# Patient Record
Sex: MALE | Race: WHITE | NOT HISPANIC OR LATINO
[De-identification: names, ages, dates, MRNs, and addresses within clinical notes are randomized per-mention and may not be internally consistent; named-entity substitution may affect disease eponyms.]

---

## 2017-03-10 ENCOUNTER — NON-APPOINTMENT (OUTPATIENT)
Age: 82
End: 2017-03-10

## 2017-03-10 ENCOUNTER — APPOINTMENT (OUTPATIENT)
Dept: CARDIOLOGY | Facility: CLINIC | Age: 82
End: 2017-03-10

## 2017-03-10 VITALS
DIASTOLIC BLOOD PRESSURE: 73 MMHG | BODY MASS INDEX: 23.03 KG/M2 | SYSTOLIC BLOOD PRESSURE: 137 MMHG | HEART RATE: 60 BPM | HEIGHT: 72 IN | WEIGHT: 170 LBS | OXYGEN SATURATION: 98 %

## 2017-05-02 ENCOUNTER — MEDICATION RENEWAL (OUTPATIENT)
Age: 82
End: 2017-05-02

## 2017-06-16 ENCOUNTER — APPOINTMENT (OUTPATIENT)
Dept: DERMATOLOGY | Facility: CLINIC | Age: 82
End: 2017-06-16

## 2017-09-27 ENCOUNTER — MEDICATION RENEWAL (OUTPATIENT)
Age: 82
End: 2017-09-27

## 2017-09-29 ENCOUNTER — APPOINTMENT (OUTPATIENT)
Dept: CARDIOLOGY | Facility: CLINIC | Age: 82
End: 2017-09-29
Payer: MEDICARE

## 2017-09-29 ENCOUNTER — NON-APPOINTMENT (OUTPATIENT)
Age: 82
End: 2017-09-29

## 2017-09-29 VITALS
OXYGEN SATURATION: 98 % | HEART RATE: 46 BPM | DIASTOLIC BLOOD PRESSURE: 65 MMHG | BODY MASS INDEX: 22.38 KG/M2 | WEIGHT: 165 LBS | SYSTOLIC BLOOD PRESSURE: 144 MMHG

## 2017-09-29 PROCEDURE — 99214 OFFICE O/P EST MOD 30 MIN: CPT

## 2017-09-29 PROCEDURE — 93000 ELECTROCARDIOGRAM COMPLETE: CPT

## 2017-10-09 ENCOUNTER — APPOINTMENT (OUTPATIENT)
Dept: CARDIOLOGY | Facility: CLINIC | Age: 82
End: 2017-10-09
Payer: MEDICARE

## 2017-10-09 PROCEDURE — 93306 TTE W/DOPPLER COMPLETE: CPT

## 2017-11-24 ENCOUNTER — MEDICATION RENEWAL (OUTPATIENT)
Age: 82
End: 2017-11-24

## 2018-02-09 ENCOUNTER — NON-APPOINTMENT (OUTPATIENT)
Age: 83
End: 2018-02-09

## 2018-02-09 ENCOUNTER — APPOINTMENT (OUTPATIENT)
Dept: CARDIOLOGY | Facility: CLINIC | Age: 83
End: 2018-02-09
Payer: MEDICARE

## 2018-02-09 VITALS
OXYGEN SATURATION: 100 % | SYSTOLIC BLOOD PRESSURE: 190 MMHG | HEIGHT: 72 IN | DIASTOLIC BLOOD PRESSURE: 76 MMHG | BODY MASS INDEX: 22.75 KG/M2 | HEART RATE: 69 BPM | WEIGHT: 168 LBS

## 2018-02-09 VITALS — SYSTOLIC BLOOD PRESSURE: 190 MMHG | DIASTOLIC BLOOD PRESSURE: 78 MMHG

## 2018-02-09 VITALS — SYSTOLIC BLOOD PRESSURE: 130 MMHG | DIASTOLIC BLOOD PRESSURE: 70 MMHG

## 2018-02-09 PROCEDURE — 93000 ELECTROCARDIOGRAM COMPLETE: CPT

## 2018-02-09 PROCEDURE — 99214 OFFICE O/P EST MOD 30 MIN: CPT

## 2018-02-09 RX ORDER — HYDROCORTISONE 25 MG/G
2.5 CREAM TOPICAL
Qty: 28 | Refills: 0 | Status: ACTIVE | COMMUNITY
Start: 2017-09-05

## 2018-02-09 RX ORDER — METOPROLOL TARTRATE 50 MG/1
50 TABLET, FILM COATED ORAL
Qty: 180 | Refills: 0 | Status: DISCONTINUED | COMMUNITY
Start: 2017-07-15

## 2018-05-11 ENCOUNTER — NON-APPOINTMENT (OUTPATIENT)
Age: 83
End: 2018-05-11

## 2018-05-11 ENCOUNTER — APPOINTMENT (OUTPATIENT)
Dept: CARDIOLOGY | Facility: CLINIC | Age: 83
End: 2018-05-11
Payer: MEDICARE

## 2018-05-11 VITALS — SYSTOLIC BLOOD PRESSURE: 160 MMHG | DIASTOLIC BLOOD PRESSURE: 70 MMHG

## 2018-05-11 VITALS
DIASTOLIC BLOOD PRESSURE: 68 MMHG | SYSTOLIC BLOOD PRESSURE: 159 MMHG | WEIGHT: 161 LBS | OXYGEN SATURATION: 99 % | HEIGHT: 72 IN | HEART RATE: 54 BPM | BODY MASS INDEX: 21.81 KG/M2

## 2018-05-11 DIAGNOSIS — R00.1 BRADYCARDIA, UNSPECIFIED: ICD-10-CM

## 2018-05-11 PROCEDURE — 99214 OFFICE O/P EST MOD 30 MIN: CPT

## 2018-05-11 PROCEDURE — 93000 ELECTROCARDIOGRAM COMPLETE: CPT

## 2018-05-14 LAB
ALBUMIN SERPL ELPH-MCNC: 4.3 G/DL
ALP BLD-CCNC: 71 U/L
ALT SERPL-CCNC: 14 U/L
ANION GAP SERPL CALC-SCNC: 14 MMOL/L
AST SERPL-CCNC: 23 U/L
BASOPHILS # BLD AUTO: 0.02 K/UL
BASOPHILS NFR BLD AUTO: 0.3 %
BILIRUB SERPL-MCNC: 0.9 MG/DL
BUN SERPL-MCNC: 22 MG/DL
CALCIUM SERPL-MCNC: 9.7 MG/DL
CHLORIDE SERPL-SCNC: 104 MMOL/L
CO2 SERPL-SCNC: 24 MMOL/L
CREAT SERPL-MCNC: 1.32 MG/DL
EOSINOPHIL # BLD AUTO: 0.12 K/UL
EOSINOPHIL NFR BLD AUTO: 1.6 %
GLUCOSE SERPL-MCNC: 178 MG/DL
HCT VFR BLD CALC: 42.5 %
HGB BLD-MCNC: 14.3 G/DL
IMM GRANULOCYTES NFR BLD AUTO: 0.1 %
LYMPHOCYTES # BLD AUTO: 2.95 K/UL
LYMPHOCYTES NFR BLD AUTO: 39.3 %
MAN DIFF?: NORMAL
MCHC RBC-ENTMCNC: 30.2 PG
MCHC RBC-ENTMCNC: 33.6 GM/DL
MCV RBC AUTO: 89.9 FL
MONOCYTES # BLD AUTO: 0.64 K/UL
MONOCYTES NFR BLD AUTO: 8.5 %
NEUTROPHILS # BLD AUTO: 3.76 K/UL
NEUTROPHILS NFR BLD AUTO: 50.2 %
PLATELET # BLD AUTO: 138 K/UL
POTASSIUM SERPL-SCNC: 4.1 MMOL/L
PROT SERPL-MCNC: 6.8 G/DL
RBC # BLD: 4.73 M/UL
RBC # FLD: 13.4 %
SODIUM SERPL-SCNC: 142 MMOL/L
TSH SERPL-ACNC: 2.91 UIU/ML
WBC # FLD AUTO: 7.5 K/UL

## 2018-05-21 VITALS
OXYGEN SATURATION: 100 % | HEART RATE: 62 BPM | SYSTOLIC BLOOD PRESSURE: 180 MMHG | DIASTOLIC BLOOD PRESSURE: 70 MMHG | RESPIRATION RATE: 18 BRPM

## 2018-05-31 ENCOUNTER — APPOINTMENT (OUTPATIENT)
Dept: CARDIOLOGY | Facility: CLINIC | Age: 83
End: 2018-05-31
Payer: MEDICARE

## 2018-05-31 ENCOUNTER — NON-APPOINTMENT (OUTPATIENT)
Age: 83
End: 2018-05-31

## 2018-05-31 VITALS
WEIGHT: 166 LBS | BODY MASS INDEX: 22.48 KG/M2 | HEART RATE: 101 BPM | SYSTOLIC BLOOD PRESSURE: 112 MMHG | OXYGEN SATURATION: 99 % | DIASTOLIC BLOOD PRESSURE: 65 MMHG | HEIGHT: 72 IN

## 2018-05-31 PROCEDURE — 99214 OFFICE O/P EST MOD 30 MIN: CPT

## 2018-05-31 PROCEDURE — 93000 ELECTROCARDIOGRAM COMPLETE: CPT

## 2018-06-15 ENCOUNTER — NON-APPOINTMENT (OUTPATIENT)
Age: 83
End: 2018-06-15

## 2018-06-15 ENCOUNTER — APPOINTMENT (OUTPATIENT)
Dept: CARDIOLOGY | Facility: CLINIC | Age: 83
End: 2018-06-15
Payer: MEDICARE

## 2018-06-15 VITALS — DIASTOLIC BLOOD PRESSURE: 64 MMHG | SYSTOLIC BLOOD PRESSURE: 140 MMHG

## 2018-06-15 VITALS
HEART RATE: 62 BPM | DIASTOLIC BLOOD PRESSURE: 63 MMHG | HEIGHT: 72 IN | SYSTOLIC BLOOD PRESSURE: 164 MMHG | OXYGEN SATURATION: 98 %

## 2018-06-15 DIAGNOSIS — R60.0 LOCALIZED EDEMA: ICD-10-CM

## 2018-06-15 DIAGNOSIS — E78.5 HYPERLIPIDEMIA, UNSPECIFIED: ICD-10-CM

## 2018-06-15 DIAGNOSIS — R73.09 OTHER ABNORMAL GLUCOSE: ICD-10-CM

## 2018-06-15 DIAGNOSIS — I25.10 ATHEROSCLEROTIC HEART DISEASE OF NATIVE CORONARY ARTERY W/OUT ANGINA PECTORIS: ICD-10-CM

## 2018-06-15 PROCEDURE — 93000 ELECTROCARDIOGRAM COMPLETE: CPT

## 2018-06-15 PROCEDURE — 99214 OFFICE O/P EST MOD 30 MIN: CPT

## 2018-06-21 ENCOUNTER — RESULT REVIEW (OUTPATIENT)
Age: 83
End: 2018-06-21

## 2018-06-26 VITALS
TEMPERATURE: 98 F | SYSTOLIC BLOOD PRESSURE: 152 MMHG | DIASTOLIC BLOOD PRESSURE: 64 MMHG | RESPIRATION RATE: 18 BRPM | OXYGEN SATURATION: 96 % | HEART RATE: 60 BPM

## 2018-07-27 ENCOUNTER — RX RENEWAL (OUTPATIENT)
Age: 83
End: 2018-07-27

## 2018-07-27 ENCOUNTER — MEDICATION RENEWAL (OUTPATIENT)
Age: 83
End: 2018-07-27

## 2018-07-27 VITALS — HEART RATE: 53 BPM | SYSTOLIC BLOOD PRESSURE: 158 MMHG | DIASTOLIC BLOOD PRESSURE: 68 MMHG

## 2018-09-03 PROBLEM — R60.0 BILATERAL EDEMA OF LOWER EXTREMITY: Status: ACTIVE | Noted: 2018-06-15

## 2018-09-28 VITALS — DIASTOLIC BLOOD PRESSURE: 68 MMHG | SYSTOLIC BLOOD PRESSURE: 159 MMHG

## 2018-09-28 VITALS — SYSTOLIC BLOOD PRESSURE: 162 MMHG | OXYGEN SATURATION: 99 % | HEART RATE: 55 BPM | DIASTOLIC BLOOD PRESSURE: 64 MMHG

## 2018-10-14 ENCOUNTER — RX RENEWAL (OUTPATIENT)
Age: 83
End: 2018-10-14

## 2018-11-16 VITALS — DIASTOLIC BLOOD PRESSURE: 60 MMHG | SYSTOLIC BLOOD PRESSURE: 140 MMHG

## 2018-11-16 VITALS
SYSTOLIC BLOOD PRESSURE: 152 MMHG | HEART RATE: 72 BPM | BODY MASS INDEX: 21.94 KG/M2 | WEIGHT: 162 LBS | RESPIRATION RATE: 16 BRPM | HEIGHT: 72 IN | OXYGEN SATURATION: 98 % | DIASTOLIC BLOOD PRESSURE: 64 MMHG

## 2019-02-06 ENCOUNTER — RX RENEWAL (OUTPATIENT)
Age: 84
End: 2019-02-06

## 2019-03-15 ENCOUNTER — APPOINTMENT (OUTPATIENT)
Dept: CARDIOLOGY | Facility: CLINIC | Age: 84
End: 2019-03-15
Payer: MEDICARE

## 2019-03-15 ENCOUNTER — NON-APPOINTMENT (OUTPATIENT)
Age: 84
End: 2019-03-15

## 2019-03-15 VITALS
OXYGEN SATURATION: 100 % | WEIGHT: 166 LBS | HEART RATE: 64 BPM | HEIGHT: 72 IN | BODY MASS INDEX: 22.48 KG/M2 | DIASTOLIC BLOOD PRESSURE: 62 MMHG | SYSTOLIC BLOOD PRESSURE: 162 MMHG

## 2019-03-15 PROCEDURE — 93000 ELECTROCARDIOGRAM COMPLETE: CPT

## 2019-03-15 PROCEDURE — 99214 OFFICE O/P EST MOD 30 MIN: CPT

## 2019-03-15 NOTE — REVIEW OF SYSTEMS
[Eyeglasses] : currently wearing eyeglasses [Loss Of Hearing] : hearing loss [see HPI] : see HPI [Lower Ext Edema] : lower extremity edema [Negative] : Heme/Lymph

## 2019-04-06 NOTE — PHYSICAL EXAM
[General Appearance - Well Developed] : well developed [Normal Appearance] : normal appearance [Well Groomed] : well groomed [General Appearance - Well Nourished] : well nourished [General Appearance - In No Acute Distress] : no acute distress [Normal Conjunctiva] : the conjunctiva exhibited no abnormalities [No Oral Pallor] : no oral pallor [No Oral Cyanosis] : no oral cyanosis [Normal Jugular Venous V Waves Present] : normal jugular venous V waves present [No Jugular Venous Cabezas A Waves] : no jugular venous cabezas A waves [Respiration, Rhythm And Depth] : normal respiratory rhythm and effort [Exaggerated Use Of Accessory Muscles For Inspiration] : no accessory muscle use [Auscultation Breath Sounds / Voice Sounds] : lungs were clear to auscultation bilaterally [Heart Rate And Rhythm] : heart rate and rhythm were normal [Heart Sounds] : normal S1 and S2 [Arterial Pulses Normal] : the arterial pulses were normal [Diastolic Grade ___/4] : A grade [unfilled]/4 diastolic murmur was heard. [Abdomen Soft] : soft [Abdomen Tenderness] : non-tender [Abnormal Walk] : normal gait [Nail Clubbing] : no clubbing of the fingernails [Cyanosis, Localized] : no localized cyanosis [Skin Turgor] : normal skin turgor [] : no rash [Oriented To Time, Place, And Person] : oriented to person, place, and time [Impaired Insight] : insight and judgment were intact [Affect] : the affect was normal [Memory Recent] : recent memory was not impaired [FreeTextEntry1] : 2+ bilateral ankle edema

## 2019-04-06 NOTE — HISTORY OF PRESENT ILLNESS
[FreeTextEntry1] : 83 year old man with a history of hypertension, hyperlipidemia, paroxysmal atrial fibrillation - on Eliquis, dilated aortic root - stable (4.0 cm) on 10/20/17 TTE and back pain who is here for f/u. Reports of having low BP readings since an increase of Amlodipine from 5 mg to 10 mg QD. He has been feeling tired and under stress taking care of his wife, who needs 100% care and was diagnosed with leukemia.  She is now on hospice.  He.denies chest pain, shortness of breath, palpitations, syncope or near syncope, orthopnea and PND. On 5 mg of Norvasc, the edema is improved but still wears compression stockings..  He denies chest pain, dyspnea, palpitations or near syncope but notes lightheadedness with position changes.

## 2019-04-24 ENCOUNTER — RX RENEWAL (OUTPATIENT)
Age: 84
End: 2019-04-24

## 2019-05-15 ENCOUNTER — RX RENEWAL (OUTPATIENT)
Age: 84
End: 2019-05-15

## 2019-07-09 ENCOUNTER — RX RENEWAL (OUTPATIENT)
Age: 84
End: 2019-07-09

## 2019-07-10 RX ORDER — METOPROLOL TARTRATE 50 MG/1
50 TABLET, FILM COATED ORAL TWICE DAILY
Qty: 180 | Refills: 3 | Status: DISCONTINUED | COMMUNITY
Start: 2019-05-15 | End: 2019-07-10

## 2019-07-31 ENCOUNTER — RX RENEWAL (OUTPATIENT)
Age: 84
End: 2019-07-31

## 2019-08-29 ENCOUNTER — APPOINTMENT (OUTPATIENT)
Dept: CARDIOLOGY | Facility: CLINIC | Age: 84
End: 2019-08-29
Payer: MEDICARE

## 2019-08-29 VITALS
DIASTOLIC BLOOD PRESSURE: 70 MMHG | OXYGEN SATURATION: 97 % | HEART RATE: 100 BPM | RESPIRATION RATE: 16 BRPM | SYSTOLIC BLOOD PRESSURE: 130 MMHG

## 2019-08-29 PROCEDURE — 93306 TTE W/DOPPLER COMPLETE: CPT

## 2019-08-30 ENCOUNTER — NON-APPOINTMENT (OUTPATIENT)
Age: 84
End: 2019-08-30

## 2019-08-30 ENCOUNTER — APPOINTMENT (OUTPATIENT)
Dept: ELECTROPHYSIOLOGY | Facility: CLINIC | Age: 84
End: 2019-08-30
Payer: MEDICARE

## 2019-08-30 VITALS
BODY MASS INDEX: 22.21 KG/M2 | SYSTOLIC BLOOD PRESSURE: 145 MMHG | DIASTOLIC BLOOD PRESSURE: 64 MMHG | HEIGHT: 72 IN | OXYGEN SATURATION: 97 % | WEIGHT: 164 LBS | HEART RATE: 52 BPM

## 2019-08-30 DIAGNOSIS — R53.83 OTHER FATIGUE: ICD-10-CM

## 2019-08-30 DIAGNOSIS — R00.2 PALPITATIONS: ICD-10-CM

## 2019-08-30 PROCEDURE — 93000 ELECTROCARDIOGRAM COMPLETE: CPT

## 2019-08-30 PROCEDURE — 99214 OFFICE O/P EST MOD 30 MIN: CPT | Mod: 25

## 2019-09-05 ENCOUNTER — NON-APPOINTMENT (OUTPATIENT)
Age: 84
End: 2019-09-05

## 2019-09-05 NOTE — PHYSICAL EXAM
[General Appearance - Well Developed] : well developed [Normal Appearance] : normal appearance [General Appearance - Well Nourished] : well nourished [Normal Conjunctiva] : the conjunctiva exhibited no abnormalities [Normal Jugular Venous V Waves Present] : normal jugular venous V waves present [Respiration, Rhythm And Depth] : normal respiratory rhythm and effort [Exaggerated Use Of Accessory Muscles For Inspiration] : no accessory muscle use [Auscultation Breath Sounds / Voice Sounds] : lungs were clear to auscultation bilaterally [Heart Rate And Rhythm] : heart rate and rhythm were normal [Heart Sounds] : normal S1 and S2 [Murmurs] : no murmurs present [Edema] : no peripheral edema present [Bowel Sounds] : normal bowel sounds [Abdomen Soft] : soft [Abdomen Tenderness] : non-tender [Abnormal Walk] : normal gait [Nail Clubbing] : no clubbing of the fingernails [Skin Color & Pigmentation] : normal skin color and pigmentation [] : no rash [Oriented To Time, Place, And Person] : oriented to person, place, and time [No Anxiety] : not feeling anxious

## 2019-09-16 ENCOUNTER — APPOINTMENT (OUTPATIENT)
Dept: CARDIOLOGY | Facility: CLINIC | Age: 84
End: 2019-09-16
Payer: MEDICARE

## 2019-09-16 PROCEDURE — 78452 HT MUSCLE IMAGE SPECT MULT: CPT

## 2019-09-16 PROCEDURE — A9500: CPT

## 2019-09-16 PROCEDURE — 93015 CV STRESS TEST SUPVJ I&R: CPT

## 2019-09-23 NOTE — HISTORY OF PRESENT ILLNESS
[FreeTextEntry1] : 83 year old man with a history of hypertension, hyperlipidemia, dilated aortic root - stable (4.0 cm) on 10/20/17 TTE and paroxysmal atrial fibrillation, on Eliquis, recently maintaining SR. \par Presented yesterday with complaints of increased fatigue since that morning found to be in AF with RVR @ 121bpm.  Denied CP, SOB, MORRIS, dizziness syncope or near syncope\par \par Underwent TTE which showed LVEF 55-60%, moderate AI and stable dilated aortic root. Metoprolol was increased to 50mg BID with plans to f/up today.  Reports resolution of symptoms last night and current asymptomatic.

## 2019-09-23 NOTE — REVIEW OF SYSTEMS
[Feeling Fatigued] : feeling fatigued [Dyspnea on exertion] : dyspnea during exertion [Palpitations] : palpitations [Negative] : Heme/Lymph [Fever] : no fever [Chills] : no chills [Shortness Of Breath] : no shortness of breath [Chest  Pressure] : no chest pressure [Chest Pain] : no chest pain [Lower Ext Edema] : no extremity edema [Cough] : no cough [Confusion] : no confusion was observed [Anxiety] : no anxiety [Easy Bleeding] : no tendency for easy bleeding [Easy Bruising] : no tendency for easy bruising

## 2019-09-26 ENCOUNTER — RX RENEWAL (OUTPATIENT)
Age: 84
End: 2019-09-26

## 2019-11-01 ENCOUNTER — APPOINTMENT (OUTPATIENT)
Dept: CARDIOLOGY | Facility: CLINIC | Age: 84
End: 2019-11-01
Payer: MEDICARE

## 2019-11-01 ENCOUNTER — NON-APPOINTMENT (OUTPATIENT)
Age: 84
End: 2019-11-01

## 2019-11-01 ENCOUNTER — APPOINTMENT (OUTPATIENT)
Dept: ELECTROPHYSIOLOGY | Facility: CLINIC | Age: 84
End: 2019-11-01

## 2019-11-01 VITALS
OXYGEN SATURATION: 97 % | HEIGHT: 72 IN | WEIGHT: 165 LBS | SYSTOLIC BLOOD PRESSURE: 140 MMHG | HEART RATE: 60 BPM | BODY MASS INDEX: 22.35 KG/M2 | RESPIRATION RATE: 16 BRPM | DIASTOLIC BLOOD PRESSURE: 62 MMHG

## 2019-11-01 DIAGNOSIS — I35.1 NONRHEUMATIC AORTIC (VALVE) INSUFFICIENCY: ICD-10-CM

## 2019-11-01 DIAGNOSIS — I71.2 THORACIC AORTIC ANEURYSM, W/OUT RUPTURE: ICD-10-CM

## 2019-11-01 DIAGNOSIS — I48.0 PAROXYSMAL ATRIAL FIBRILLATION: ICD-10-CM

## 2019-11-01 DIAGNOSIS — I10 ESSENTIAL (PRIMARY) HYPERTENSION: ICD-10-CM

## 2019-11-01 PROCEDURE — 99214 OFFICE O/P EST MOD 30 MIN: CPT

## 2019-11-01 PROCEDURE — 93000 ELECTROCARDIOGRAM COMPLETE: CPT

## 2019-11-27 ENCOUNTER — RX RENEWAL (OUTPATIENT)
Age: 84
End: 2019-11-27

## 2019-11-29 ENCOUNTER — MEDICATION RENEWAL (OUTPATIENT)
Age: 84
End: 2019-11-29

## 2020-02-10 ENCOUNTER — RX RENEWAL (OUTPATIENT)
Age: 85
End: 2020-02-10

## 2020-04-02 ENCOUNTER — RX RENEWAL (OUTPATIENT)
Age: 85
End: 2020-04-02

## 2020-05-18 ENCOUNTER — RX RENEWAL (OUTPATIENT)
Age: 85
End: 2020-05-18

## 2020-08-21 ENCOUNTER — APPOINTMENT (OUTPATIENT)
Dept: CARDIOLOGY | Facility: CLINIC | Age: 85
End: 2020-08-21

## 2020-10-22 RX ORDER — METOPROLOL TARTRATE 25 MG/1
25 TABLET, FILM COATED ORAL
Qty: 180 | Refills: 1 | Status: ACTIVE | COMMUNITY
Start: 1900-01-01 | End: 1900-01-01

## 2023-02-17 LAB
ANION GAP SERPL CALC-SCNC: 14 MMOL/L
BUN SERPL-MCNC: 28 MG/DL
CALCIUM SERPL-MCNC: 9.6 MG/DL
CHLORIDE SERPL-SCNC: 104 MMOL/L
CO2 SERPL-SCNC: 25 MMOL/L
CREAT SERPL-MCNC: 1.23 MG/DL
GLUCOSE SERPL-MCNC: 97 MG/DL
HBA1C MFR BLD HPLC: 6 %
POTASSIUM SERPL-SCNC: 4.8 MMOL/L
SODIUM SERPL-SCNC: 143 MMOL/L

## 2023-12-02 ENCOUNTER — EMERGENCY (EMERGENCY)
Facility: HOSPITAL | Age: 88
LOS: 1 days | Discharge: HOME | End: 2023-12-02
Attending: EMERGENCY MEDICINE
Payer: MEDICARE

## 2023-12-02 VITALS
HEART RATE: 100 BPM | TEMPERATURE: 98 F | DIASTOLIC BLOOD PRESSURE: 91 MMHG | RESPIRATION RATE: 20 BRPM | OXYGEN SATURATION: 98 % | SYSTOLIC BLOOD PRESSURE: 179 MMHG

## 2023-12-02 VITALS
SYSTOLIC BLOOD PRESSURE: 158 MMHG | OXYGEN SATURATION: 97 % | DIASTOLIC BLOOD PRESSURE: 82 MMHG | RESPIRATION RATE: 17 BRPM | TEMPERATURE: 98 F | HEART RATE: 83 BPM

## 2023-12-02 DIAGNOSIS — F43.21 ADJUSTMENT DISORDER WITH DEPRESSED MOOD: ICD-10-CM

## 2023-12-02 LAB
ALBUMIN SERPL ELPH-MCNC: 4.1 G/DL — SIGNIFICANT CHANGE UP (ref 3.3–5.2)
ALBUMIN SERPL ELPH-MCNC: 4.1 G/DL — SIGNIFICANT CHANGE UP (ref 3.3–5.2)
ALP SERPL-CCNC: 78 U/L — SIGNIFICANT CHANGE UP (ref 40–120)
ALP SERPL-CCNC: 78 U/L — SIGNIFICANT CHANGE UP (ref 40–120)
ALT FLD-CCNC: 13 U/L — SIGNIFICANT CHANGE UP
ALT FLD-CCNC: 13 U/L — SIGNIFICANT CHANGE UP
AMPHET UR-MCNC: NEGATIVE — SIGNIFICANT CHANGE UP
AMPHET UR-MCNC: NEGATIVE — SIGNIFICANT CHANGE UP
ANION GAP SERPL CALC-SCNC: 14 MMOL/L — SIGNIFICANT CHANGE UP (ref 5–17)
ANION GAP SERPL CALC-SCNC: 14 MMOL/L — SIGNIFICANT CHANGE UP (ref 5–17)
APAP SERPL-MCNC: <3 UG/ML — LOW (ref 10–26)
APAP SERPL-MCNC: <3 UG/ML — LOW (ref 10–26)
AST SERPL-CCNC: 29 U/L — SIGNIFICANT CHANGE UP
AST SERPL-CCNC: 29 U/L — SIGNIFICANT CHANGE UP
BARBITURATES UR SCN-MCNC: NEGATIVE — SIGNIFICANT CHANGE UP
BARBITURATES UR SCN-MCNC: NEGATIVE — SIGNIFICANT CHANGE UP
BASOPHILS # BLD AUTO: 0.05 K/UL — SIGNIFICANT CHANGE UP (ref 0–0.2)
BASOPHILS # BLD AUTO: 0.05 K/UL — SIGNIFICANT CHANGE UP (ref 0–0.2)
BASOPHILS NFR BLD AUTO: 0.6 % — SIGNIFICANT CHANGE UP (ref 0–2)
BASOPHILS NFR BLD AUTO: 0.6 % — SIGNIFICANT CHANGE UP (ref 0–2)
BENZODIAZ UR-MCNC: NEGATIVE — SIGNIFICANT CHANGE UP
BENZODIAZ UR-MCNC: NEGATIVE — SIGNIFICANT CHANGE UP
BILIRUB SERPL-MCNC: 0.6 MG/DL — SIGNIFICANT CHANGE UP (ref 0.4–2)
BILIRUB SERPL-MCNC: 0.6 MG/DL — SIGNIFICANT CHANGE UP (ref 0.4–2)
BUN SERPL-MCNC: 27.4 MG/DL — HIGH (ref 8–20)
BUN SERPL-MCNC: 27.4 MG/DL — HIGH (ref 8–20)
CALCIUM SERPL-MCNC: 9.7 MG/DL — SIGNIFICANT CHANGE UP (ref 8.4–10.5)
CALCIUM SERPL-MCNC: 9.7 MG/DL — SIGNIFICANT CHANGE UP (ref 8.4–10.5)
CHLORIDE SERPL-SCNC: 103 MMOL/L — SIGNIFICANT CHANGE UP (ref 96–108)
CHLORIDE SERPL-SCNC: 103 MMOL/L — SIGNIFICANT CHANGE UP (ref 96–108)
CO2 SERPL-SCNC: 23 MMOL/L — SIGNIFICANT CHANGE UP (ref 22–29)
CO2 SERPL-SCNC: 23 MMOL/L — SIGNIFICANT CHANGE UP (ref 22–29)
COCAINE METAB.OTHER UR-MCNC: NEGATIVE — SIGNIFICANT CHANGE UP
COCAINE METAB.OTHER UR-MCNC: NEGATIVE — SIGNIFICANT CHANGE UP
CREAT SERPL-MCNC: 1.55 MG/DL — HIGH (ref 0.5–1.3)
CREAT SERPL-MCNC: 1.55 MG/DL — HIGH (ref 0.5–1.3)
EGFR: 43 ML/MIN/1.73M2 — LOW
EGFR: 43 ML/MIN/1.73M2 — LOW
EOSINOPHIL # BLD AUTO: 0.22 K/UL — SIGNIFICANT CHANGE UP (ref 0–0.5)
EOSINOPHIL # BLD AUTO: 0.22 K/UL — SIGNIFICANT CHANGE UP (ref 0–0.5)
EOSINOPHIL NFR BLD AUTO: 2.6 % — SIGNIFICANT CHANGE UP (ref 0–6)
EOSINOPHIL NFR BLD AUTO: 2.6 % — SIGNIFICANT CHANGE UP (ref 0–6)
ETHANOL SERPL-MCNC: <10 MG/DL — SIGNIFICANT CHANGE UP (ref 0–9)
ETHANOL SERPL-MCNC: <10 MG/DL — SIGNIFICANT CHANGE UP (ref 0–9)
GLUCOSE SERPL-MCNC: 78 MG/DL — SIGNIFICANT CHANGE UP (ref 70–99)
GLUCOSE SERPL-MCNC: 78 MG/DL — SIGNIFICANT CHANGE UP (ref 70–99)
HCT VFR BLD CALC: 45.3 % — SIGNIFICANT CHANGE UP (ref 39–50)
HCT VFR BLD CALC: 45.3 % — SIGNIFICANT CHANGE UP (ref 39–50)
HGB BLD-MCNC: 15.4 G/DL — SIGNIFICANT CHANGE UP (ref 13–17)
HGB BLD-MCNC: 15.4 G/DL — SIGNIFICANT CHANGE UP (ref 13–17)
IMM GRANULOCYTES NFR BLD AUTO: 0.3 % — SIGNIFICANT CHANGE UP (ref 0–0.9)
IMM GRANULOCYTES NFR BLD AUTO: 0.3 % — SIGNIFICANT CHANGE UP (ref 0–0.9)
LYMPHOCYTES # BLD AUTO: 3.3 K/UL — SIGNIFICANT CHANGE UP (ref 1–3.3)
LYMPHOCYTES # BLD AUTO: 3.3 K/UL — SIGNIFICANT CHANGE UP (ref 1–3.3)
LYMPHOCYTES # BLD AUTO: 38.4 % — SIGNIFICANT CHANGE UP (ref 13–44)
LYMPHOCYTES # BLD AUTO: 38.4 % — SIGNIFICANT CHANGE UP (ref 13–44)
MCHC RBC-ENTMCNC: 30.3 PG — SIGNIFICANT CHANGE UP (ref 27–34)
MCHC RBC-ENTMCNC: 30.3 PG — SIGNIFICANT CHANGE UP (ref 27–34)
MCHC RBC-ENTMCNC: 34 GM/DL — SIGNIFICANT CHANGE UP (ref 32–36)
MCHC RBC-ENTMCNC: 34 GM/DL — SIGNIFICANT CHANGE UP (ref 32–36)
MCV RBC AUTO: 89 FL — SIGNIFICANT CHANGE UP (ref 80–100)
MCV RBC AUTO: 89 FL — SIGNIFICANT CHANGE UP (ref 80–100)
METHADONE UR-MCNC: NEGATIVE — SIGNIFICANT CHANGE UP
METHADONE UR-MCNC: NEGATIVE — SIGNIFICANT CHANGE UP
MONOCYTES # BLD AUTO: 1.02 K/UL — HIGH (ref 0–0.9)
MONOCYTES # BLD AUTO: 1.02 K/UL — HIGH (ref 0–0.9)
MONOCYTES NFR BLD AUTO: 11.9 % — SIGNIFICANT CHANGE UP (ref 2–14)
MONOCYTES NFR BLD AUTO: 11.9 % — SIGNIFICANT CHANGE UP (ref 2–14)
NEUTROPHILS # BLD AUTO: 3.98 K/UL — SIGNIFICANT CHANGE UP (ref 1.8–7.4)
NEUTROPHILS # BLD AUTO: 3.98 K/UL — SIGNIFICANT CHANGE UP (ref 1.8–7.4)
NEUTROPHILS NFR BLD AUTO: 46.2 % — SIGNIFICANT CHANGE UP (ref 43–77)
NEUTROPHILS NFR BLD AUTO: 46.2 % — SIGNIFICANT CHANGE UP (ref 43–77)
OPIATES UR-MCNC: NEGATIVE — SIGNIFICANT CHANGE UP
OPIATES UR-MCNC: NEGATIVE — SIGNIFICANT CHANGE UP
PCP SPEC-MCNC: SIGNIFICANT CHANGE UP
PCP SPEC-MCNC: SIGNIFICANT CHANGE UP
PCP UR-MCNC: NEGATIVE — SIGNIFICANT CHANGE UP
PCP UR-MCNC: NEGATIVE — SIGNIFICANT CHANGE UP
PLATELET # BLD AUTO: 153 K/UL — SIGNIFICANT CHANGE UP (ref 150–400)
PLATELET # BLD AUTO: 153 K/UL — SIGNIFICANT CHANGE UP (ref 150–400)
POTASSIUM SERPL-MCNC: 5.1 MMOL/L — SIGNIFICANT CHANGE UP (ref 3.5–5.3)
POTASSIUM SERPL-MCNC: 5.1 MMOL/L — SIGNIFICANT CHANGE UP (ref 3.5–5.3)
POTASSIUM SERPL-SCNC: 5.1 MMOL/L — SIGNIFICANT CHANGE UP (ref 3.5–5.3)
POTASSIUM SERPL-SCNC: 5.1 MMOL/L — SIGNIFICANT CHANGE UP (ref 3.5–5.3)
PROT SERPL-MCNC: 7.3 G/DL — SIGNIFICANT CHANGE UP (ref 6.6–8.7)
PROT SERPL-MCNC: 7.3 G/DL — SIGNIFICANT CHANGE UP (ref 6.6–8.7)
RBC # BLD: 5.09 M/UL — SIGNIFICANT CHANGE UP (ref 4.2–5.8)
RBC # BLD: 5.09 M/UL — SIGNIFICANT CHANGE UP (ref 4.2–5.8)
RBC # FLD: 12.8 % — SIGNIFICANT CHANGE UP (ref 10.3–14.5)
RBC # FLD: 12.8 % — SIGNIFICANT CHANGE UP (ref 10.3–14.5)
SALICYLATES SERPL-MCNC: <0.6 MG/DL — LOW (ref 10–20)
SALICYLATES SERPL-MCNC: <0.6 MG/DL — LOW (ref 10–20)
SARS-COV-2 RNA SPEC QL NAA+PROBE: SIGNIFICANT CHANGE UP
SARS-COV-2 RNA SPEC QL NAA+PROBE: SIGNIFICANT CHANGE UP
SODIUM SERPL-SCNC: 140 MMOL/L — SIGNIFICANT CHANGE UP (ref 135–145)
SODIUM SERPL-SCNC: 140 MMOL/L — SIGNIFICANT CHANGE UP (ref 135–145)
THC UR QL: NEGATIVE — SIGNIFICANT CHANGE UP
THC UR QL: NEGATIVE — SIGNIFICANT CHANGE UP
WBC # BLD: 8.6 K/UL — SIGNIFICANT CHANGE UP (ref 3.8–10.5)
WBC # BLD: 8.6 K/UL — SIGNIFICANT CHANGE UP (ref 3.8–10.5)
WBC # FLD AUTO: 8.6 K/UL — SIGNIFICANT CHANGE UP (ref 3.8–10.5)
WBC # FLD AUTO: 8.6 K/UL — SIGNIFICANT CHANGE UP (ref 3.8–10.5)

## 2023-12-02 PROCEDURE — 93010 ELECTROCARDIOGRAM REPORT: CPT

## 2023-12-02 PROCEDURE — 99285 EMERGENCY DEPT VISIT HI MDM: CPT | Mod: 25

## 2023-12-02 PROCEDURE — 36415 COLL VENOUS BLD VENIPUNCTURE: CPT

## 2023-12-02 PROCEDURE — 80307 DRUG TEST PRSMV CHEM ANLYZR: CPT

## 2023-12-02 PROCEDURE — 85025 COMPLETE CBC W/AUTO DIFF WBC: CPT

## 2023-12-02 PROCEDURE — 80053 COMPREHEN METABOLIC PANEL: CPT

## 2023-12-02 PROCEDURE — 90792 PSYCH DIAG EVAL W/MED SRVCS: CPT

## 2023-12-02 PROCEDURE — 93005 ELECTROCARDIOGRAM TRACING: CPT

## 2023-12-02 PROCEDURE — 99285 EMERGENCY DEPT VISIT HI MDM: CPT

## 2023-12-02 PROCEDURE — 87635 SARS-COV-2 COVID-19 AMP PRB: CPT

## 2023-12-02 RX ORDER — AMLODIPINE BESYLATE 2.5 MG/1
5 TABLET ORAL ONCE
Refills: 0 | Status: COMPLETED | OUTPATIENT
Start: 2023-12-02 | End: 2023-12-02

## 2023-12-02 RX ORDER — METOPROLOL TARTRATE 50 MG
50 TABLET ORAL ONCE
Refills: 0 | Status: COMPLETED | OUTPATIENT
Start: 2023-12-02 | End: 2023-12-02

## 2023-12-02 RX ADMIN — AMLODIPINE BESYLATE 5 MILLIGRAM(S): 2.5 TABLET ORAL at 19:12

## 2023-12-02 RX ADMIN — Medication 50 MILLIGRAM(S): at 19:12

## 2023-12-02 NOTE — ED ADULT TRIAGE NOTE - CHIEF COMPLAINT QUOTE
Spoke with Gerhard regarding results/recommendations. she agreed, denied questions.    Patient agreed to follow up in a week.     Patient stopped PT for 2 weeks and still having issues with legs. Physical Therapist advised patient to consult with  if patient needs to continue due to the time allowed through medicare.   Patient would like to continue but didn't know if she needs an US to see why she is having pain.  Please advise   [facility] - a&ox4; per pt admits to being unhappy at assisted living and making "suicidal statements to a councelor" @ his facility and sent for psych eval. pt states he has "no plans to commit suicide or the drive but I feel like my daughter would be better off". MD at bedside for eval. .

## 2023-12-02 NOTE — ED BEHAVIORAL HEALTH ASSESSMENT NOTE - NURSING HOME
Physical Therapy  Cash Atria

## 2023-12-02 NOTE — ED BEHAVIORAL HEALTH ASSESSMENT NOTE - RISK ASSESSMENT
RF: Adjustment to new living situation  PF: Several coping skills, no PPHx, supportive family, no substance use, no aggression or violence, alert & oriented x 4

## 2023-12-02 NOTE — ED PROVIDER NOTE - OBJECTIVE STATEMENT
89-year-old male; PMH significant for A-fib (on Eliquis (, HTN, HLD; now presenting with suicidal ideation.  Patient states that he was having an evaluation with counselor at the [facility] and stated that he might be better off dead so that his daughter could more easily take his apartment from him.  Patient states he used to live in [facility] and was recently moved to the [facility] in February.  Patient states that his daughter now lives in his apartment.  Patient states that it would be easier for everyone if he was dead.  Patient states he does not have encouraged to end his life.  States he has no plan.  And he was just dating this and passing to the counselor out of frustration.  Denies HI/AH/VH.  Denies fever, chills, sweats.  Denies any abdominal pain.  Denies chest pain, shortness of breath, palpitations. 89-year-old male; PMH significant for A-fib (on Eliquis (, HTN, HLD; now presenting with suicidal ideation.  Patient states that he was having an evaluation with counselor at the [facility] and stated that he might be better off dead so that his daughter could more easily take his apartment from him.  Patient states he used to live in [facility] and was recently moved to the [facility] in February.  Patient states that his daughter now lives in his apartment.  Patient states that it would be easier for everyone if he was dead.  Patient states he does not have courage to end his life and would never do that. 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  States he has no plan to end life.  And he was just dating this and passing to the counselor out of frustration.  Denies HI/AH/VH.  Denies fever, chills, sweats.  Denies any abdominal pain.  Denies chest pain, shortness of breath, palpitations.

## 2023-12-02 NOTE — ED ADULT NURSE NOTE - NSFALLUNIVINTERV_ED_ALL_ED
Bed/Stretcher in lowest position, wheels locked, appropriate side rails in place/Call bell, personal items and telephone in reach/Instruct patient to call for assistance before getting out of bed/chair/stretcher/Non-slip footwear applied when patient is off stretcher/[place] to call system/Physically safe environment - no spills, clutter or unnecessary equipment/Purposeful proactive rounding/Room/bathroom lighting operational, light cord in reach

## 2023-12-02 NOTE — ED BEHAVIORAL HEALTH ASSESSMENT NOTE - DESCRIPTION
No acute events.     ICU Vital Signs Last 24 Hrs  T(C): 36.4 (02 Dec 2023 15:07), Max: 36.8 (02 Dec 2023 12:13)  T(F): 97.6 (02 Dec 2023 15:07), Max: 98.2 (02 Dec 2023 12:13)  HR: 84 (02 Dec 2023 15:07) (83 - 84)  BP: 162/83 (02 Dec 2023 15:07) (158/82 - 162/83)  BP(mean): 109 (02 Dec 2023 15:07) (109 - 109)  ABP: --  ABP(mean): --  RR: 18 (02 Dec 2023 15:07) (17 - 18)  SpO2: 98% (02 Dec 2023 15:07) (97% - 98%)    O2 Parameters below as of 02 Dec 2023 15:07  Patient On (Oxygen Delivery Method): room air Domiciled at [facility]; retired from [place] [employer]; ; 1 adult daughter, Amanda, living in [place] Domiciled at [facility]; retired from [place] [employer]; ; 1 adult daughter, Amanda, living in [place] Domiciled at [facility]; retired from [place] [employer]; ; 1 adult daughter, Amanda, living in [place] A Fib

## 2023-12-02 NOTE — ED BEHAVIORAL HEALTH ASSESSMENT NOTE - SUMMARY
89 year old, male; domiciled with fellow residents at [facility]; ; noncaregiver; retired teacher; no past psychiatric history; no psychiatric  hospitalizations; no known suicide attempts; no known history of violence or arrests; no active substance abuse or known history of complicated withdrawal; PMH of A Fib; brought in by EMS; called by [facility] in the setting of reportedly endorsing SI to staff.    Upon assessment, pt presents well related, polite, pleasant, alert and oriented to time, place, person and situation. Pt explains that he "may have said something he didn't mean out of frustration living in a nursing home." Pt denies any SI/HI/AH/VH/paranoia and does not warrant further observation or psychiatric treatment. Pt is psychiatrically cleared for discharge. Case discussed with Dr. Lantigua who is in agreement with assessment and plan. 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    Upon assessment, pt presents well related, polite, pleasant, alert and oriented to time, place, person and situation. Pt explains that he "may have said something he didn't mean out of frustration living in a nursing home." Pt denies any SI/HI/AH/VH/paranoia and does not warrant further observation or psychiatric treatment. Pt is psychiatrically cleared for discharge. Case discussed with Dr. Lantigua who is in agreement with assessment and plan. 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    Upon assessment, pt presents well related, polite, pleasant, alert and oriented to time, place, person and situation. Pt explains that he "may have said something he didn't mean out of frustration living in a nursing home." Pt denies any SI/HI/AH/VH/paranoia and does not warrant further observation or psychiatric treatment. Pt is psychiatrically cleared for discharge. Case discussed with Dr. Lantigua who is in agreement with assessment and plan.

## 2023-12-02 NOTE — ED BEHAVIORAL HEALTH ASSESSMENT NOTE - DEPENDENTS
Past Medical History





- Primary Care Physician


PCP:: Julia Meraz





- Admission


Chief Complaint: 21 yrd  , lmp 18 edcedc by dates 20  18.5/7 

weeks by dates, edc by sono 5/10/20 - 24 weeks by sonogram came by ambulence c/

o cramps on & off severe & bleeding


History of Present Illness: 


c/o   cramps for 3 days , pt google it felt she has [facility] 


she did not call her MD 


she sees OB doctor in [place], affilliated with [place] ,she is supposed to deliver 

in [facility]


she states she had one ultrasound 


prenatal work up as per pt was neg 





History Source: Patient


Limitations to Obtaining History: No Limitations





- Past Medical History


CNS: Yes: Other (none known)


Cardiovascular: Yes: Other (none known)


Pulmonary: Yes: Asthma (last attack 4 months ago  rx albuterol inhaler)


Gastrointestinal: Yes: Other (declines)


Reproductive: Yes: Other (declines)


...: 2


...Para: 0


...Induced : 1


...LMP: 19


... Weeks Gestation by Dates: 18.5


...EDC by Dates: 20


...EDC by Sono: 05/10/20 (24 weeks by sono )


Heme/Onc: Yes: Other (declined)


Infectious Disease: Yes: Other (declined)


Psych: Yes: Other (declined)





- Past Surgical History


Past Surgical History: Yes: None


Hx Myomectomy: No


Hx Transabdominal Cerclage: No





- Smoking History


Smoking history: Never smoked





- Alcohol/Substance Use


Hx Alcohol Use: No


History of Substance Use: reports: None





Home Medications





- Allergies


Allergies/Adverse Reactions: 


 Allergies











Allergy/AdvReac Type Severity Reaction Status Date / Time


 


No Known Allergies Allergy   Verified 20 16:01














- Home Medications


Home Medications: 


Ambulatory Orders





Prenat 115/Iron Fum/Folic/Dss [Prenatal 19 Tablet] 1 tab PO DAILY 20 











Physical Exam - Maternity


Vital Signs: 


 Vital Signs











Temperature  98.4 F   20 15:10


 


Pulse Rate  98 H  20 15:10


 


Respiratory Rate  19   20 15:10


 


Blood Pressure  129/85   20 15:10


 


O2 Sat by Pulse Oximetry (%)  97   20 15:10











Constitutional: Yes: Well Nourished, Severe Distress, Obese


Eyes: Yes: WNL


HENT: Yes: WNL


Neck: Yes: WNL, Rigid


Cardiovascular: Yes: Pulse Irregular


Lungs: Clear to auscultation


Breast(s): Yes: WNL (large breasts)





- Abdominal Exam/OB


Fundal Height: 22


Number of Fetuses: Single


Fetal Presentation: Vertex


Contractions: Yes


Regularity: Irregular


Intensity: Mod/Strong


Fetal Heart Rate (range): 150


Fetal Heart Rate Location: LLQ (unable to monitor FHR tracing, loss of contact)


Decelerations: None





- Vaginal Exam/OB


Vaginal Bleediing: Bloody Show


Speculum Exam: Yes


Dilatation (cm): 7-8 cm


Effacement (%): 100


Fetal Presentation: Vertex/Position


Fetal Station: -2





- Physical Exam


Musculoskeletal: Yes: Muscle Weakness


Extremities: Yes: WNL.  No: Calf Tenderness


Edema: LLE: 1+, RLE: 1+


Integumentary: Yes: Tattoos


Deep Tendon Reflex Grade: Normal +2


...Motor Strength: WNL


Psychiatric: Yes: WNL, Alert, Oriented





- Labs


Lab Results: 





 Laboratory Tests











  20





  16:50 16:50 18:30


 


WBC    25.1 H


 


RBC    3.72


 


Hgb    12.1


 


Hct    35.4


 


MCV    95.2


 


MCH    32.6


 


MCHC    34.3


 


Plt Count    327


 


PT with INR   


 


INR   


 


PTT (Actin FS)   


 


Urine Protein   1+ H 


 


Urine Blood   2+ H 


 


U Marijuana (THC) Screen  Positive A*  


 


HIV 1&2 Antibody Screen   


 


HIV P24 Antigen   














  20





  18:30 19:28


 


WBC  


 


RBC  


 


Hgb  


 


Hct  


 


MCV  


 


MCH  


 


MCHC  


 


Plt Count  


 


PT with INR  11.90 


 


INR  1.01 


 


PTT (Actin FS)  30.5 


 


Urine Protein  


 


Urine Blood  


 


U Marijuana (THC) Screen  


 


HIV 1&2 Antibody Screen   Negative


 


HIV P24 Antigen   Negative














Problem List





- Problems


(1) 24 weeks gestation of pregnancy


Code(s): Z3A.24 - 24 WEEKS GESTATION OF PREGNANCY   





(2)  labor in second trimester


Code(s): O60.02 -  LABOR WITHOUT DELIVERY, SECOND TRIMESTER   


Qualifiers: 


   Fetus number: single or unspecified fetus 





Assessment/Plan


21 yrs  , inevitable  delivery .


bedside  sono was done sliup 23.3 wweks, vx, efw 1'8" ( 675.71 gm ) .


plan delivery vaginal


inform neonatologist None known

## 2023-12-02 NOTE — ED ADULT NURSE NOTE - CHIEF COMPLAINT QUOTE
[facility]a [facility] - a&ox4; per pt admits to being unhappy at assisted living and making "suicidal statements to a councelor" @ his facility and sent for psych eval. pt states he has "no plans to commit suicide or the drive but I feel like my daughter would be better off". MD at bedside for eval. .

## 2023-12-02 NOTE — ED PROVIDER NOTE - NSFOLLOWUPINSTRUCTIONS_ED_ALL_ED_FT
You are advised to please follow up with your primary care doctor within the next 24 hours and return to the Emergency Department for worsening symptoms or any other concerns.  Your doctor may call [phone] to follow up on the specific results of the tests performed today in the emergency department.

## 2023-12-02 NOTE — ED BEHAVIORAL HEALTH ASSESSMENT NOTE - HPI (INCLUDE ILLNESS QUALITY, SEVERITY, DURATION, TIMING, CONTEXT, MODIFYING FACTORS, ASSOCIATED SIGNS AND SYMPTOMS)
Patient is an 89 year old, male; domiciled with fellow residents at [facility]; ; noncaregiver; retired teacher; no past psychiatric history; no psychiatric  hospitalizations; no known suicide attempts; no known history of violence or arrests; no active substance abuse or known history of complicated withdrawal; PMH of A Fib; brought in by EMS; called by [facility] in the setting of reportedly endorsing SI to staff.     Upon assessment, pt presents well related, polite, pleasant, alert and oriented to time, place, person and situation. Pt explains that he "may have said something he didn't mean out of frustration living in a nursing home." Pt states that he has suffered from several losses in his life inc his son when he was 19 mos old, his daughter to an MVA and his wife in 2019. Pt states he has a daughter, Amanda, that lives in [place] who is now his care taker and adds that he has a 96 yo brother living in [place] employed as an author. 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 Pt denies ever being on any type of psychotropic medication and states he is a "pretty intelligent person" and feels he is capable of "working through his problems" as he has done all his life.     Writer left voicemail for daughter as [phone]. 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     Upon assessment, pt presents well related, polite, pleasant, alert and oriented to time, place, person and situation. Pt explains that he "may have said something he didn't mean out of frustration living in a nursing home." Pt states that he has suffered from several losses in his life inc his son when he was 19 mos old, his daughter to an MVA and his wife in 2019. Pt states he has a daughter, Amanda, that lives in [place] who is now his care taker and adds that he has a 94 yo brother living in [place] employed as an author. 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 Pt denies ever being on any type of psychotropic medication and states he is a "pretty intelligent person" and feels he is capable of "working through his problems" as he has done all his life.     Writer left voicemail for daughter as [phone]. 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     Upon assessment, pt presents well related, polite, pleasant, alert and oriented to time, place, person and situation. Pt explains that he "may have said something he didn't mean out of frustration living in a nursing home." Pt states that he has suffered from several losses in his life inc his son when he was 19 mos old, his daughter to an MVA and his wife in 2019. Pt states he has a daughter, Amanda, that lives in [place] who is now his care taker and adds that he has a 94 yo brother living in [place] employed as an author. 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 Pt denies ever being on any type of psychotropic medication and states he is a "pretty intelligent person" and feels he is capable of "working through his problems" as he has done all his life.     Writer left voicemail for daughter as [phone]. 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     Upon assessment, pt presents well related, polite, pleasant, alert and oriented to time, place, person and situation. Pt explains that he "may have said something he didn't mean out of frustration living in a nursing home." Pt states that he has suffered from several losses in his life inc his son when he was 19 mos old, his daughter to an MVA and his wife in 2019. Pt states he has a daughter, Amanda, that lives in [place] who is now his care taker and adds that he has a 96 yo brother living in [place] employed as an author. 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 Pt denies ever being on any type of psychotropic medication and states he is a "pretty intelligent person" and feels he is capable of "working through his problems" as he has done all his life.     Writer spoke with daughter, Amanda at [phone] who denied any acute safety concerns. Daughter states that her father "says things and doesn't realize the significance of what he is saying." Daughter states she intends to find her father a therapist despite his reservations surrounding therapy, as she feels it would be good for him to have someone to speak to aside from herself.  Daughter denies any prior psychiatric history and feels the pt is safe to return to [facility]. 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     Upon assessment, pt presents well related, polite, pleasant, alert and oriented to time, place, person and situation. Pt explains that he "may have said something he didn't mean out of frustration living in a nursing home." Pt states that he has suffered from several losses in his life inc his son when he was 19 mos old, his daughter to an MVA and his wife in 2019. Pt states he has a daughter, Amanda, that lives in [place] who is now his care taker and adds that he has a 94 yo brother living in [place] employed as an author. 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 Pt denies ever being on any type of psychotropic medication and states he is a "pretty intelligent person" and feels he is capable of "working through his problems" as he has done all his life.     Writer spoke with daughter, Amanda at [phone] who denied any acute safety concerns. Daughter states that her father "says things and doesn't realize the significance of what he is saying." Daughter states she intends to find her father a therapist despite his reservations surrounding therapy, as she feels it would be good for him to have someone to speak to aside from herself.  Daughter denies any prior psychiatric history and feels the pt is safe to return to [facility]. 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     Upon assessment, pt presents well related, polite, pleasant, alert and oriented to time, place, person and situation. Pt explains that he "may have said something he didn't mean out of frustration living in a nursing home." Pt states that he has suffered from several losses in his life inc his son when he was 19 mos old, his daughter to an MVA and his wife in 2019. Pt states he has a daughter, Amanda, that lives in [place] who is now his care taker and adds that he has a 94 yo brother living in [place] employed as an author. 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 Pt denies ever being on any type of psychotropic medication and states he is a "pretty intelligent person" and feels he is capable of "working through his problems" as he has done all his life.     Writer spoke with daughter, Amanda at [phone] who denied any acute safety concerns. Daughter states that her father "says things and doesn't realize the significance of what he is saying." Daughter states she intends to find her father a therapist despite his reservations surrounding therapy, as she feels it would be good for him to have someone to speak to aside from herself.  Daughter denies any prior psychiatric history and feels the pt is safe to return to [facility].

## 2023-12-02 NOTE — ED BEHAVIORAL HEALTH ASSESSMENT NOTE - NSSUICPROTFACT_PSY_ALL_CORE
Responsibility to children, family, or others/Identifies reasons for living/Supportive social network of family or friends/Fear of death or the actual act of killing self/Positive therapeutic relationships/Ability to cope with stress/Frustration tolerance

## 2023-12-02 NOTE — ED PROVIDER NOTE - PATIENT PORTAL LINK FT
You can access the FollowMyHealth Patient Portal offered by [facility] by registering at the following website: [email or web address]. By joining [email or web address]’s FollowMyHealth portal, you will also be able to view your health information using other applications (apps) compatible with our system.

## 2023-12-02 NOTE — ED ADULT NURSE NOTE - OBJECTIVE STATEMENT
Assumed care at 1250 pt states he is here because he opened his big mouth and wasn't to go home now, he states he said he thought his daughter was better off if he was dead because she could inherent  thing better, pt denies any SI/HI but states he will deal with whatever he needs to deal with on his own. MD Burns at bedside Assumed care at 1250 pt states he is here because he opened his big mouth and wasn't to go home now, he states he said he thought his daughter was better off if he was dead because she could inherent  thing better, pt denies any SI/HI but states he will deal with whatever he needs to deal with on his own. MD Burns at bedside/ pt undressed and placed in yellow gown

## 2023-12-02 NOTE — ED PROVIDER NOTE - CLINICAL SUMMARY MEDICAL DECISION MAKING FREE TEXT BOX
89-year-old male; PMH significant for A-fib (on Eliquis (, HTN, HLD; now presenting with suicidal ideation.  will do labs and psych consulted for consult. 89-year-old male; PMH significant for A-fib (on Eliquis (, HTN, HLD; now presenting with suicidal ideation.  will do labs and psych consulted for consult.  Psych evaluated patient and cleared for discharge.

## 2024-01-01 ENCOUNTER — EMERGENCY (EMERGENCY)
Facility: HOSPITAL | Age: 89
LOS: 1 days | Discharge: HOME | End: 2024-01-01
Attending: EMERGENCY MEDICINE
Payer: MEDICARE

## 2024-01-01 VITALS
SYSTOLIC BLOOD PRESSURE: 150 MMHG | OXYGEN SATURATION: 100 % | DIASTOLIC BLOOD PRESSURE: 90 MMHG | TEMPERATURE: 97 F | HEART RATE: 94 BPM | RESPIRATION RATE: 18 BRPM

## 2024-01-01 VITALS
HEART RATE: 94 BPM | DIASTOLIC BLOOD PRESSURE: 93 MMHG | SYSTOLIC BLOOD PRESSURE: 169 MMHG | TEMPERATURE: 98 F | RESPIRATION RATE: 18 BRPM | OXYGEN SATURATION: 92 % | WEIGHT: 145.06 LBS

## 2024-01-01 DIAGNOSIS — R29.818 OTHER SYMPTOMS AND SIGNS INVOLVING THE NERVOUS SYSTEM: ICD-10-CM

## 2024-01-01 LAB
ALBUMIN SERPL ELPH-MCNC: 4.1 G/DL — SIGNIFICANT CHANGE UP (ref 3.3–5.2)
ALP SERPL-CCNC: 96 U/L — SIGNIFICANT CHANGE UP (ref 40–120)
ALT FLD-CCNC: 9 U/L — SIGNIFICANT CHANGE UP
ANION GAP SERPL CALC-SCNC: 13 MMOL/L — SIGNIFICANT CHANGE UP (ref 5–17)
APPEARANCE UR: CLEAR — SIGNIFICANT CHANGE UP
APTT BLD: 34.2 SEC — SIGNIFICANT CHANGE UP (ref 24.5–35.6)
AST SERPL-CCNC: 23 U/L — SIGNIFICANT CHANGE UP
BASOPHILS # BLD AUTO: 0.05 K/UL — SIGNIFICANT CHANGE UP (ref 0–0.2)
BASOPHILS NFR BLD AUTO: 0.8 % — SIGNIFICANT CHANGE UP (ref 0–2)
BILIRUB SERPL-MCNC: 0.9 MG/DL — SIGNIFICANT CHANGE UP (ref 0.4–2)
BILIRUB UR-MCNC: NEGATIVE — SIGNIFICANT CHANGE UP
BUN SERPL-MCNC: 22.6 MG/DL — HIGH (ref 8–20)
CALCIUM SERPL-MCNC: 9.2 MG/DL — SIGNIFICANT CHANGE UP (ref 8.4–10.5)
CHLORIDE SERPL-SCNC: 103 MMOL/L — SIGNIFICANT CHANGE UP (ref 96–108)
CO2 SERPL-SCNC: 25 MMOL/L — SIGNIFICANT CHANGE UP (ref 22–29)
COLOR SPEC: YELLOW — SIGNIFICANT CHANGE UP
CREAT SERPL-MCNC: 1.51 MG/DL — HIGH (ref 0.5–1.3)
CULTURE RESULTS: SIGNIFICANT CHANGE UP
DIFF PNL FLD: NEGATIVE — SIGNIFICANT CHANGE UP
EGFR: 44 ML/MIN/1.73M2 — LOW
EGFR: 44 ML/MIN/1.73M2 — LOW
EOSINOPHIL # BLD AUTO: 0.2 K/UL — SIGNIFICANT CHANGE UP (ref 0–0.5)
EOSINOPHIL NFR BLD AUTO: 3.4 % — SIGNIFICANT CHANGE UP (ref 0–6)
GLUCOSE SERPL-MCNC: 110 MG/DL — HIGH (ref 70–99)
GLUCOSE UR QL: NEGATIVE MG/DL — SIGNIFICANT CHANGE UP
HCT VFR BLD CALC: 40.1 % — SIGNIFICANT CHANGE UP (ref 39–50)
HGB BLD-MCNC: 13.5 G/DL — SIGNIFICANT CHANGE UP (ref 13–17)
IMM GRANULOCYTES NFR BLD AUTO: 0.7 % — SIGNIFICANT CHANGE UP (ref 0–0.9)
INR BLD: 1.71 RATIO — HIGH (ref 0.85–1.18)
KETONES UR-MCNC: NEGATIVE MG/DL — SIGNIFICANT CHANGE UP
LEUKOCYTE ESTERASE UR-ACNC: NEGATIVE — SIGNIFICANT CHANGE UP
LYMPHOCYTES # BLD AUTO: 1.76 K/UL — SIGNIFICANT CHANGE UP (ref 1–3.3)
LYMPHOCYTES # BLD AUTO: 29.7 % — SIGNIFICANT CHANGE UP (ref 13–44)
MCHC RBC-ENTMCNC: 30.9 PG — SIGNIFICANT CHANGE UP (ref 27–34)
MCHC RBC-ENTMCNC: 33.7 GM/DL — SIGNIFICANT CHANGE UP (ref 32–36)
MCV RBC AUTO: 91.8 FL — SIGNIFICANT CHANGE UP (ref 80–100)
MONOCYTES # BLD AUTO: 0.67 K/UL — SIGNIFICANT CHANGE UP (ref 0–0.9)
MONOCYTES NFR BLD AUTO: 11.3 % — SIGNIFICANT CHANGE UP (ref 2–14)
NEUTROPHILS # BLD AUTO: 3.2 K/UL — SIGNIFICANT CHANGE UP (ref 1.8–7.4)
NEUTROPHILS NFR BLD AUTO: 54.1 % — SIGNIFICANT CHANGE UP (ref 43–77)
NITRITE UR-MCNC: NEGATIVE — SIGNIFICANT CHANGE UP
NT-PROBNP SERPL-SCNC: 4751 PG/ML — HIGH (ref 0–300)
PH UR: 6.5 — SIGNIFICANT CHANGE UP (ref 5–8)
PLATELET # BLD AUTO: 131 K/UL — LOW (ref 150–400)
POTASSIUM SERPL-MCNC: 4.4 MMOL/L — SIGNIFICANT CHANGE UP (ref 3.5–5.3)
POTASSIUM SERPL-SCNC: 4.4 MMOL/L — SIGNIFICANT CHANGE UP (ref 3.5–5.3)
PROT SERPL-MCNC: 6.5 G/DL — LOW (ref 6.6–8.7)
PROT UR-MCNC: SIGNIFICANT CHANGE UP MG/DL
PROTHROM AB SERPL-ACNC: 18.7 SEC — HIGH (ref 9.5–13)
RBC # BLD: 4.37 M/UL — SIGNIFICANT CHANGE UP (ref 4.2–5.8)
RBC # FLD: 13.6 % — SIGNIFICANT CHANGE UP (ref 10.3–14.5)
SODIUM SERPL-SCNC: 141 MMOL/L — SIGNIFICANT CHANGE UP (ref 135–145)
SP GR SPEC: 1.01 — SIGNIFICANT CHANGE UP (ref 1–1.03)
SPECIMEN SOURCE: SIGNIFICANT CHANGE UP
TROPONIN T, HIGH SENSITIVITY RESULT: 21 NG/L — SIGNIFICANT CHANGE UP (ref 0–51)
TROPONIN T, HIGH SENSITIVITY RESULT: 23 NG/L — SIGNIFICANT CHANGE UP (ref 0–51)
UROBILINOGEN FLD QL: 2 MG/DL (ref 0.2–1)
WBC # BLD: 5.92 K/UL — SIGNIFICANT CHANGE UP (ref 3.8–10.5)
WBC # FLD AUTO: 5.92 K/UL — SIGNIFICANT CHANGE UP (ref 3.8–10.5)

## 2024-01-01 PROCEDURE — 90792 PSYCH DIAG EVAL W/MED SRVCS: CPT

## 2024-01-01 PROCEDURE — 85730 THROMBOPLASTIN TIME PARTIAL: CPT

## 2024-01-01 PROCEDURE — 83880 ASSAY OF NATRIURETIC PEPTIDE: CPT

## 2024-01-01 PROCEDURE — 70450 CT HEAD/BRAIN W/O DYE: CPT | Mod: MC

## 2024-01-01 PROCEDURE — 93970 EXTREMITY STUDY: CPT

## 2024-01-01 PROCEDURE — 70450 CT HEAD/BRAIN W/O DYE: CPT | Mod: 26,MC

## 2024-01-01 PROCEDURE — 71045 X-RAY EXAM CHEST 1 VIEW: CPT

## 2024-01-01 PROCEDURE — 85610 PROTHROMBIN TIME: CPT

## 2024-01-01 PROCEDURE — 36415 COLL VENOUS BLD VENIPUNCTURE: CPT

## 2024-01-01 PROCEDURE — 99239 HOSP IP/OBS DSCHRG MGMT >30: CPT

## 2024-01-01 PROCEDURE — 93005 ELECTROCARDIOGRAM TRACING: CPT

## 2024-01-01 PROCEDURE — G0378: CPT

## 2024-01-01 PROCEDURE — 81003 URINALYSIS AUTO W/O SCOPE: CPT

## 2024-01-01 PROCEDURE — 71045 X-RAY EXAM CHEST 1 VIEW: CPT | Mod: 26

## 2024-01-01 PROCEDURE — 96374 THER/PROPH/DIAG INJ IV PUSH: CPT

## 2024-01-01 PROCEDURE — 84484 ASSAY OF TROPONIN QUANT: CPT

## 2024-01-01 PROCEDURE — 85025 COMPLETE CBC W/AUTO DIFF WBC: CPT

## 2024-01-01 PROCEDURE — 96375 TX/PRO/DX INJ NEW DRUG ADDON: CPT

## 2024-01-01 PROCEDURE — 96372 THER/PROPH/DIAG INJ SC/IM: CPT | Mod: XU

## 2024-01-01 PROCEDURE — 99223 1ST HOSP IP/OBS HIGH 75: CPT

## 2024-01-01 PROCEDURE — 99285 EMERGENCY DEPT VISIT HI MDM: CPT | Mod: 25

## 2024-01-01 PROCEDURE — 99232 SBSQ HOSP IP/OBS MODERATE 35: CPT

## 2024-01-01 PROCEDURE — 87086 URINE CULTURE/COLONY COUNT: CPT

## 2024-01-01 PROCEDURE — 80053 COMPREHEN METABOLIC PANEL: CPT

## 2024-01-01 PROCEDURE — 93010 ELECTROCARDIOGRAM REPORT: CPT

## 2024-01-01 PROCEDURE — 93970 EXTREMITY STUDY: CPT | Mod: 26

## 2024-01-01 RX ORDER — LANOLIN/MINERAL OIL/PETROLATUM
1 OINTMENT (GRAM) OPHTHALMIC (EYE) ONCE
Refills: 0 | Status: COMPLETED | OUTPATIENT
Start: 2024-01-01 | End: 2024-01-01

## 2024-01-01 RX ORDER — OLANZAPINE 10 MG/1
5 TABLET ORAL ONCE
Refills: 0 | Status: DISCONTINUED | OUTPATIENT
Start: 2024-01-01 | End: 2024-01-01

## 2024-01-01 RX ORDER — ATORVASTATIN CALCIUM 80 MG/1
20 TABLET, FILM COATED ORAL AT BEDTIME
Refills: 0 | Status: DISCONTINUED | OUTPATIENT
Start: 2024-01-01 | End: 2024-01-01

## 2024-01-01 RX ORDER — METOPROLOL SUCCINATE 50 MG/1
50 TABLET, EXTENDED RELEASE ORAL
Refills: 0 | Status: DISCONTINUED | OUTPATIENT
Start: 2024-01-01 | End: 2024-01-01

## 2024-01-01 RX ORDER — LORAZEPAM 4 MG/ML
1 VIAL (ML) INJECTION ONCE
Refills: 0 | Status: DISCONTINUED | OUTPATIENT
Start: 2024-01-01 | End: 2024-01-01

## 2024-01-01 RX ORDER — APIXABAN 2.5 MG/1
5 TABLET, FILM COATED ORAL
Refills: 0 | Status: DISCONTINUED | OUTPATIENT
Start: 2024-01-01 | End: 2024-01-01

## 2024-01-01 RX ORDER — LISINOPRIL 5 MG/1
40 TABLET ORAL DAILY
Refills: 0 | Status: DISCONTINUED | OUTPATIENT
Start: 2024-01-01 | End: 2024-01-01

## 2024-01-01 RX ORDER — FUROSEMIDE 10 MG/ML
20 INJECTION INTRAMUSCULAR; INTRAVENOUS ONCE
Refills: 0 | Status: COMPLETED | OUTPATIENT
Start: 2024-01-01 | End: 2024-01-01

## 2024-01-01 RX ORDER — HALOPERIDOL 10 MG/1
5 TABLET ORAL ONCE
Refills: 0 | Status: COMPLETED | OUTPATIENT
Start: 2024-01-01 | End: 2024-01-01

## 2024-01-01 RX ORDER — OLANZAPINE 10 MG/1
5 TABLET ORAL ONCE
Refills: 0 | Status: COMPLETED | OUTPATIENT
Start: 2024-01-01 | End: 2024-01-01

## 2024-01-01 RX ORDER — FUROSEMIDE 10 MG/ML
20 INJECTION INTRAMUSCULAR; INTRAVENOUS DAILY
Refills: 0 | Status: COMPLETED | OUTPATIENT
Start: 2024-01-01 | End: 2024-01-01

## 2024-01-01 RX ORDER — MELATONIN 5 MG
3 TABLET ORAL AT BEDTIME
Refills: 0 | Status: DISCONTINUED | OUTPATIENT
Start: 2024-01-01 | End: 2024-01-01

## 2024-01-01 RX ORDER — POLYETHYLENE GLYCOL 3350 17 G/17G
17 POWDER, FOR SOLUTION ORAL ONCE
Refills: 0 | Status: COMPLETED | OUTPATIENT
Start: 2024-01-01 | End: 2024-01-01

## 2024-01-01 RX ORDER — AMLODIPINE BESYLATE 10 MG/1
5 TABLET ORAL DAILY
Refills: 0 | Status: DISCONTINUED | OUTPATIENT
Start: 2024-01-01 | End: 2024-01-01

## 2024-01-01 RX ADMIN — Medication 3 MILLIGRAM(S): at 21:06

## 2024-01-01 RX ADMIN — HALOPERIDOL 5 MILLIGRAM(S): 10 TABLET ORAL at 17:12

## 2024-01-01 RX ADMIN — AMLODIPINE BESYLATE 5 MILLIGRAM(S): 10 TABLET ORAL at 07:26

## 2024-01-01 RX ADMIN — FUROSEMIDE 20 MILLIGRAM(S): 10 INJECTION INTRAMUSCULAR; INTRAVENOUS at 05:58

## 2024-01-01 RX ADMIN — ATORVASTATIN CALCIUM 20 MILLIGRAM(S): 80 TABLET, FILM COATED ORAL at 22:46

## 2024-01-01 RX ADMIN — APIXABAN 5 MILLIGRAM(S): 2.5 TABLET, FILM COATED ORAL at 05:56

## 2024-01-01 RX ADMIN — LISINOPRIL 40 MILLIGRAM(S): 5 TABLET ORAL at 05:57

## 2024-01-01 RX ADMIN — LISINOPRIL 40 MILLIGRAM(S): 5 TABLET ORAL at 07:28

## 2024-01-01 RX ADMIN — AMLODIPINE BESYLATE 5 MILLIGRAM(S): 10 TABLET ORAL at 05:56

## 2024-01-01 RX ADMIN — HALOPERIDOL 5 MILLIGRAM(S): 10 TABLET ORAL at 00:35

## 2024-01-01 RX ADMIN — POLYETHYLENE GLYCOL 3350 17 GRAM(S): 17 POWDER, FOR SOLUTION ORAL at 16:02

## 2024-01-01 RX ADMIN — METOPROLOL SUCCINATE 50 MILLIGRAM(S): 50 TABLET, EXTENDED RELEASE ORAL at 07:28

## 2024-01-01 RX ADMIN — METOPROLOL SUCCINATE 50 MILLIGRAM(S): 50 TABLET, EXTENDED RELEASE ORAL at 18:42

## 2024-01-01 RX ADMIN — METOPROLOL SUCCINATE 50 MILLIGRAM(S): 50 TABLET, EXTENDED RELEASE ORAL at 19:32

## 2024-01-01 RX ADMIN — LISINOPRIL 40 MILLIGRAM(S): 5 TABLET ORAL at 07:37

## 2024-01-01 RX ADMIN — APIXABAN 5 MILLIGRAM(S): 2.5 TABLET, FILM COATED ORAL at 07:37

## 2024-01-01 RX ADMIN — FUROSEMIDE 20 MILLIGRAM(S): 10 INJECTION INTRAMUSCULAR; INTRAVENOUS at 14:51

## 2024-01-01 RX ADMIN — ATORVASTATIN CALCIUM 20 MILLIGRAM(S): 80 TABLET, FILM COATED ORAL at 22:45

## 2024-01-01 RX ADMIN — Medication 3 MILLIGRAM(S): at 22:45

## 2024-01-01 RX ADMIN — FUROSEMIDE 20 MILLIGRAM(S): 10 INJECTION INTRAMUSCULAR; INTRAVENOUS at 07:28

## 2024-01-01 RX ADMIN — OLANZAPINE 5 MILLIGRAM(S): 10 TABLET ORAL at 19:41

## 2024-01-01 RX ADMIN — Medication 3 MILLIGRAM(S): at 22:46

## 2024-01-01 RX ADMIN — ATORVASTATIN CALCIUM 20 MILLIGRAM(S): 80 TABLET, FILM COATED ORAL at 21:06

## 2024-01-01 RX ADMIN — METOPROLOL SUCCINATE 50 MILLIGRAM(S): 50 TABLET, EXTENDED RELEASE ORAL at 05:57

## 2024-01-01 RX ADMIN — FUROSEMIDE 20 MILLIGRAM(S): 10 INJECTION INTRAMUSCULAR; INTRAVENOUS at 07:37

## 2024-01-01 RX ADMIN — METOPROLOL SUCCINATE 50 MILLIGRAM(S): 50 TABLET, EXTENDED RELEASE ORAL at 07:37

## 2024-01-01 RX ADMIN — APIXABAN 5 MILLIGRAM(S): 2.5 TABLET, FILM COATED ORAL at 07:28

## 2024-01-01 RX ADMIN — Medication 1 MILLIGRAM(S): at 00:41

## 2024-01-01 RX ADMIN — AMLODIPINE BESYLATE 5 MILLIGRAM(S): 10 TABLET ORAL at 07:37

## 2024-01-01 RX ADMIN — APIXABAN 5 MILLIGRAM(S): 2.5 TABLET, FILM COATED ORAL at 19:32

## 2024-01-01 RX ADMIN — APIXABAN 5 MILLIGRAM(S): 2.5 TABLET, FILM COATED ORAL at 18:42

## 2024-06-09 ENCOUNTER — EMERGENCY (EMERGENCY)
Facility: HOSPITAL | Age: 89
LOS: 1 days | Discharge: HOME | End: 2024-06-09
Attending: EMERGENCY MEDICINE
Payer: MEDICARE

## 2024-06-09 VITALS
TEMPERATURE: 98 F | DIASTOLIC BLOOD PRESSURE: 90 MMHG | RESPIRATION RATE: 14 BRPM | HEART RATE: 94 BPM | SYSTOLIC BLOOD PRESSURE: 158 MMHG | OXYGEN SATURATION: 96 %

## 2024-06-09 VITALS
TEMPERATURE: 98 F | DIASTOLIC BLOOD PRESSURE: 65 MMHG | SYSTOLIC BLOOD PRESSURE: 169 MMHG | HEART RATE: 93 BPM | RESPIRATION RATE: 18 BRPM | OXYGEN SATURATION: 98 %

## 2024-06-09 PROCEDURE — 70450 CT HEAD/BRAIN W/O DYE: CPT | Mod: MC

## 2024-06-09 PROCEDURE — 99284 EMERGENCY DEPT VISIT MOD MDM: CPT | Mod: 25

## 2024-06-09 PROCEDURE — 99284 EMERGENCY DEPT VISIT MOD MDM: CPT | Mod: GC

## 2024-06-09 PROCEDURE — 72125 CT NECK SPINE W/O DYE: CPT | Mod: 26,MC

## 2024-06-09 PROCEDURE — 72125 CT NECK SPINE W/O DYE: CPT | Mod: MC

## 2024-06-09 PROCEDURE — 70450 CT HEAD/BRAIN W/O DYE: CPT | Mod: 26,MC

## 2024-06-09 NOTE — ED ADULT NURSE REASSESSMENT NOTE - NS ED NURSE REASSESS COMMENT FT1
pt care assumed at 0730, no apparent distress noted at this time, charting as noted. Pt received A&Ox4 resting in bed comfortably at this time. Pt denies pain or discomfort. Hematoma noted to left side of forehead. Pt has been medically cleared, and is awaiting ambulance back to [facility].
Called pt brother at [phone] to update him per pts request

## 2024-06-09 NOTE — ED PROVIDER NOTE - OBJECTIVE STATEMENT
89-year-old male past medical history occluding A-fib on Eliquis, hypertension, hyperlipidemia presenting for fall.  Patient was adjusting his shower curtain and states he slipped on his new bathroom rug.  Struck his forehead above his left eye.  There is a small abrasion but no laceration.  Denies any loss of consciousness, nausea, vomiting, neck pain, chest pain, shortness of breath, fever, chills, abdominal pain, diarrhea.

## 2024-06-09 NOTE — ED PROVIDER NOTE - NSFOLLOWUPINSTRUCTIONS_ED_ALL_ED_FT
Fall Prevention in the Home, Adult  Falls can cause injuries and affect people of all ages. There are many simple things that you can do to make your home safe and to help prevent falls.    If you need it, ask for help making these changes.    What actions can I take to prevent falls?  General information    Use good lighting in all rooms. Make sure to:  Replace any light bulbs that burn out.  Turn on lights if it is dark and use night-lights.  Keep items that you use often in easy-to-reach places. Lower the shelves around your home if needed.  Move furniture so that there are clear paths around it.  Do not keep throw rugs or other things on the floor that can make you trip.  If any of your floors are uneven, fix them.  Add color or contrast paint or tape to clearly анна and help you see:  Grab bars or handrails.  First and last steps of staircases.  Where the edge of each step is.  If you use a ladder or stepladder:  Make sure that it is fully opened. Do not climb a closed ladder.  Make sure the sides of the ladder are locked in place.  Have someone hold the ladder while you use it.  Know where your pets are as you move through your home.  What can I do in the bathroom?    A grab bar next to a toilet.  Shower and bathtub, showing safety grab bars on the walls.  Keep the floor dry. Clean up any water that is on the floor right away.  Remove soap buildup in the bathtub or shower. Buildup makes bathtubs and showers slippery.  Use non-skid mats or decals on the floor of the bathtub or shower.  Attach bath mats securely with double-sided, non-slip rug tape.  If you need to sit down while you are in the shower, use a non-slip stool.  Install grab bars by the toilet and in the bathtub and shower. Do not use towel bars as grab bars.  What can I do in the bedroom?    Make sure that you have a light by your bed that is easy to reach.  Do not use any sheets or blankets on your bed that hang to the floor.  Have a firm bench or chair with side arms that you can use for support when you get dressed.  What can I do in the kitchen?    Clean up any spills right away.  If you need to reach something above you, use a sturdy step stool that has a grab bar.  Keep electrical cables out of the way.  Do not use floor polish or wax that makes floors slippery.  What can I do with my stairs?    Do not leave anything on the stairs.  Make sure that you have a light switch at the top and the bottom of the stairs. Have them installed if you do not have them.  Make sure that there are handrails on both sides of the stairs. Fix handrails that are broken or loose. Make sure that handrails are as long as the staircases.  Install non-slip stair treads on all stairs in your home if they do not have carpet.  Avoid having throw rugs at the top or bottom of stairs, or secure the rugs with carpet tape to prevent them from moving.  Choose a carpet design that does not hide the edge of steps on the stairs. Make sure that carpet is firmly attached to the stairs. Fix any carpet that is loose or worn.  What can I do on the outside of my home?    Use bright outdoor lighting.  Repair the edges of walkways and driveways and fix any cracks. Clear paths of anything that can make you trip, such as tools or rocks.  Add color or contrast paint or tape to clearly анна and help you see high doorway thresholds.  Trim any bushes or trees on the main path into your home.  Check that handrails are securely fastened and in good repair. Both sides of all steps should have handrails.  Install guardrails along the edges of any raised decks or porches.  Have leaves, snow, and ice cleared regularly. Use sand, salt, or ice melt on walkways during winter months if you live where there is ice and snow.  In the garage, clean up any spills right away, including grease or oil spills.  What other actions can I take?    Review your medicines with your health care provider. Some medicines can make you confused or feel dizzy. This can increase your chance of falling.  Wear closed-toe shoes that fit well and support your feet. Wear shoes that have rubber soles and low heels.  Use a cane, walker, scooter, or crutches that help you move around if needed.  Talk with your provider about other ways that you can decrease your risk of falls. This may include seeing a physical therapist to learn to do exercises to improve movement and strength.    Where to find more information  Centers for Disease Control and PreventionWENDI: cdc.gov  National [place] on Aging: jimbo.nih.gov  National [place] on Aging: jimbo.nih.gov  Contact a health care provider if:  You are afraid of falling at home.  You feel weak, drowsy, or dizzy at home.  You fall at home.  Get help right away if you:  Lose consciousness or have trouble moving after a fall.  Have a fall that causes a head injury.  These symptoms may be an emergency. Get help right away. Call 911.  Do not wait to see if the symptoms will go away.  Do not drive yourself to the hospital.  This information is not intended to replace advice given to you by your health care provider. Make sure you discuss any questions you have with your health care provider.

## 2024-06-09 NOTE — ED PROVIDER NOTE - PATIENT PORTAL LINK FT
You can access the FollowMyHealth Patient Portal offered by [facility] by registering at the following website: [email or web address]. By joining [facility]’s FollowMyHealth portal, you will also be able to view your health information using other applications (apps) compatible with our system.

## 2024-06-09 NOTE — ED ADULT TRIAGE NOTE - CHIEF COMPLAINT QUOTE
Pt. BIBEMS from [facility] s/p slip and fall in the shower. +head strike, denies LOC. On Eliquis. Laceration and bump noted above L eyebrow.

## 2024-06-09 NOTE — ED PROVIDER NOTE - ATTENDING CONTRIBUTION TO CARE
89 M status post mechanical fall on AC apixaban, on exam a small scalp abrasion however ambulatory without difficulty, breathing is nonlabored.  CT imaging shows no acute process, normal repeat evaluation, discharged home.

## 2024-06-09 NOTE — ED PROVIDER NOTE - CLINICAL SUMMARY MEDICAL DECISION MAKING FREE TEXT BOX
89-year-old male past medical history occluding A-fib on Eliquis, hypertension, hyperlipidemia presenting for fall.    Patient is presenting for a mechanical fall.   Story was confirmed with [facility] staff. CT head neck negative.  Small abrasion does not require any repair.  Ambulated at baseline.  Usually uses a cane and required minimal assistance.  Will discharge home.

## 2024-06-09 NOTE — ED PROVIDER NOTE - PHYSICAL EXAMINATION
General: well appearing, NAD  Head: NC, 0.5 abrasion to left eyebrow  EENT: EOMI, no scleral icterus  Cardiac: RRR, no apparent murmurs, no lower extremity edema  Respiratory: CTABL, no respiratory distress   Abdomen: soft, ND, NT, nonperitonitic  MSK/Vascular: full ROM, soft compartments, warm extremities  Neuro: AAOx3, sensation to light touch intact, 5/5x4, walked with minimal assistance at baseline.  Psych: calm, cooperative

## 2024-06-09 NOTE — ED ADULT NURSE NOTE - NSFALLHARMRISKINTERV_ED_ALL_ED

## 2024-06-09 NOTE — ED ADULT NURSE NOTE - OBJECTIVE STATEMENT
pt. states he was showering and slipped walking out of the shower. pt. endorses being on Eliquis, and falll with head strike. pt. oriented, but seems confused when answering questions and takes extended time to respond to simple questions while adding detail not related to the story. pt. showing no signs of distress at this time .

## 2024-07-13 ENCOUNTER — EMERGENCY (EMERGENCY)
Facility: HOSPITAL | Age: 89
LOS: 1 days | Discharge: HOME | End: 2024-07-13
Attending: EMERGENCY MEDICINE
Payer: MEDICARE

## 2024-07-13 VITALS
DIASTOLIC BLOOD PRESSURE: 62 MMHG | RESPIRATION RATE: 19 BRPM | SYSTOLIC BLOOD PRESSURE: 139 MMHG | OXYGEN SATURATION: 95 % | TEMPERATURE: 98 F | HEART RATE: 76 BPM

## 2024-07-13 VITALS
OXYGEN SATURATION: 100 % | HEART RATE: 88 BPM | WEIGHT: 179.9 LBS | SYSTOLIC BLOOD PRESSURE: 147 MMHG | DIASTOLIC BLOOD PRESSURE: 71 MMHG | TEMPERATURE: 98 F | RESPIRATION RATE: 20 BRPM | HEIGHT: 72 IN

## 2024-07-13 PROCEDURE — 70450 CT HEAD/BRAIN W/O DYE: CPT | Mod: 26,MC

## 2024-07-13 PROCEDURE — 72192 CT PELVIS W/O DYE: CPT | Mod: 26,MC

## 2024-07-13 PROCEDURE — 72192 CT PELVIS W/O DYE: CPT | Mod: MC

## 2024-07-13 PROCEDURE — 99284 EMERGENCY DEPT VISIT MOD MDM: CPT | Mod: 25

## 2024-07-13 PROCEDURE — 70450 CT HEAD/BRAIN W/O DYE: CPT | Mod: MC

## 2024-07-13 PROCEDURE — 72131 CT LUMBAR SPINE W/O DYE: CPT | Mod: MC

## 2024-07-13 PROCEDURE — 72125 CT NECK SPINE W/O DYE: CPT | Mod: 26,MC

## 2024-07-13 PROCEDURE — 96372 THER/PROPH/DIAG INJ SC/IM: CPT

## 2024-07-13 PROCEDURE — 99285 EMERGENCY DEPT VISIT HI MDM: CPT

## 2024-07-13 PROCEDURE — 72131 CT LUMBAR SPINE W/O DYE: CPT | Mod: 26,MC

## 2024-07-13 PROCEDURE — 72125 CT NECK SPINE W/O DYE: CPT | Mod: MC

## 2024-07-13 RX ORDER — HALOPERIDOL DECANOATE 100 MG/ML
5 VIAL (ML) INTRAMUSCULAR ONCE
Refills: 0 | Status: COMPLETED | OUTPATIENT
Start: 2024-07-13 | End: 2024-07-13

## 2024-07-13 RX ADMIN — Medication 5 MILLIGRAM(S): at 12:26

## 2024-08-16 ENCOUNTER — RESULT REVIEW (OUTPATIENT)
Age: 89
End: 2024-08-16

## 2024-08-16 ENCOUNTER — INPATIENT (INPATIENT)
Facility: HOSPITAL | Age: 89
LOS: 4 days | Discharge: NURSING, REHAB OR LONG-TERM CARE | DRG: 204 | End: 2024-08-21
Attending: STUDENT IN AN ORGANIZED HEALTH CARE EDUCATION/TRAINING PROGRAM | Admitting: STUDENT IN AN ORGANIZED HEALTH CARE EDUCATION/TRAINING PROGRAM
Payer: MEDICARE

## 2024-08-16 VITALS
HEIGHT: 72 IN | HEART RATE: 107 BPM | RESPIRATION RATE: 18 BRPM | SYSTOLIC BLOOD PRESSURE: 156 MMHG | OXYGEN SATURATION: 98 % | DIASTOLIC BLOOD PRESSURE: 84 MMHG | TEMPERATURE: 98 F

## 2024-08-16 DIAGNOSIS — S22.42XA MULTIPLE FRACTURES OF RIBS, LEFT SIDE, INITIAL ENCOUNTER FOR CLOSED FRACTURE: ICD-10-CM

## 2024-08-16 DIAGNOSIS — R04.89 HEMORRHAGE FROM OTHER SITES IN RESPIRATORY PASSAGES: ICD-10-CM

## 2024-08-16 LAB
ABO RH CONFIRMATION: SIGNIFICANT CHANGE UP
ALBUMIN SERPL ELPH-MCNC: 3.3 G/DL — SIGNIFICANT CHANGE UP (ref 3.3–5.2)
ALBUMIN SERPL ELPH-MCNC: 3.9 G/DL — SIGNIFICANT CHANGE UP (ref 3.3–5.2)
ALP SERPL-CCNC: 106 U/L — SIGNIFICANT CHANGE UP (ref 40–120)
ALP SERPL-CCNC: 82 U/L — SIGNIFICANT CHANGE UP (ref 40–120)
ALT FLD-CCNC: 13 U/L — SIGNIFICANT CHANGE UP
ALT FLD-CCNC: 14 U/L — SIGNIFICANT CHANGE UP
ANION GAP SERPL CALC-SCNC: 15 MMOL/L — SIGNIFICANT CHANGE UP (ref 5–17)
ANION GAP SERPL CALC-SCNC: 16 MMOL/L — SIGNIFICANT CHANGE UP (ref 5–17)
ANION GAP SERPL CALC-SCNC: 17 MMOL/L — SIGNIFICANT CHANGE UP (ref 5–17)
APTT BLD: 29 SEC — SIGNIFICANT CHANGE UP (ref 24.5–35.6)
APTT BLD: 29.1 SEC — SIGNIFICANT CHANGE UP (ref 24.5–35.6)
AST SERPL-CCNC: 26 U/L — SIGNIFICANT CHANGE UP
AST SERPL-CCNC: 29 U/L — SIGNIFICANT CHANGE UP
BASOPHILS # BLD AUTO: 0.02 K/UL — SIGNIFICANT CHANGE UP (ref 0–0.2)
BASOPHILS # BLD AUTO: 0.02 K/UL — SIGNIFICANT CHANGE UP (ref 0–0.2)
BASOPHILS # BLD AUTO: 0.03 K/UL — SIGNIFICANT CHANGE UP (ref 0–0.2)
BASOPHILS NFR BLD AUTO: 0.2 % — SIGNIFICANT CHANGE UP (ref 0–2)
BASOPHILS NFR BLD AUTO: 0.2 % — SIGNIFICANT CHANGE UP (ref 0–2)
BASOPHILS NFR BLD AUTO: 0.3 % — SIGNIFICANT CHANGE UP (ref 0–2)
BILIRUB SERPL-MCNC: 0.9 MG/DL — SIGNIFICANT CHANGE UP (ref 0.4–2)
BILIRUB SERPL-MCNC: 1.2 MG/DL — SIGNIFICANT CHANGE UP (ref 0.4–2)
BLD GP AB SCN SERPL QL: SIGNIFICANT CHANGE UP
BUN SERPL-MCNC: 22.6 MG/DL — HIGH (ref 8–20)
BUN SERPL-MCNC: 24 MG/DL — HIGH (ref 8–20)
BUN SERPL-MCNC: 26.1 MG/DL — HIGH (ref 8–20)
CALCIUM SERPL-MCNC: 8.2 MG/DL — LOW (ref 8.4–10.5)
CALCIUM SERPL-MCNC: 8.6 MG/DL — SIGNIFICANT CHANGE UP (ref 8.4–10.5)
CALCIUM SERPL-MCNC: 8.9 MG/DL — SIGNIFICANT CHANGE UP (ref 8.4–10.5)
CHLORIDE SERPL-SCNC: 101 MMOL/L — SIGNIFICANT CHANGE UP (ref 96–108)
CHLORIDE SERPL-SCNC: 102 MMOL/L — SIGNIFICANT CHANGE UP (ref 96–108)
CHLORIDE SERPL-SCNC: 103 MMOL/L — SIGNIFICANT CHANGE UP (ref 96–108)
CK SERPL-CCNC: 122 U/L — SIGNIFICANT CHANGE UP (ref 30–200)
CO2 SERPL-SCNC: 20 MMOL/L — LOW (ref 22–29)
CO2 SERPL-SCNC: 20 MMOL/L — LOW (ref 22–29)
CO2 SERPL-SCNC: 22 MMOL/L — SIGNIFICANT CHANGE UP (ref 22–29)
CREAT SERPL-MCNC: 1 MG/DL — SIGNIFICANT CHANGE UP (ref 0.5–1.3)
CREAT SERPL-MCNC: 1.36 MG/DL — HIGH (ref 0.5–1.3)
CREAT SERPL-MCNC: 1.4 MG/DL — HIGH (ref 0.5–1.3)
EGFR: 48 ML/MIN/1.73M2 — LOW
EGFR: 50 ML/MIN/1.73M2 — LOW
EGFR: 72 ML/MIN/1.73M2 — SIGNIFICANT CHANGE UP
EOSINOPHIL # BLD AUTO: 0.01 K/UL — SIGNIFICANT CHANGE UP (ref 0–0.5)
EOSINOPHIL # BLD AUTO: 0.02 K/UL — SIGNIFICANT CHANGE UP (ref 0–0.5)
EOSINOPHIL # BLD AUTO: 0.12 K/UL — SIGNIFICANT CHANGE UP (ref 0–0.5)
EOSINOPHIL NFR BLD AUTO: 0.1 % — SIGNIFICANT CHANGE UP (ref 0–6)
EOSINOPHIL NFR BLD AUTO: 0.2 % — SIGNIFICANT CHANGE UP (ref 0–6)
EOSINOPHIL NFR BLD AUTO: 1 % — SIGNIFICANT CHANGE UP (ref 0–6)
GAS PNL BLDA: SIGNIFICANT CHANGE UP
GAS PNL BLDA: SIGNIFICANT CHANGE UP
GAS PNL BLDV: SIGNIFICANT CHANGE UP
GLUCOSE SERPL-MCNC: 139 MG/DL — HIGH (ref 70–99)
GLUCOSE SERPL-MCNC: 150 MG/DL — HIGH (ref 70–99)
GLUCOSE SERPL-MCNC: 150 MG/DL — HIGH (ref 70–99)
HCT VFR BLD CALC: 29.2 % — LOW (ref 39–50)
HCT VFR BLD CALC: 31.1 % — LOW (ref 39–50)
HCT VFR BLD CALC: 31.8 % — LOW (ref 39–50)
HCT VFR BLD CALC: 32.4 % — LOW (ref 39–50)
HCT VFR BLD CALC: 36 % — LOW (ref 39–50)
HGB BLD-MCNC: 10.4 G/DL — LOW (ref 13–17)
HGB BLD-MCNC: 10.6 G/DL — LOW (ref 13–17)
HGB BLD-MCNC: 10.7 G/DL — LOW (ref 13–17)
HGB BLD-MCNC: 12.1 G/DL — LOW (ref 13–17)
HGB BLD-MCNC: 9.8 G/DL — LOW (ref 13–17)
IMM GRANULOCYTES NFR BLD AUTO: 0.4 % — SIGNIFICANT CHANGE UP (ref 0–0.9)
IMM GRANULOCYTES NFR BLD AUTO: 0.5 % — SIGNIFICANT CHANGE UP (ref 0–0.9)
IMM GRANULOCYTES NFR BLD AUTO: 1.2 % — HIGH (ref 0–0.9)
INR BLD: 1.21 RATIO — HIGH (ref 0.85–1.18)
INR BLD: 1.41 RATIO — HIGH (ref 0.85–1.18)
LACTATE BLDV-MCNC: 2.6 MMOL/L — HIGH (ref 0.5–2)
LACTATE SERPL-SCNC: 4.1 MMOL/L — CRITICAL HIGH (ref 0.5–2)
LACTATE SERPL-SCNC: 5 MMOL/L — CRITICAL HIGH (ref 0.5–2)
LYMPHOCYTES # BLD AUTO: 1.27 K/UL — SIGNIFICANT CHANGE UP (ref 1–3.3)
LYMPHOCYTES # BLD AUTO: 1.35 K/UL — SIGNIFICANT CHANGE UP (ref 1–3.3)
LYMPHOCYTES # BLD AUTO: 1.63 K/UL — SIGNIFICANT CHANGE UP (ref 1–3.3)
LYMPHOCYTES # BLD AUTO: 11.2 % — LOW (ref 13–44)
LYMPHOCYTES # BLD AUTO: 11.5 % — LOW (ref 13–44)
LYMPHOCYTES # BLD AUTO: 14 % — SIGNIFICANT CHANGE UP (ref 13–44)
MAGNESIUM SERPL-MCNC: 1.9 MG/DL — SIGNIFICANT CHANGE UP (ref 1.6–2.6)
MAGNESIUM SERPL-MCNC: 1.9 MG/DL — SIGNIFICANT CHANGE UP (ref 1.6–2.6)
MCHC RBC-ENTMCNC: 30.1 PG — SIGNIFICANT CHANGE UP (ref 27–34)
MCHC RBC-ENTMCNC: 30.2 PG — SIGNIFICANT CHANGE UP (ref 27–34)
MCHC RBC-ENTMCNC: 30.3 PG — SIGNIFICANT CHANGE UP (ref 27–34)
MCHC RBC-ENTMCNC: 30.8 PG — SIGNIFICANT CHANGE UP (ref 27–34)
MCHC RBC-ENTMCNC: 30.8 PG — SIGNIFICANT CHANGE UP (ref 27–34)
MCHC RBC-ENTMCNC: 32.7 GM/DL — SIGNIFICANT CHANGE UP (ref 32–36)
MCHC RBC-ENTMCNC: 33 GM/DL — SIGNIFICANT CHANGE UP (ref 32–36)
MCHC RBC-ENTMCNC: 33.6 GM/DL — SIGNIFICANT CHANGE UP (ref 32–36)
MCHC RBC-ENTMCNC: 33.6 GM/DL — SIGNIFICANT CHANGE UP (ref 32–36)
MCHC RBC-ENTMCNC: 34.1 GM/DL — SIGNIFICANT CHANGE UP (ref 32–36)
MCV RBC AUTO: 90 FL — SIGNIFICANT CHANGE UP (ref 80–100)
MCV RBC AUTO: 90.4 FL — SIGNIFICANT CHANGE UP (ref 80–100)
MCV RBC AUTO: 91.3 FL — SIGNIFICANT CHANGE UP (ref 80–100)
MCV RBC AUTO: 91.8 FL — SIGNIFICANT CHANGE UP (ref 80–100)
MCV RBC AUTO: 92.4 FL — SIGNIFICANT CHANGE UP (ref 80–100)
MONOCYTES # BLD AUTO: 0.88 K/UL — SIGNIFICANT CHANGE UP (ref 0–0.9)
MONOCYTES # BLD AUTO: 0.89 K/UL — SIGNIFICANT CHANGE UP (ref 0–0.9)
MONOCYTES # BLD AUTO: 1.07 K/UL — HIGH (ref 0–0.9)
MONOCYTES NFR BLD AUTO: 7.6 % — SIGNIFICANT CHANGE UP (ref 2–14)
MONOCYTES NFR BLD AUTO: 7.8 % — SIGNIFICANT CHANGE UP (ref 2–14)
MONOCYTES NFR BLD AUTO: 9.1 % — SIGNIFICANT CHANGE UP (ref 2–14)
MRSA PCR RESULT.: SIGNIFICANT CHANGE UP
NEUTROPHILS # BLD AUTO: 8.82 K/UL — HIGH (ref 1.8–7.4)
NEUTROPHILS # BLD AUTO: 9.13 K/UL — HIGH (ref 1.8–7.4)
NEUTROPHILS # BLD AUTO: 9.27 K/UL — HIGH (ref 1.8–7.4)
NEUTROPHILS NFR BLD AUTO: 75.9 % — SIGNIFICANT CHANGE UP (ref 43–77)
NEUTROPHILS NFR BLD AUTO: 78.6 % — HIGH (ref 43–77)
NEUTROPHILS NFR BLD AUTO: 80.2 % — HIGH (ref 43–77)
PHOSPHATE SERPL-MCNC: 3.8 MG/DL — SIGNIFICANT CHANGE UP (ref 2.4–4.7)
PHOSPHATE SERPL-MCNC: 3.9 MG/DL — SIGNIFICANT CHANGE UP (ref 2.4–4.7)
PLATELET # BLD AUTO: 111 K/UL — LOW (ref 150–400)
PLATELET # BLD AUTO: 112 K/UL — LOW (ref 150–400)
PLATELET # BLD AUTO: 121 K/UL — LOW (ref 150–400)
PLATELET # BLD AUTO: 127 K/UL — LOW (ref 150–400)
PLATELET # BLD AUTO: 139 K/UL — LOW (ref 150–400)
POTASSIUM SERPL-MCNC: 3.6 MMOL/L — SIGNIFICANT CHANGE UP (ref 3.5–5.3)
POTASSIUM SERPL-MCNC: 4.2 MMOL/L — SIGNIFICANT CHANGE UP (ref 3.5–5.3)
POTASSIUM SERPL-MCNC: 5.2 MMOL/L — SIGNIFICANT CHANGE UP (ref 3.5–5.3)
POTASSIUM SERPL-SCNC: 3.6 MMOL/L — SIGNIFICANT CHANGE UP (ref 3.5–5.3)
POTASSIUM SERPL-SCNC: 4.2 MMOL/L — SIGNIFICANT CHANGE UP (ref 3.5–5.3)
POTASSIUM SERPL-SCNC: 5.2 MMOL/L — SIGNIFICANT CHANGE UP (ref 3.5–5.3)
PROT SERPL-MCNC: 5.6 G/DL — LOW (ref 6.6–8.7)
PROT SERPL-MCNC: 6.5 G/DL — LOW (ref 6.6–8.7)
PROTHROM AB SERPL-ACNC: 13.4 SEC — HIGH (ref 9.5–13)
PROTHROM AB SERPL-ACNC: 15.5 SEC — HIGH (ref 9.5–13)
RBC # BLD: 3.18 M/UL — LOW (ref 4.2–5.8)
RBC # BLD: 3.44 M/UL — LOW (ref 4.2–5.8)
RBC # BLD: 3.44 M/UL — LOW (ref 4.2–5.8)
RBC # BLD: 3.55 M/UL — LOW (ref 4.2–5.8)
RBC # BLD: 4 M/UL — LOW (ref 4.2–5.8)
RBC # FLD: 13.3 % — SIGNIFICANT CHANGE UP (ref 10.3–14.5)
RBC # FLD: 13.4 % — SIGNIFICANT CHANGE UP (ref 10.3–14.5)
RBC # FLD: 13.5 % — SIGNIFICANT CHANGE UP (ref 10.3–14.5)
RBC # FLD: 13.5 % — SIGNIFICANT CHANGE UP (ref 10.3–14.5)
RBC # FLD: 13.9 % — SIGNIFICANT CHANGE UP (ref 10.3–14.5)
S AUREUS DNA NOSE QL NAA+PROBE: SIGNIFICANT CHANGE UP
SODIUM SERPL-SCNC: 137 MMOL/L — SIGNIFICANT CHANGE UP (ref 135–145)
SODIUM SERPL-SCNC: 138 MMOL/L — SIGNIFICANT CHANGE UP (ref 135–145)
SODIUM SERPL-SCNC: 141 MMOL/L — SIGNIFICANT CHANGE UP (ref 135–145)
TROPONIN T, HIGH SENSITIVITY RESULT: 22 NG/L — SIGNIFICANT CHANGE UP (ref 0–51)
TROPONIN T, HIGH SENSITIVITY RESULT: 26 NG/L — SIGNIFICANT CHANGE UP (ref 0–51)
WBC # BLD: 11.37 K/UL — HIGH (ref 3.8–10.5)
WBC # BLD: 11.62 K/UL — HIGH (ref 3.8–10.5)
WBC # BLD: 11.78 K/UL — HIGH (ref 3.8–10.5)
WBC # BLD: 12.12 K/UL — HIGH (ref 3.8–10.5)
WBC # BLD: 8.99 K/UL — SIGNIFICANT CHANGE UP (ref 3.8–10.5)
WBC # FLD AUTO: 11.37 K/UL — HIGH (ref 3.8–10.5)
WBC # FLD AUTO: 11.62 K/UL — HIGH (ref 3.8–10.5)
WBC # FLD AUTO: 11.78 K/UL — HIGH (ref 3.8–10.5)
WBC # FLD AUTO: 12.12 K/UL — HIGH (ref 3.8–10.5)
WBC # FLD AUTO: 8.99 K/UL — SIGNIFICANT CHANGE UP (ref 3.8–10.5)

## 2024-08-16 PROCEDURE — 72128 CT CHEST SPINE W/O DYE: CPT | Mod: 26,MC

## 2024-08-16 PROCEDURE — 72170 X-RAY EXAM OF PELVIS: CPT | Mod: 26

## 2024-08-16 PROCEDURE — 99232 SBSQ HOSP IP/OBS MODERATE 35: CPT

## 2024-08-16 PROCEDURE — 71045 X-RAY EXAM CHEST 1 VIEW: CPT | Mod: 26,76

## 2024-08-16 PROCEDURE — 72131 CT LUMBAR SPINE W/O DYE: CPT | Mod: 26,MC

## 2024-08-16 PROCEDURE — 71260 CT THORAX DX C+: CPT | Mod: 26,MC

## 2024-08-16 PROCEDURE — 93010 ELECTROCARDIOGRAM REPORT: CPT

## 2024-08-16 PROCEDURE — 99291 CRITICAL CARE FIRST HOUR: CPT

## 2024-08-16 PROCEDURE — 99232 SBSQ HOSP IP/OBS MODERATE 35: CPT | Mod: FS

## 2024-08-16 PROCEDURE — 93306 TTE W/DOPPLER COMPLETE: CPT | Mod: 26

## 2024-08-16 PROCEDURE — 72125 CT NECK SPINE W/O DYE: CPT | Mod: 26,MC

## 2024-08-16 PROCEDURE — 70450 CT HEAD/BRAIN W/O DYE: CPT | Mod: 26,MC

## 2024-08-16 PROCEDURE — 99222 1ST HOSP IP/OBS MODERATE 55: CPT

## 2024-08-16 PROCEDURE — 74177 CT ABD & PELVIS W/CONTRAST: CPT | Mod: 26,MC

## 2024-08-16 RX ORDER — ACETAMINOPHEN 325 MG/1
1000 TABLET ORAL ONCE
Refills: 0 | Status: COMPLETED | OUTPATIENT
Start: 2024-08-16 | End: 2024-08-16

## 2024-08-16 RX ORDER — LIDOCAINE HCL 20 MG/ML
10 VIAL (ML) INJECTION ONCE
Refills: 0 | Status: COMPLETED | OUTPATIENT
Start: 2024-08-16 | End: 2024-08-16

## 2024-08-16 RX ORDER — PROTHROMBIN COMPLEX CONCENTRATE (HUMAN) 25.5; 16.5; 24; 22; 22; 26 [IU]/ML; [IU]/ML; [IU]/ML; [IU]/ML; [IU]/ML; [IU]/ML
4500 POWDER, FOR SOLUTION INTRAVENOUS ONCE
Refills: 0 | Status: COMPLETED | OUTPATIENT
Start: 2024-08-16 | End: 2024-08-16

## 2024-08-16 RX ORDER — CHLORHEXIDINE GLUCONATE 40 MG/ML
1 SOLUTION TOPICAL DAILY
Refills: 0 | Status: DISCONTINUED | OUTPATIENT
Start: 2024-08-16 | End: 2024-08-20

## 2024-08-16 RX ORDER — FENTANYL CITRATE 50 UG/ML
50 INJECTION INTRAMUSCULAR; INTRAVENOUS ONCE
Refills: 0 | Status: DISCONTINUED | OUTPATIENT
Start: 2024-08-16 | End: 2024-08-16

## 2024-08-16 RX ORDER — HYDROMORPHONE HYDROCHLORIDE 2 MG/1
1 TABLET ORAL ONCE
Refills: 0 | Status: DISCONTINUED | OUTPATIENT
Start: 2024-08-16 | End: 2024-08-16

## 2024-08-16 RX ORDER — METOPROLOL TARTRATE 100 MG/1
5 TABLET ORAL EVERY 6 HOURS
Refills: 0 | Status: DISCONTINUED | OUTPATIENT
Start: 2024-08-16 | End: 2024-08-17

## 2024-08-16 RX ORDER — SODIUM CHLORIDE 9 MG/ML
500 INJECTION INTRAMUSCULAR; INTRAVENOUS; SUBCUTANEOUS ONCE
Refills: 0 | Status: COMPLETED | OUTPATIENT
Start: 2024-08-16 | End: 2024-08-16

## 2024-08-16 RX ADMIN — METOPROLOL TARTRATE 5 MILLIGRAM(S): 100 TABLET ORAL at 12:30

## 2024-08-16 RX ADMIN — FENTANYL CITRATE 50 MICROGRAM(S): 50 INJECTION INTRAMUSCULAR; INTRAVENOUS at 08:51

## 2024-08-16 RX ADMIN — ACETAMINOPHEN 1000 MILLIGRAM(S): 325 TABLET ORAL at 13:30

## 2024-08-16 RX ADMIN — CHLORHEXIDINE GLUCONATE 1 APPLICATION(S): 40 SOLUTION TOPICAL at 12:30

## 2024-08-16 RX ADMIN — Medication 4 MILLIGRAM(S): at 07:59

## 2024-08-16 RX ADMIN — PROTHROMBIN COMPLEX CONCENTRATE (HUMAN) 400 INTERNATIONAL UNIT(S): 25.5; 16.5; 24; 22; 22; 26 POWDER, FOR SOLUTION INTRAVENOUS at 12:52

## 2024-08-16 RX ADMIN — Medication 1000 MILLILITER(S): at 18:32

## 2024-08-16 RX ADMIN — SODIUM CHLORIDE 500 MILLILITER(S): 9 INJECTION INTRAMUSCULAR; INTRAVENOUS; SUBCUTANEOUS at 09:28

## 2024-08-16 RX ADMIN — HYDROMORPHONE HYDROCHLORIDE 1 MILLIGRAM(S): 2 TABLET ORAL at 10:16

## 2024-08-16 RX ADMIN — METOPROLOL TARTRATE 5 MILLIGRAM(S): 100 TABLET ORAL at 18:11

## 2024-08-16 RX ADMIN — ACETAMINOPHEN 400 MILLIGRAM(S): 325 TABLET ORAL at 12:30

## 2024-08-16 RX ADMIN — Medication 10 MILLILITER(S): at 12:37

## 2024-08-16 NOTE — PROGRESS NOTE ADULT - SUBJECTIVE AND OBJECTIVE BOX
89 yo male with fall on Eliquis and multiple fractures IR consulted for embolization of thoracic hematoma. Imaging reviewed, CTA demonstrates left extrapleural hematoma with small foci of contrast within hematoma. Followup chest xray demonstrates, layering left pleural effusion also seen on CTA. Patient bp stable, satting well on room air, tachycardic. Hgb with initial drop but now stable on serial CBC.    Plan:  Continue conservative management. Extrapleural hematoma likely will tamponade s/p AC reversal.  Patient s/p 1unit PRBC, followup H-H  Can consider repeat CTA if there is concern for hematoma expansion.  Please call IR if patient becomes unstable.

## 2024-08-16 NOTE — CONSULT NOTE ADULT - ASSESSMENT
This is a 89ym living in an assistant living facility with mild cognitive impairment, HTN, Afib on Xarelto    Pt has a pleural hematoma as a result of the the rib fx. Pos t-p fracture of the left side 7 thru 10th.   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   _pt has a pleural hematoma and therefore would recomm a mri and recom to reverse the xarelto.   -will discuss with Dr Swanson the findings.  This is a 89ym living in an assistant living facility with mild cognitive impairment, HTN, Afib on Xarelto    Pt has a pleural hematoma as a result of the rib fx. Pos t-p fracture of the left side 7 thru 10th.   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   _pt has a pleural hematoma and therefore would recomm reverse the Xarelto which has been done with [facility]  -mri of the thoracic and lumbar region recommended -not an absolute rule to  recomm with Transverse process fractures   -Dr Swanson reviewed films and exam with staff and recomm as above.

## 2024-08-16 NOTE — CONSULT NOTE ADULT - ASSESSMENT
89yMale with h/o AFib on Eliquis, HTN, BPH, mild cognitive impairment, presents to [facility] ED from [facility] s/p mechanical fall.  Pt was on floor for at least one hour before being found by staff.  On CT Chest with IV Contrast, pt with multifragmented, displaced posterior/medial left 8th-9th rib, left T7 - T10 transverse process fractures with associated large pleural hematomas with active extravasation/hemorrhage. Because of loculated/masslike configuration of lower LEFT thoracic hematomas, extrapleural hematomas, insinuated between lower LEFT parietal pleura and intercostal muscles is an alternate possibility.  Pt admitted to Trauma service.  Pt receiving KCentra 2/2 recent Eliquis.  Thoracic Surgery consulted for active extravasation seen on CT.

## 2024-08-16 NOTE — CONSULT NOTE ADULT - PROBLEM SELECTOR PROBLEM 1
Fracture of multiple ribs of left side Abdomen soft, non-tender and non-distended, no rebound, no guarding and no masses. no hepatosplenomegaly.

## 2024-08-16 NOTE — ED PROVIDER NOTE - OBJECTIVE STATEMENT
A 89 yo M with pmh Afib on Eliquis (last dose was yesterday), HTN, BIBEMS from [facility] for mechanical fall this morning. Pt reports he fell while going to use the bathroom, hit back and left side chest and abdomen, denies LOC or head strike. Pt is complaining for mid to lower back pain, L lateral rib pain, LLQ abdominal pain. Denies fevers, chills, headache, chest pain, palpitations, shortness of breath, cough, nausea, vomiting, or focal neurologic symptoms.

## 2024-08-16 NOTE — ED ADULT NURSE NOTE - OBJECTIVE STATEMENT
Assumed care of pt at 0739. Pt A&Ox4 c/o fall at the [facility]. pt is on eliquis, pt denies LOC. Pt denies nausea/ vomiting. Pt is in 10/10 abdominal pain in ED and guarding abdomen. CM initiated and maintained, Priority CT called. VS as charted

## 2024-08-16 NOTE — GOALS OF CARE CONVERSATION - ADVANCED CARE PLANNING - CONVERSATION DETAILS
Mr. Marco Antonio Call is a 89yM, admitted to the SICU with Left 8-9 rib fractures, T7-T10 TP fractures, and left pleural hematoma with active extravasation,      I spoke with patient's daughter, Amanda, over the phone, [phone], who was identified as the health care proxy, and we had an extensive conversation about Marco Antonio's care and current condition.     I obtained consent to discuss what their wishes would be, should Marco Antonio's condition worsen. I explained that because of the bleeding into his chest, Marco Antonio could become more hemodynamically unstable, and his respiratory status could deteriorate.    Discussed overall goals of care including advanced directives with Amanda. During this discussion we reviewed the current diagnosis, treatment plan, and likely prognosis. An explanation of advanced directives and MOLST was provided.    Amanda identifies comfort as one of the most important goals to Marco Antonio. She states that if his heart were to stop, he would not want  chest compressions and electricity to bring him back. She reported that if his respiratory status were to worsen, she would want intubation and mechanical ventilation to support his breathing, although he would not want prolonged intubation (>1 week) or tracheostomy.     After this conversation decision was made by Amanda to change status to DNR / trial of intubation. MOLST form completed, signed, and placed in chart.     Above was reviewed with SICU attending, Dr. Raymond.

## 2024-08-16 NOTE — PATIENT PROFILE ADULT - FALL HARM RISK - HARM RISK INTERVENTIONS

## 2024-08-16 NOTE — ED PROVIDER NOTE - CLINICAL SUMMARY MEDICAL DECISION MAKING FREE TEXT BOX
A 88 yo M with pmh Afib on Eliquis (last dose was yesterday), HTN, BIBEMS from [facility] for mechanical fall this morning. Pt reports he fell while going to use the bathroom, hit back and left side chest and abdomen, denies LOC or head strike. Pt is complaining for mid to lower back pain, L lateral rib pain, LLQ abdominal pain.   Will check CT pan scan including CTH, c-spine, CT c/ab/p with con, thoracic/lumber spine. Will check cbc, cmp, coags, trop T, vbg, cxr, xray pelvis, ECG. Given IVF. Symptom management and reassess. A 89 yo M with pmh Afib on Eliquis (last dose was yesterday), HTN, BIBEMS from [facility] for mechanical fall this morning. Pt reports he fell while going to use the bathroom, hit back and left side chest and abdomen, denies LOC or head strike. Pt is complaining for mid to lower back pain, L lateral rib pain, LLQ abdominal pain.   Will check CT pan scan including CTH, c-spine, CT c/ab/p with con, thoracic/lumber spine. Will check cbc, cmp, coags, trop T, vbg, cxr, xray pelvis, ECG. Given IVF. Symptom management and reassess.  ---------  89 yo M hx of Afib Eliquis, HTN, BIBEMS from assisted living for mechanical fall. Pt reports he fell while going to use the bathroom, hit back and left side chest and abdomen, denies LOC or head strike. Pt is complaining for mid to lower back pain, L lateral rib pain, LLQ abdominal pain. Denies fevers, chills, headache, chest pain, palpitations, shortness of breath, cough, nausea, vomiting, or focal neurologic symptoms.    Gen: Well appearing in NAD   Head: NC/AT  Neck: trachea midline  Resp:  No distress  Ext: no deformities  Neuro:  A&O appears non focal  Skin:  Warm and dry as visualized  Psych:  Normal affect and mood    ddx includes, but is not limited to the following:  plan:  update: see progress notes A 89 yo M with pmh Afib on Eliquis (last dose was yesterday), HTN, BIBEMS from [facility] for mechanical fall this morning. Pt reports he fell while going to use the bathroom, hit back and left side chest and abdomen, denies LOC or head strike. Pt is complaining for mid to lower back pain, L lateral rib pain, LLQ abdominal pain.   Will check CT pan scan including CTH, c-spine, CT c/ab/p with con, thoracic/lumber spine. Will check cbc, cmp, coags, trop T, vbg, cxr, xray pelvis, ECG. Given IVF. Symptom management and reassess.  ---------  89 yo M hx of Afib Eliquis, HTN, BIBEMS from assisted living for mechanical fall while using the bathroom. Pt denies head strike or LOC, was on the floor for ~60 mins before staff helped him up. Pt landed on back and left side chest and abdomen.    Gen: Well appearing in NAD   Head: NC/AT  Spine: No midline c spine ttp, + midline mid T spine ttp. no L spine ttp  Neck: trachea midline  Resp:  No distress, tender left lateral low rib.   abd: + LUQ, Left flank ttp, pelvis stable   Ext: no deformities  Neuro:  A&O appears non focal  Skin:  Warm and dry as visualized  Psych:  Normal affect and mood    ddx includes, but is not limited to the following: acute traumatic injury such as hemorrhage, acute fracture, anemia.   plan: CT pan scan with IV contrast, screening lab, pain control, trauma consult, admission. A 88 yo M with pmh Afib on Eliquis (last dose was yesterday), HTN, BIBEMS from [facility] for mechanical fall this morning. Pt reports he fell while going to use the bathroom, hit back and left side chest and abdomen, denies LOC or head strike. Pt is complaining for mid to lower back pain, L lateral rib pain, LLQ abdominal pain.   Will check CT pan scan including CTH, c-spine, CT c/ab/p with con, thoracic/lumber spine. Will check cbc, cmp, coags, trop T, vbg, cxr, xray pelvis, ECG. Given IVF. Symptom management and reassess.  ---------  Quiana Valentin MD, Attending  88 yo M hx of Afib Eliquis, HTN, BIBEMS from assisted living for mechanical fall while using the bathroom. Pt denies head strike or LOC, was on the floor for ~60 mins before staff helped him up. Pt landed on back and left side chest and abdomen.    Gen: Well appearing in NAD   Head: NC/AT  Spine: No midline c spine ttp, + midline mid T spine ttp. no L spine ttp  Neck: trachea midline  Resp:  No distress, tender left lateral low rib.   abd: + LUQ, Left flank ttp, pelvis stable   Ext: no deformities  Neuro:  A&O appears non focal  Skin:  Warm and dry as visualized  Psych:  Normal affect and mood    ddx includes, but is not limited to the following: acute traumatic injury such as hemorrhage, acute fracture, anemia from bleeding, hemodynamic instability.   plan: CT pan scan with IV contrast, screening lab, pain control, trauma consult, admission.

## 2024-08-16 NOTE — ED PROVIDER NOTE - NS ED MD TWO NIGHTS YN
HEMATOLOGY/ONCOLOGY PROGRESS NOTE    Diagnosis- Iron deficiency anemia    Subjective-patient states that her energy levels improved after receiving IV iron infusion last time.  She is to continues to have menorrhagia.  She had ablation but did not decrease bleeding significantly.  She is not taking oral hormone pills due to history of increased stroke in the family.      HISTORY OF PRESENT ILLNESS:  42 year old female with past history of polycystic ovarian syndrome.  Patient states she started to have increased weakness and fatigue starting earlier this year.  Also complains other nonspecific symptoms including intermittent headaches, shortness of breath on exertion, bony pains.  She also states these symptoms have caused her to feel depressed and she is currently taking low dose lexapro. She has complaints of heavy menstrual bleeding which is more for the first few days of her cycle. She denies any bleeding in the stools. She recently had upper EGD for symptoms of GERD and found to have esophagitis ?eosinophilic and being treated with PPI. She denies smoking, recently stopped using alcohol. Family history of MDS in father. Works as a .     REVIEW OF SYSTEMS:    All systems reviewed and are negative with the except of the findings noted in the HPI.    MEDICATIONS AND ALLERGIES:    Medications and allergies were reviewed and updated.    HISTORIES:    No past medical history on file.   No past surgical history on file.   (Not in a hospital admission)    ALLERGIES:  No Known Allergies   Social History     Tobacco Use   • Smoking status: Former Smoker     Packs/day: 0.00     Quit date: 3/26/2010     Years since quitting: 10.8   • Smokeless tobacco: Never Used   Substance Use Topics   • Alcohol use: Yes     Frequency: Monthly or less      No family history on file.       OBJECTIVE:      Vital Signs:   Oncology Encounter Vitals [02/01/21 1540]   ONC OP Encounter Vitals Group      /72      Heart Rate  74      Resp 16      Temp 97.3 °F (36.3 °C)      Temp src       SpO2       Weight 190 lb (86.2 kg)      Height 5' 3.75\" (1.619 m)      Pain Score  0      Pain Location       Pain Education?       BSA (Calculated - m2) - Kayla Garza 1.91      BMI (Calculated) 32.87     ECOG Performance Status:   ECOG   ECOG Performance Status      PHYSICAL EXAMINATION:    General:  No acute distress.  Skin:  Skin turgor normal.  No rash.   Head:  Normocephalic, without obvious abnormality,.  Eyes:  Conjunctiva clear, No icterus,    Throat:  Mucous membranes normal.   Neck: Supple. No thyromegaly.  No enlarged cervical, supraclavicular or infraclavicular lymphadenopathy.  Lungs:   Clear. No rhonchi, wheezing or rales.  Symmetrical breath sounds.   Heart:  Regular rate and rhythm. S1 and S2 normal. No murmur, rub or gallop. No peripheral edema  Abdomen:  Soft, non-tender, bowel sounds normal.  No masses. No hepatomegaly.  No splenomegaly.  Extremities:   Normal, No cyanosis or edema.  No petechiae or ecchymosis, No signs of a DVT  Lymph Nodes:  Cervical, supraclavicular or axillary nodes normal.  Musculoskeletal: Symmetrical strength.  Neurologic:  Alert and oriented x 3.  Normal affect.  No focal motor sensory defects.  Cooperative.    Recent PHQ 2/9 Score    PHQ 2:  Date Adult PHQ 2 Score Adult PHQ 2 Interpretation   2/1/2021 0 No further screening needed       PHQ 9:  Date Adult PHQ 9 Score Adult PHQ 9 Interpretation   10/26/2020 13 Moderate Depression       LABORATORY DATA:  Recent Labs   Lab 01/27/21  0710   WBC 7.2   RBC 4.80   HGB 14.7   HCT 43.2   MCV 90.0      Absolute Neutrophils 4.4   Absolute Lymphocytes 2.2   Absolute Monocytes 0.5   Absolute Eosinophils  0.2   Absolute Basophils 0.0     Recent Labs   Lab 01/27/21  0700   Glucose 114*   Sodium 139   Potassium 4.0   Chloride 106   Carbon Dioxide 28   BUN 10   Creatinine 0.75   Calcium 9.0   Protein, Total 7.0   Albumin 3.8   GOT/AST 17   Alkaline Phosphatase 89    GPT 28     Recent Labs   Lab 01/27/21  0700 10/26/20  1646   Anion Gap 9*  --    Globulin 3.2  --    LDH  --  170   Bilirubin, Total 1.0  --          IMAGING STUDIES:    Imaging studies reviewed. The pertinent positives are   No orders to display       PROBLEM LIST:  Patient Active Problem List   Diagnosis   • Iron deficiency anemia due to chronic blood loss          IMPRESSION / PLAN:      # Microcytic anemia due to iron deficiency  -Recent labs showing Hgb of 9.1 and MCV 71. Wbc and platelets normal. Previous iron labs in 2018 showing ferritin 16. History of menorrhagia. Recent upper EGD showing gastritis.   -Patient has been taking oral iron for last few weeks but unable to tolerate with increase complaints of heart burn and constipation.  -labs showing normal B12, folate, LDH. Ferritin level 8  -Received 2 doses of Injectafer.  Improvement in hemoglobin to 14. Repeat ferritin 76. Patient continues to have menorrhagia and complaints of mild weakness. I recommend to follow up in 2 months to monitor cbc and iron labs.     Chon Romero MD       Yes

## 2024-08-16 NOTE — ED PROVIDER NOTE - CARE PLAN
Principal Discharge DX:	Pleural hemorrhage  Secondary Diagnosis:	Fracture of transverse process of thoracic vertebra   1

## 2024-08-16 NOTE — ED ADULT TRIAGE NOTE - CHIEF COMPLAINT QUOTE
Pt BIBEMS from [facility] s/p trip and fall while going to use the bathroom. Pt denies hitting head and denies LOC, but endorses intense lower back pain denying radiation. Pt on Eliquis d/t AFib.

## 2024-08-16 NOTE — H&P ADULT - ATTENDING COMMENTS
Patient to be Admitted to SICU for further monitoring    N: multimodal pain control, neurospine consult for multiple vertebral fractures  R: continue 2L nasal canula, thoracic surgery consult for pleural hematoma  C: Afib on eliquis, hold eliquis for now, continue lopressor  Gi: NPO   Gu: place jarrell for urine monitoring  H: trend H/H, hold eliquis, will reverse with KCentra  ID: no abx  Endo: glycemic control

## 2024-08-16 NOTE — H&P ADULT - NSHPPHYSICALEXAM_GEN_ALL_CORE
T(C): 36.7 (08-16-24 @ 07:02), Max: 36.7 (08-16-24 @ 07:02)  HR: 113 (08-16-24 @ 08:32) (107 - 113)  BP: 159/93 (08-16-24 @ 08:32) (156/84 - 159/93)  RR: 18 (08-16-24 @ 08:32) (18 - 18)  SpO2: 99% (08-16-24 @ 08:32) (98% - 99%)      PHYSICAL EXAM:   General: NAD, lying in bed  Neuro: A+O, states he remembers what happened. GCS 14 (M6, V5, E3)  HEENT: extraocular eye movements grossly intact, MMM. No abrasions or lacerations of the scalp, no facial tenderness. Small L cheek ecchymosis, likely dependent.  Cardio: tachycardic in AFib on monitor  Resp: Non labored breathing on RA, intermittently desatting to the low 90s. Decreased breath sounds over L lung base  GI/Abd: Soft, NT/ND, no rebound/guarding, suprapubic fullness  Musculoskeletal: All 4 extremities moving spontaneously, no limitations. L chest wall tenderness, no sternal or R chest wall tenderness. Pelvis stable. No c/t/l-spine tenderness, no stepoffs or deformities. Some bruising over lower thoracic spine. No gross deformities of the 4 extremities.

## 2024-08-16 NOTE — ED ADULT NURSE NOTE - NSFALLHARMRISKINTERV_ED_ALL_ED

## 2024-08-16 NOTE — ED ADULT NURSE REASSESSMENT NOTE - NS ED NURSE REASSESS COMMENT FT1
pt moved to  For continuous monitoring. Report given to EMIGDIO Kelly. CM continued. Trauma at bedside with pt.

## 2024-08-16 NOTE — CONSULT NOTE ADULT - SUBJECTIVE AND OBJECTIVE BOX
SUBJECTIVE    HPI 89yMale with h/o AFib on Eliquis, HTN, BPH, mild cognitive impairment, presents to [facility] ED from [facility] s/p mechanical fall.  Pt was on floor for at least one hour before being found by staff.  On CT Chest with IV Contrast, pt with multifragmented, displaced posterior/medial left 8th-9th rib, left T7 - T10 transverse process fractures with associated large pleural hematomas with active extravasation/hemorrhage. Because of loculated/masslike configuration of lower LEFT thoracic hematomas, extrapleural hematomas, insinuated between lower LEFT parietal pleura and intercostal muscles is an alternate possibility.  Pt admitted to Trauma service.  Pt receiving KCentra 2/2 recent Eliquis.  Thoracic Surgery consulted for active extravasation seen on CT.      LAST BLOOD THINNER--> prothrombin complex concentrate IVPB (KCENTRA)   400 mL/Hr IV Intermittent (08-16-24 @ 12:52)        SOCIAL HISTORY   patient lives in [facility]; stairs; assist devices - cane  smoking history   drinking history         PAST MEDICAL & SURGICAL HISTORY:  HTN (hypertension)    Afib    Chronic constipation    Insomnia    Hyperlipidemia    Anxiety and depression    Mild cognitive impairment    No significant past surgical history        Home Medications:  amLODIPine 5 mg oral tablet: 1 tab(s) orally once a day (16 Aug 2024 11:13)  Colace 100 mg oral capsule: 2 cap(s) orally once a day (at bedtime) (16 Aug 2024 11:13)  Eliquis 5 mg oral tablet: 1 tab(s) orally 2 times a day (16 Aug 2024 11:13)  furosemide 20 mg oral tablet: 1 tab(s) orally once a day (16 Aug 2024 11:13)  Melatonin 3 mg oral tablet: 1 tab(s) orally once a day (at bedtime) (16 Aug 2024 11:13)  metoprolol tartrate 50 mg oral tablet: 1 tab(s) orally 2 times a day (16 Aug 2024 11:13)  Polyethylene Glycol 3350: 17 gram(s) orally once a day (16 Aug 2024 11:13)  pravastatin 80 mg oral tablet: 1 tab(s) orally once a day (16 Aug 2024 11:13)  ramipril 10 mg oral tablet: orally once a day (16 Aug 2024 11:13)  senna (sennosides) 8.6 mg oral tablet: 1 tab(s) orally once a day (16 Aug 2024 11:13)  venlafaxine 37.5 mg oral tablet: 1 tab(s) orally once a day (16 Aug 2024 11:13)        Allergies    No Known Allergies    Intolerances        OBJECTIVE DATA    VITALS   currently in sinus rhythm  ICU Vital Signs Last 24 Hrs  T(C): 36.3 (16 Aug 2024 12:56), Max: 36.7 (16 Aug 2024 07:02)  T(F): 97.4 (16 Aug 2024 12:56), Max: 98 (16 Aug 2024 07:02)  HR: 109 (16 Aug 2024 13:20) (105 - 113)  BP: 108/89 (16 Aug 2024 13:20) (103/51 - 159/93)  BP(mean): 76 (16 Aug 2024 13:20) (68 - 82)  ABP: --  ABP(mean): --  RR: 13 (16 Aug 2024 13:20) (13 - 19)  SpO2: 100% (16 Aug 2024 13:20) (97% - 100%)    O2 Parameters below as of 16 Aug 2024 08:32  Patient On (Oxygen Delivery Method): room air      LABS                         10.4   11.78 )-----------( 121      ( 16 Aug 2024 12:10 )             31.8   PT/INR - ( 16 Aug 2024 08:02 )   PT: 15.5 sec;   INR: 1.41 ratio      PTT - ( 16 Aug 2024 08:02 )  PTT:29.0 [id number]      141  |  103  |  22.6<H>  ----------------------------<  139<H>  3.6   |  22.0  |  1.00    Ca    8.9      16 Aug 2024 08:02    TPro  6.5<L>  /  Alb  3.9  /  TBili  0.9  /  DBili  x   /  AST  26  /  ALT  13  /  AlkPhos  106  08-16    ABG - ( 16 Aug 2024 12:23 )  pH, Arterial: 7.340 pH, Blood: x     /  pCO2: 34    /  pO2: 89    / HCO3: 18    / Base Excess: -7.5  /  SaO2: 99.2        I&O's Summary    16 Aug 2024 07:01  -  16 Aug 2024 15:19  --------------------------------------------------------  IN: 0 mL / OUT: 940 mL / NET: -940 mL        Physical Exam          IMAGING   personally reviewed imaging       CT Chest with IV Contrast 8/16/24    < from: CT Chest w/ IV Cont (08.16.24 @ 09:11) >    *** ADDENDUM # 1 ***    Addendum:    Because of loculated/masslike configuration of lower LEFT thoracic   hematomas, extrapleural hematomas,   insinuated between lower LEFT   parietal pleura and intercostal muscles is an alternate possibility.   Discussed with KATHRYN Jurado MD at 8/16/2024 10:57 AM.    --- End of Report ---    < end of copied text >  < from: CT Chest w/ IV Cont (08.16.24 @ 09:11) >    FINDINGS:  CHEST:  LUNGS AND LARGE AIRWAYS: Patent central airways. No segmental airspace   opacities or suspicious pulmonary lesions.  PLEURA: 8.5 x 4 x 14.2cm (TR x AP x CC) pleural-based posteriolateral   LEFT CP angle and adjacent smaller LEFT para-T8-9 masses,  most c/w   pleural hematomas, with internal contrast extravasation, developed from   7/13/2024. Adjacent multifragmented/displaced posterior/medial LEFT   8th-9th rib fractures and LEFT T7, T8, T9, T10 transverse process   fractures. Small dependent pleural effusions/hemothoraces and adjacent   lower lobe passive atelectasis.. No pneumothorax.  VESSELS: No aortic dilatation. Aortic /coronary artery calcification.  HEART: Cardiomegaly No pericardial effusion.  MEDIASTINUM AND SYLVIA: No lymphadenopathy.  CHEST WALL AND LOWER NECK: 15 mm LEFT thyroid nodule with coarse   calcification. Gynecomastia    ABDOMEN AND PELVIS:  LIVER: Within normal limits.  BILE DUCTS: Normal caliber.  GALLBLADDER: Within normal limits.  SPLEEN: Within normal limits.  PANCREAS: Atrophic change. Pancreatic body and tail cysts (3/301, 110)   measure up to 1 cm. 1.2cm pancreatic head cyst with trace calcification   (3/114, 15)  ADRENALS: Within normal limits.  KIDNEYS/URETERS: Symmetric renal enhancement without calculi/ obstructive   uropathy. Too small to characterize renal hypodensities.    Centimeter-sized exophytic RIGHT upper pole renal lesion (5-61) favors    hyperdense cyst but  of indeterminate etiology    BLADDER: Distended urinary bladder  REPRODUCTIVE ORGANS: Enlarged prostate    BOWEL: No bowel obstruction. Normal appendix. No diverticulitis.  PERITONEUM/RETROPERITONEUM: No ascites or pneumoperitoneum. Dependent   presacral edema  VESSELS: Atherosclerotic changes.  LYMPH NODES: No lymphadenopathy.  ABDOMINAL WALL: Prominent spermatic cord fat or small fat-containing   inguinal hernias.  BONES: Multifragmented, displaced posterior/medial LEFT 8th-9th rib, LEFT   T7, T8, T9, T10 transverse process fractures. Diffuse lower   cervical/thoracic diffuse idiopathic skeletal hyperostosis. Mild lumbar   degenerative changes.    IMPRESSION:  1.  Multifragmented, displaced posterior/medial LEFT 8th-9th rib, LEFT   T7, T8, T9, T10 transverse process fractures with associated large   pleural hematomas with active extravasation/hemorrhage  2.  Multiple pancreatic cysts and indeterminant RIGHT upper pole renal   lesion. Correlation with comorbidities may determine need for follow-up   and/or primary evaluation        < end of copied text >         SUBJECTIVE    HPI 89yMale with h/o AFib on Eliquis, HTN, BPH, mild cognitive impairment, presents to [facility] ED from [facility] s/p mechanical fall.  Pt was on floor for at least one hour before being found by staff.  On CT Chest with IV Contrast, pt with multifragmented, displaced posterior/medial left 8th-9th rib, left T7 - T10 transverse process fractures with associated large pleural hematomas with active extravasation/hemorrhage. Because of loculated/masslike configuration of lower LEFT thoracic hematomas, extrapleural hematomas, insinuated between lower LEFT parietal pleura and intercostal muscles is an alternate possibility.  Pt admitted to Trauma service.  Pt receiving KCentra 2/2 recent Eliquis.  Thoracic Surgery consulted for active extravasation seen on CT.    Pt seen and assessed at bedside.  Pt laying comfortably in hospital bed in NAD on 2L NC.  States no current physical complaints at this time.  Denies f/c, n/v, chest pain, sob, abdominal pain, d/c, urinary symptoms.  ROS negative x 10 except as indicated in HPI.      LAST BLOOD THINNER--> prothrombin complex concentrate IVPB (KCENTRA)   400 mL/Hr IV Intermittent (08-16-24 @ 12:52)        SOCIAL HISTORY   patient lives in [facility]; stairs; assist devices - cane  smoking history - denies  drinking history - denies        PAST MEDICAL & SURGICAL HISTORY:  HTN (hypertension)    Afib    Chronic constipation    Insomnia    Hyperlipidemia    Anxiety and depression    Mild cognitive impairment    No significant past surgical history        Home Medications:  amLODIPine 5 mg oral tablet: 1 tab(s) orally once a day (16 Aug 2024 11:13)  Colace 100 mg oral capsule: 2 cap(s) orally once a day (at bedtime) (16 Aug 2024 11:13)  Eliquis 5 mg oral tablet: 1 tab(s) orally 2 times a day (16 Aug 2024 11:13)  furosemide 20 mg oral tablet: 1 tab(s) orally once a day (16 Aug 2024 11:13)  Melatonin 3 mg oral tablet: 1 tab(s) orally once a day (at bedtime) (16 Aug 2024 11:13)  metoprolol tartrate 50 mg oral tablet: 1 tab(s) orally 2 times a day (16 Aug 2024 11:13)  Polyethylene Glycol 3350: 17 gram(s) orally once a day (16 Aug 2024 11:13)  pravastatin 80 mg oral tablet: 1 tab(s) orally once a day (16 Aug 2024 11:13)  ramipril 10 mg oral tablet: orally once a day (16 Aug 2024 11:13)  senna (sennosides) 8.6 mg oral tablet: 1 tab(s) orally once a day (16 Aug 2024 11:13)  venlafaxine 37.5 mg oral tablet: 1 tab(s) orally once a day (16 Aug 2024 11:13)        Allergies    No Known Allergies    Intolerances        OBJECTIVE DATA    VITALS   currently in sinus rhythm  ICU Vital Signs Last 24 Hrs  T(C): 36.3 (16 Aug 2024 12:56), Max: 36.7 (16 Aug 2024 07:02)  T(F): 97.4 (16 Aug 2024 12:56), Max: 98 (16 Aug 2024 07:02)  HR: 109 (16 Aug 2024 13:20) (105 - 113)  BP: 108/89 (16 Aug 2024 13:20) (103/51 - 159/93)  BP(mean): 76 (16 Aug 2024 13:20) (68 - 82)  ABP: --  ABP(mean): --  RR: 13 (16 Aug 2024 13:20) (13 - 19)  SpO2: 100% (16 Aug 2024 13:20) (97% - 100%)    O2 Parameters below as of 16 Aug 2024 08:32  Patient On (Oxygen Delivery Method): room air      LABS                         10.4   11.78 )-----------( 121      ( 16 Aug 2024 12:10 )             31.8   PT/INR - ( 16 Aug 2024 08:02 )   PT: 15.5 sec;   INR: 1.41 ratio      PTT - ( 16 Aug 2024 08:02 )  PTT:29.0 [id number]      141  |  103  |  22.6<H>  ----------------------------<  139<H>  3.6   |  22.0  |  1.00    Ca    8.9      16 Aug 2024 08:02    TPro  6.5<L>  /  Alb  3.9  /  TBili  0.9  /  DBili  x   /  AST  26  /  ALT  13  /  AlkPhos  106  08-16    ABG - ( 16 Aug 2024 12:23 )  pH, Arterial: 7.340 pH, Blood: x     /  pCO2: 34    /  pO2: 89    / HCO3: 18    / Base Excess: -7.5  /  SaO2: 99.2        I&O's Summary    16 Aug 2024 07:01  -  16 Aug 2024 15:19  --------------------------------------------------------  IN: 0 mL / OUT: 940 mL / NET: -940 mL        Physical Exam  Neuro: A+O x 3, non-focal, speech clear and intact  HEENT: PERRL, EOMI, oral mucosa pink and moist  Neck: supple, no JVD  CV: irregularly irregular, +S1S2, no murmurs or rub  Pulm/chest: lung sounds CTA and equal bilaterally, no accessory muscle use noted, on 2L NC  Abd: soft, NT, ND, +BS  Ext: DOYLE x 4, no C/C/E  Skin: warm, well perfused, no rashes        IMAGING   personally reviewed imaging       CT Chest with IV Contrast 8/16/24    < from: CT Chest w/ IV Cont (08.16.24 @ 09:11) >    *** ADDENDUM # 1 ***    Addendum:    Because of loculated/masslike configuration of lower LEFT thoracic   hematomas, extrapleural hematomas,   insinuated between lower LEFT   parietal pleura and intercostal muscles is an alternate possibility.   Discussed with KATHRYN Jurado MD at 8/16/2024 10:57 AM.    --- End of Report ---    < end of copied text >  < from: CT Chest w/ IV Cont (08.16.24 @ 09:11) >    FINDINGS:  CHEST:  LUNGS AND LARGE AIRWAYS: Patent central airways. No segmental airspace   opacities or suspicious pulmonary lesions.  PLEURA: 8.5 x 4 x 14.2cm (TR x AP x CC) pleural-based posteriolateral   LEFT CP angle and adjacent smaller LEFT para-T8-9 masses,  most c/w   pleural hematomas, with internal contrast extravasation, developed from   7/13/2024. Adjacent multifragmented/displaced posterior/medial LEFT   8th-9th rib fractures and LEFT T7, T8, T9, T10 transverse process   fractures. Small dependent pleural effusions/hemothoraces and adjacent   lower lobe passive atelectasis.. No pneumothorax.  VESSELS: No aortic dilatation. Aortic /coronary artery calcification.  HEART: Cardiomegaly No pericardial effusion.  MEDIASTINUM AND SYLVIA: No lymphadenopathy.  CHEST WALL AND LOWER NECK: 15 mm LEFT thyroid nodule with coarse   calcification. Gynecomastia    ABDOMEN AND PELVIS:  LIVER: Within normal limits.  BILE DUCTS: Normal caliber.  GALLBLADDER: Within normal limits.  SPLEEN: Within normal limits.  PANCREAS: Atrophic change. Pancreatic body and tail cysts (3/301, 110)   measure up to 1 cm. 1.2cm pancreatic head cyst with trace calcification   (3/114, 15)  ADRENALS: Within normal limits.  KIDNEYS/URETERS: Symmetric renal enhancement without calculi/ obstructive   uropathy. Too small to characterize renal hypodensities.    Centimeter-sized exophytic RIGHT upper pole renal lesion (5-61) favors    hyperdense cyst but  of indeterminate etiology    BLADDER: Distended urinary bladder  REPRODUCTIVE ORGANS: Enlarged prostate    BOWEL: No bowel obstruction. Normal appendix. No diverticulitis.  PERITONEUM/RETROPERITONEUM: No ascites or pneumoperitoneum. Dependent   presacral edema  VESSELS: Atherosclerotic changes.  LYMPH NODES: No lymphadenopathy.  ABDOMINAL WALL: Prominent spermatic cord fat or small fat-containing   inguinal hernias.  BONES: Multifragmented, displaced posterior/medial LEFT 8th-9th rib, LEFT   T7, T8, T9, T10 transverse process fractures. Diffuse lower   cervical/thoracic diffuse idiopathic skeletal hyperostosis. Mild lumbar   degenerative changes.    IMPRESSION:  1.  Multifragmented, displaced posterior/medial LEFT 8th-9th rib, LEFT   T7, T8, T9, T10 transverse process fractures with associated large   pleural hematomas with active extravasation/hemorrhage  2.  Multiple pancreatic cysts and indeterminant RIGHT upper pole renal   lesion. Correlation with comorbidities may determine need for follow-up   and/or primary evaluation        < end of copied text >

## 2024-08-16 NOTE — H&P ADULT - HISTORY OF PRESENT ILLNESS
89M PMH AFib on Eliquis, HTN, BPH, mild cognitive impairment, presents as a fall. He fell earlier this morning in his assisted living facility and was on the floor for at least an hour (true time unknown) before an attendant found him. He was brought in for evaluation. He states he sometimes walks with a cane at home, thinks he was using it but 'slipped and fell'. Reports pain in back and ribs. Denies HS. Trauma surgery consulted, no trauma code activated.    In ED was tachycardic to the 120s, SBP was 160 and he was saturating well on RA. Eliquis and metoprolol last taken 6pm 8/15 (evening prior to presentation). CT scan demonstrated L 8th, 9th rib fractures and L T7-10 TP fractures, as well as a L pleural hematoma with active extravasation. He received 1.5L fluids in the ED.

## 2024-08-16 NOTE — H&P ADULT - NSHPLABSRESULTS_GEN_ALL_CORE
LABS                 10.7   11.37  )----------(  127       ( 16 Aug 2024 10:20 )               32.4      141    |  103    |  22.6   ----------------------------<  139        ( 16 Aug 2024 08:02 )  3.6     |  22.0   |  1.00     Ca    8.9        ( 16 Aug 2024 08:02 )    TPro  6.5    /  Alb  3.9    /  TBili  0.9    /  DBili  x      /  AST  26     /  ALT  13     /  AlkPhos  106    ( 16 Aug 2024 08:02 )    LIVER FUNCTIONS - ( 16 Aug 2024 08:02 )  Alb: 3.9 g/dL / Pro: 6.5 g/dL / ALK PHOS: 106 U/L / ALT: 13 U/L / AST: 26 U/L / GGT: x           PT/INR -  15.5 sec / 1.41 ratio   ( 16 Aug 2024 08:02 )       PTT -  29.0 sec   ( 16 Aug 2024 08:02 )  CAPILLARY BLOOD GLUCOSE    10:13 - VBG - pH:       | pCO2:       | pO2:       | Lactate: 2.60   08:09 - VBG - pH: 7.380 | pCO2: 40    | pO2: 60    | Lactate: 2.40     --------------------------------------------------------------------------------------------  IMAGING  EXAM: CT ABDOMEN AND PELVIS IC ORDERED BY: DC VINSON  EXAM: CT CHEST IC ORDERED BY: DC VINSON    *** ADDENDUM # 1 ***    Addendum:    Because of loculated/masslike configuration of lower LEFT thoracic hematomas, extrapleural hematomas, insinuated between lower LEFT parietal pleura and intercostal muscles is an alternate possibility. Discussed with KATHRYN Jurado MD at 8/16/2024 10:57 AM.    --- End of Report ---    *** END OF ADDENDUM # 1 ***      PROCEDURE DATE: 08/16/2024    INTERPRETATION: CLINICAL INFORMATION: Trauma. Rib pain, LLQ pain    COMPARISON: Lumbar CT 7/13/2024    FINDINGS:  CHEST:  LUNGS AND LARGE AIRWAYS: Patent central airways. No segmental airspace opacities or suspicious pulmonary lesions.  PLEURA: 8.5 x 4 x 14.2cm (TR x AP x CC) pleural-based posteriolateral LEFT CP angle and adjacent smaller LEFT para-T8-9 masses, most c/w pleural hematomas, with internal contrast extravasation, developed from 7/13/2024. Adjacent multifragmented/displaced posterior/medial LEFT 8th-9th rib fractures and LEFT T7, T8, T9, T10 transverse process fractures. Small dependent pleural effusions/hemothoraces and adjacent lower lobe passive atelectasis.. No pneumothorax.  VESSELS: No aortic dilatation. Aortic /coronary artery calcification.  HEART: Cardiomegaly No pericardial effusion.  MEDIASTINUM AND SYLVIA: No lymphadenopathy.  CHEST WALL AND LOWER NECK: 15 mm LEFT thyroid nodule with coarse calcification. Gynecomastia    ABDOMEN AND PELVIS:  LIVER: Within normal limits.  BILE DUCTS: Normal caliber.  GALLBLADDER: Within normal limits.  SPLEEN: Within normal limits.  PANCREAS: Atrophic change. Pancreatic body and tail cysts (3/301, 110) measure up to 1 cm. 1.2cm pancreatic head cyst with trace calcification (3/114, 15)  ADRENALS: Within normal limits.  KIDNEYS/URETERS: Symmetric renal enhancement without calculi/ obstructive uropathy. Too small to characterize renal hypodensities. Centimeter-sized exophytic RIGHT upper pole renal lesion (5-61) favors hyperdense cyst but of indeterminate etiology    BLADDER: Distended urinary bladder  REPRODUCTIVE ORGANS: Enlarged prostate    BOWEL: No bowel obstruction. Normal appendix. No diverticulitis.  PERITONEUM/RETROPERITONEUM: No ascites or pneumoperitoneum. Dependent presacral edema  VESSELS: Atherosclerotic changes.  LYMPH NODES: No lymphadenopathy.  ABDOMINAL WALL: Prominent spermatic cord fat or small fat-containing inguinal hernias.  BONES: Multifragmented, displaced posterior/medial LEFT 8th-9th rib, LEFT T7, T8, T9, T10 transverse process fractures. Diffuse lower cervical/thoracic diffuse idiopathic skeletal hyperostosis. Mild lumbar degenerative changes.    IMPRESSION:  1. Multifragmented, displaced posterior/medial LEFT 8th-9th rib, LEFT T7, T8, T9, T10 transverse process fractures with associated large pleural hematomas with active extravasation/hemorrhage  2. Multiple pancreatic cysts and indeterminant RIGHT upper pole renal lesion. Correlation with comorbidities may determine need for follow-up and/or primary evaluation

## 2024-08-16 NOTE — CONSULT NOTE ADULT - SUBJECTIVE AND OBJECTIVE BOX
HPI:  89M PMH AFib on Eliquis, HTN, BPH, mild cognitive impairment, presents as a fall. He fell earlier this morning in his assisted living facility and was on the floor for at least an hour (true time unknown) before an attendant found him. He was brought in for evaluation. He states he sometimes walks with a cane at home, thinks he was using it but 'slipped and fell'. Reports pain in back and ribs. Denies HS. Trauma surgery consulted, no trauma code activated.    In ED was tachycardic to the 120s, SBP was 160 and he was saturating well on RA. Eliquis and metoprolol last taken 6pm 8/15 (evening prior to presentation). CT scan demonstrated L 8th, 9th rib fractures and L T7-10 TP fractures, as well as a L pleural hematoma with active extravasation. He received 1.5L fluids in the ED.  (16 Aug 2024 11:47)    PAST MEDICAL & SURGICAL HISTORY:  HTN (hypertension)  Afib  Chronic constipation  Insomnia  Hyperlipidemia  Anxiety and depression  Mild cognitive impairment  No significant past surgical history          REVIEW OF SYSTEMS:    CONSTITUTIONAL: No fever, weight loss, or fatigue  EYES: No eye pain, visual disturbances, or discharge  ENMT:  No difficulty hearing, tinnitus, vertigo; No sinus or throat pain  NECK: No pain or stiffness  BREASTS: No pain, masses, or nipple discharge  RESPIRATORY: No cough, wheezing, chills or hemoptysis; No shortness of breath  CARDIOVASCULAR: No chest pain, palpitations, dizziness, or leg swelling  GASTROINTESTINAL: No abdominal or epigastric pain. No nausea, vomiting, or hematemesis; No diarrhea or constipation. No melena or hematochezia.  GENITOURINARY: No dysuria, frequency, hematuria, or incontinence  NEUROLOGICAL: No headaches, memory loss, loss of strength, numbness, or tremors  SKIN: No itching, burning, rashes, or lesions   LYMPH NODES: No enlarged glands  ENDOCRINE: No heat or cold intolerance; No hair loss  MUSCULOSKELETAL: No joint pain or swelling; No muscle, back, or extremity pain  PSYCHIATRIC: No depression, anxiety, mood swings, or difficulty sleeping  HEME/LYMPH: No easy bruising, or bleeding gums  ALLERY AND IMMUNOLOGIC: No hives or eczema    Allergies  No Knon Allergies  Intolerances      MEDICATIONS  (STANDING):  chlorhexidine 2% Cloths 1 Application(s) Topical daily  metoprolol tartrate Injectable 5 milliGRAM(s) IV Push every 6 hours  prothrombin complex concentrate IVPB (KCENTRA) 4500 International Unit(s) IV Intermittent once    MEDICATIONS  (PRN):    SOCIAL HISTORY:  FAMILY HISTORY:  No pertinent family history in first degree relatives      Vital Signs Last 24 Hrs  T(C): 36.7 (16 Aug 2024 07:02), Max: 36.7 (16 Aug 2024 07:02)  T(F): 98 (16 Aug 2024 07:02), Max: 98 (16 Aug 2024 07:02)  HR: 108 (16 Aug 2024 12:00) (107 - 113)  BP: 105/64 (16 Aug 2024 12:00) (105/64 - 159/93)  BP(mean): 75 (16 Aug 2024 12:00) (75 - 82)  RR: 19 (16 Aug 2024 12:00) (18 - 19)  SpO2: 100% (16 Aug 2024 12:00) (97% - 100%)    Parameters below as of 16 Aug 2024 08:32  Patient On (Oxygen Delivery Method): room air        PHYSICAL EXAM:    GENERAL: NAD,   HEAD:  Atraumatic, Normocephalic  EYES: EOMI, PERRLA, conjunctiva and sclera clear  ENMT: No tonsillar erythema, exudates, or enlargement; Moist mucous membranes, Good dentition, No lesions  NECK: Supple, No JVD, Normal thyroid  NERVOUS SYSTEM:  Alert & Oriented X3, Good concentration; Motor Strength 5/5 B/L upper and lower extremities; DTRs 2+ intact and symmetric  CHEST/LUNG: Clear to percussion bilaterally; No rales, rhonchi, wheezing, or rubs  HEART: Regular rate and rhythm; No murmurs, rubs, or gallops  ABDOMEN: Soft, Nontender, Nondistended; Bowel sounds present  EXTREMITIES:  2+ Peripheral Pulses, No clubbing, cyanosis, or edema  LYMPH: No lymphadenopathy noted  SKIN: No rashes or lesions    LABS:                        10.4   11.78 )-----------( x        ( 16 Aug 2024 12:10 )             31.8     08-16    141  |  103  |  22.6<H>  ----------------------------<  139<H>  3.6   |  22.0  |  1.00    Ca    8.9      16 Aug 2024 08:02    TPro  6.5<L>  /  Alb  3.9  /  TBili  0.9  /  DBili  x   /  AST  26  /  ALT  13  /  AlkPhos  106  08-16    PT/INR - ( 16 Aug 2024 08:02 )   PT: 15.5 sec;   INR: 1.41 ratio         PTT - ( 16 Aug 2024 08:02 )  PTT:29.0 sec  Urinalysis Basic - ( 16 Aug 2024 08:02 )    Color: x / Appearance: x / SG: x / pH: x  Gluc: 139 mg/dL / Ketone: x  / Bili: x / Urobili: x   Blood: x / Protein: x / Nitrite: x   Leuk Esterase: x / RBC: x / WBC x   Sq Epi: x / Non Sq Epi: x / Bacteria: x        RADIOLOGY & ADDITIONAL STUDIES:  ~~~~~~~~~~~~~~~~~~~~~~~~~~~~~~    < from: CT Lumbar Spine Reform No Cont (08.16.24 @ 10:18) >      CT REFORM SPINE L      EXAM:  CT REFORM SPINE T     PROCEDURE DATE:  08/16/2024    < from: CT Lumbar Spine Reform No Cont (08.16.24 @ 10:18) >  IMPRESSION:  Acute fractures of the left seventh through 10th transverse processes and   adjacent 8 through 10th proximal ribs. Extrapleural hematoma is   associated with the rib fractures with active bleeding as described on   recent chest CT.  No additional fractures identified.  Imaging findings compatible with DISH  --- End of Report ---     CT Head No Cont (08.16.24 @ 09:10) >  CT CERVICAL SPINE  CT BRAIN   PROCEDURE DATE:  08/16/2024    IMPRESSION:  HEAD CT:  No evidence of an acute traumatic intracranial injury.  CERVICAL SPINE CT:  No evidence of an acute cervical spine fracture.  OTHER: 1.6 cm nodule left lobe of the thyroid gland. Correlate with   nonemergent ultrasonography if not recently performed  --- End of Report ---  < from: CT Abdomen and Pelvis w/ IV Cont (08.16.24 @ 09:11) >    ACC: [id number] EXAM:  CT ABDOMEN AND PELVIS IC   ORDERED BY: DC VINSON   Addendum:  IMPRESSION:  1.  Multifragmented, displaced posterior/medial LEFT 8th-9th rib, LEFT   T7, T8, T9, T10 transverse process fractures with associated large   pleural hematomas with active extravasation/hemorrhage  2.  Multiple pancreatic cysts and indeterminant RIGHT upper pole renal   lesion. Correlation with comorbidities may determine need for follow-up   and/or primary evaluation  --- End of Report ---       HPI:  89M PMH AFib on Eliquis, HTN, BPH, mild cognitive impairment, presents as a fall. He fell earlier this morning in his assisted living facility and was on the floor for at least an hour (true time unknown) before an attendant found him. He was brought in for evaluation. He states he sometimes walks with a cane at home, thinks he was using it but 'slipped and fell'. Reports pain in back and ribs. Denies HS. Trauma surgery consulted, no trauma code activated.    In ED was tachycardic to the 120s, SBP was 160 and he was saturating well on RA. Eliquis and metoprolol last taken 6pm 8/15 (evening prior to presentation). CT scan demonstrated L 8th, 9th rib fractures and L T7-10 TP fractures, as well as a L pleural hematoma with active extravasation. He received 1.5L fluids in the ED.  (16 Aug 2024 11:47)    PAST MEDICAL & SURGICAL HISTORY:  HTN (hypertension)  Afib  Chronic constipation  Insomnia  Hyperlipidemia  Anxiety and depression  Mild cognitive impairment  No significant past surgical history          REVIEW OF SYSTEMS:    CONSTITUTIONAL: No fever, weight loss, or fatigue  EYES: No eye pain, visual disturbances, or discharge  ENMT:  No difficulty hearing, tinnitus, vertigo; No sinus or throat pain  NECK: No pain or stiffness  BREASTS: No pain, masses, or nipple discharge  RESPIRATORY: No cough, wheezing, chills or hemoptysis; No shortness of breath  CARDIOVASCULAR: No chest pain, palpitations, dizziness, or leg swelling  GASTROINTESTINAL: No abdominal or epigastric pain. No nausea, vomiting, or hematemesis; No diarrhea or constipation. No melena or hematochezia.  GENITOURINARY: No dysuria, frequency, hematuria, or incontinence  NEUROLOGICAL: No headaches, memory loss, loss of strength, numbness, or tremors  SKIN: No itching, burning, rashes, or lesions   LYMPH NODES: No enlarged glands  ENDOCRINE: No heat or cold intolerance; No hair loss  MUSCULOSKELETAL: No joint pain or swelling; No muscle, back, or extremity pain  PSYCHIATRIC: No depression, anxiety, mood swings, or difficulty sleeping  HEME/LYMPH: No easy bruising, or bleeding gums  ALLERY AND IMMUNOLOGIC: No hives or eczema    Allergies  No Knon Allergies  Intolerances      MEDICATIONS  (STANDING):  chlorhexidine 2% Cloths 1 Application(s) Topical daily  metoprolol tartrate Injectable 5 milliGRAM(s) IV Push every 6 hours  prothrombin complex concentrate IVPB (KCENTRA) 4500 International Unit(s) IV Intermittent once    MEDICATIONS  (PRN):    SOCIAL HISTORY:  FAMILY HISTORY:  No pertinent family history in first degree relatives      Vital Signs Last 24 Hrs  T(C): 36.7 (16 Aug 2024 07:02), Max: 36.7 (16 Aug 2024 07:02)  T(F): 98 (16 Aug 2024 07:02), Max: 98 (16 Aug 2024 07:02)  HR: 108 (16 Aug 2024 12:00) (107 - 113)  BP: 105/64 (16 Aug 2024 12:00) (105/64 - 159/93)  BP(mean): 75 (16 Aug 2024 12:00) (75 - 82)  RR: 19 (16 Aug 2024 12:00) (18 - 19)  SpO2: 100% (16 Aug 2024 12:00) (97% - 100%)    Parameters below as of 16 Aug 2024 08:32  Patient On (Oxygen Delivery Method): room air        PHYSICAL EXAM:    GENERAL: NAD, sleepy but arousable, follows commands intermittently, diff to maintain following states he is tired.    HEAD:  Atraumatic, Normocephalic  EYES: EOMI, PERRLA, conjunctiva and sclera clear  ENMT: No tonsillar erythema, exudates, or enlargement; Moist mucous membranes, neg facial  NECK: Supple, No JVD,  NERVOUS SYSTEM:  Alert & Oriented X3; Motor Strength diff to access strength upper 5/5 triceps and biceps, lower extrem moving bilat strength diff to access.   CHEST/LUNG: Clear bilaterally;   HEART: Regular rate and rhythm; No murmurs, rubs, or gallops  ABDOMEN: Soft, Nontender, Nondistended; Bowel sounds present  EXTREMITIES:  2+ Peripheral Pulses, No edema.       LABS:                        10.4   11.78 )-----------( x        ( 16 Aug 2024 12:10 )             31.8     08-16    141  |  103  |  22.6<H>  ----------------------------<  139<H>  3.6   |  22.0  |  1.00    Ca    8.9      16 Aug 2024 08:02    TPro  6.5<L>  /  Alb  3.9  /  TBili  0.9  /  DBili  x   /  AST  26  /  ALT  13  /  AlkPhos  106  08-16    PT/INR - ( 16 Aug 2024 08:02 )   PT: 15.5 sec;   INR: 1.41 ratio         PTT - ( 16 Aug 2024 08:02 )  PTT:29.0 sec  Urinalysis Basic - ( 16 Aug 2024 08:02 )    Color: x / Appearance: x / SG: x / pH: x  Gluc: 139 mg/dL / Ketone: x  / Bili: x / Urobili: x   Blood: x / Protein: x / Nitrite: x   Leuk Esterase: x / RBC: x / WBC x   Sq Epi: x / Non Sq Epi: x / Bacteria: x        RADIOLOGY & ADDITIONAL STUDIES:  ~~~~~~~~~~~~~~~~~~~~~~~~~~~~~~    < from: CT Lumbar Spine Reform No Cont (08.16.24 @ 10:18) >      CT REFORM SPINE L      EXAM:  CT REFORM SPINE T     PROCEDURE DATE:  08/16/2024    < from: CT Lumbar Spine Reform No Cont (08.16.24 @ 10:18) >  IMPRESSION:  Acute fractures of the left seventh through 10th transverse processes and   adjacent 8 through 10th proximal ribs. Extrapleural hematoma is   associated with the rib fractures with active bleeding as described on   recent chest CT.  No additional fractures identified.  Imaging findings compatible with DISH  --- End of Report ---     CT Head No Cont (08.16.24 @ 09:10) >  CT CERVICAL SPINE  CT BRAIN   PROCEDURE DATE:  08/16/2024    IMPRESSION:  HEAD CT:  No evidence of an acute traumatic intracranial injury.  CERVICAL SPINE CT:  No evidence of an acute cervical spine fracture.  OTHER: 1.6 cm nodule left lobe of the thyroid gland. Correlate with   nonemergent ultrasonography if not recently performed  --- End of Report ---  < from: CT Abdomen and Pelvis w/ IV Cont (08.16.24 @ 09:11) >    ACC: [id number] EXAM:  CT ABDOMEN AND PELVIS IC   ORDERED BY: DC VINSON   Addendum:  IMPRESSION:  1.  Multifragmented, displaced posterior/medial LEFT 8th-9th rib, LEFT   T7, T8, T9, T10 transverse process fractures with associated large   pleural hematomas with active extravasation/hemorrhage  2.  Multiple pancreatic cysts and indeterminant RIGHT upper pole renal   lesion. Correlation with comorbidities may determine need for follow-up   and/or primary evaluation  --- End of Report ---

## 2024-08-16 NOTE — CHART NOTE - NSCHARTNOTEFT_GEN_A_CORE
Patient seen and examined, pale, diaphoretic, reports nausea. Patient is drowsy but arousable (unchanged from admission mental status). Patient's MAPs 70, however, remains in rapid Afib w/ RVR.  Sating 90% on 4L NC. Actively receiving 1unit PRBC for lactate of 5 and hgb drop from 12 to 9.  Patient's cxr reveals worsening extrapleural effusion of L lung.  Discussed with trauma attending, Thoracic surgery and IR team due to clinical change and worsening cxr.  IR will come in to embolize and possibly drain extrapleural collection.  Will continue supportive care till then.

## 2024-08-16 NOTE — H&P ADULT - ASSESSMENT
ASSESSMENT: Patient is a 89y male with above listed PMH who presents as a trauma consult after a fall earlier today. L 8th-9th rib fx with associated pleural hematoma w/active extrav, as well as L T7-10 TP fx. No other injuries identified on exam. GCS 14 (E3 for eyes opening to command). Given 1.5L in the ED for tachycardia and fluctuating BP, in AFib on monitor, last metoprolol and eliquis 6pm yesterday.    Per papers brought from [facility], patient is DNR. Daughter Amanda confirmed as much, but no official documentation was in chart.    PLAN:    - Admit to SICU  - Thoracic, Spine consults, appreciated  - NPO/IVF  - Pain control  - Hold Eliquis, consider reversal  - Give metop for rate control  - Rest of care as per SICU    Patient seen and plan discussed with attending, Dr. Jurado. Patient handed off at bedside to SICU team.     Catherine Garrett MD

## 2024-08-16 NOTE — H&P ADULT - NSHPSOCIALHISTORY_GEN_ALL_CORE
quit smoking 50 years ago. lives at [facility] assisted living facility in memory unit

## 2024-08-16 NOTE — ED PROVIDER NOTE - ATTENDING CONTRIBUTION TO CARE
Dr. Valentin: I personally saw the patient with the resident and performed a substantive portion of the visit. I performed a face to face bedside interview with patient regarding history of present illness, review of symptoms and past medical history. I completed an independent physical exam and all aspects of medical decision making. I have discussed patient's plan of care with resident. I agree with note as stated above, having amended the EMR as needed to reflect my findings. This includes HISTORY OF PRESENT ILLNESS, HIV, PAST MEDICAL/SURGICAL/FAMILY/SOCIAL HISTORY, ALLERGIES AND HOME MEDICATIONS, ROS, PHYSICAL EXAM, MEDICAL DECISION MAKING and any PROGRESS NOTES during the time I functioned as the attending physician for this patient.  SEE MDM  Patient was critically ill with a high probability of imminent or life threatening deterioration.  I have performed direct patient care (not related to procedure), additional history taking, interpretation of diagnostic studies, documentation, consultation with other physicians, telephone consultation with the patient's family.   I have personally and independently provided the amount of critical care time documented below excluding time spent on separate procedures.  critical care time: 40 mins

## 2024-08-16 NOTE — CONSULT NOTE ADULT - SUBJECTIVE AND OBJECTIVE BOX
HPI:  A 89 year-old male with history of A. fib on calculus, hypertension, BPH presents to [facility] ED with fall. Per the MR patient was found on floor for least hour (true downtime unknown). In the ED, CTA noted for left rib and vertebral fractures and left pleural hematoma with active extravasation.    Patient given K. Centro at 1252 4 out was reversal.    Patient admitted to SICU for further management.    IR consultation for possible embolization.      ============================================================================  Medications:  Home Medications:  amLODIPine 5 mg oral tablet: 1 tab(s) orally once a day (16 Aug 2024 11:13)  Colace 100 mg oral capsule: 2 cap(s) orally once a day (at bedtime) (16 Aug 2024 11:13)  Eliquis 5 mg oral tablet: 1 tab(s) orally 2 times a day (16 Aug 2024 11:13)  furosemide 20 mg oral tablet: 1 tab(s) orally once a day (16 Aug 2024 11:13)  Melatonin 3 mg oral tablet: 1 tab(s) orally once a day (at bedtime) (16 Aug 2024 11:13)  metoprolol tartrate 50 mg oral tablet: 1 tab(s) orally 2 times a day (16 Aug 2024 11:13)  Polyethylene Glycol 3350: 17 gram(s) orally once a day (16 Aug 2024 11:13)  pravastatin 80 mg oral tablet: 1 tab(s) orally once a day (16 Aug 2024 11:13)  ramipril 10 mg oral tablet: orally once a day (16 Aug 2024 11:13)  senna (sennosides) 8.6 mg oral tablet: 1 tab(s) orally once a day (16 Aug 2024 11:13)  venlafaxine 37.5 mg oral tablet: 1 tab(s) orally once a day (16 Aug 2024 11:13)    MEDICATIONS  (STANDING):  chlorhexidine 2% Cloths 1 Application(s) Topical daily  metoprolol tartrate Injectable 5 milliGRAM(s) IV Push every 6 hours  multiple electrolytes Injection Type 1 1000 milliLiter(s) (75 mL/Hr) IV Continuous <Continuous>  multiple electrolytes Injection Type 1 Bolus 500 milliLiter(s) IV Bolus once    MEDICATIONS  (PRN):      Allergies:   No Known Allergies    ============================================================================  PAST MEDICAL & SURGICAL HISTORY:  HTN (hypertension)      Afib      Chronic constipation      Insomnia      Hyperlipidemia      Anxiety and depression      Mild cognitive impairment      No significant past surgical history          FAMILY HISTORY:  No pertinent family history in first degree relatives        Social History:  quit smoking 50 years ago. lives at [facility] assisted living facility in memory unit (16 Aug 2024 11:47)      ============================================================================  Vitals:  Vital Signs Last 24 Hrs  T(C): 36.3 (16 Aug 2024 12:56), Max: 36.7 (16 Aug 2024 07:02)  T(F): 97.4 (16 Aug 2024 12:56), Max: 98 (16 Aug 2024 07:02)  HR: 110 (16 Aug 2024 16:00) (105 - 121)  BP: 118/91 (16 Aug 2024 15:00) (103/51 - 159/93)  BP(mean): 100 (16 Aug 2024 15:00) (68 - 100)  RR: 20 (16 Aug 2024 16:00) (13 - 20)  SpO2: 100% (16 Aug 2024 16:00) (97% - 100%)    Parameters below as of 16 Aug 2024 12:00  Patient On (Oxygen Delivery Method): nasal cannula  O2 Flow (L/min): 2    Labs:                        10.4   11.78 )-----------( 121      ( 16 Aug 2024 12:10 )             31.8     08-16    141  |  103  |  22.6<H>  ----------------------------<  139<H>  3.6   |  22.0  |  1.00    Ca    8.9      16 Aug 2024 08:02    TPro  6.5<L>  /  Alb  3.9  /  TBili  0.9  /  DBili  x   /  AST  26  /  ALT  13  /  AlkPhos  106  08-16    PT/INR - ( 16 Aug 2024 08:02 )   PT: 15.5 sec;   INR: 1.41 ratio         PTT - ( 16 Aug 2024 08:02 )  PTT:29.0 sec    Imaging:   Pertinent Imaging Reviewed.    ============================================================================

## 2024-08-16 NOTE — H&P ADULT - NSICDXPASTMEDICALHX_GEN_ALL_CORE_FT
PAST MEDICAL HISTORY:  Afib     Anxiety and depression     Chronic constipation     HTN (hypertension)     Hyperlipidemia     Insomnia     Mild cognitive impairment

## 2024-08-16 NOTE — ED ADULT NURSE REASSESSMENT NOTE - NS ED NURSE REASSESS COMMENT FT1
Transferred and Received Pt from [facility]. Pt placed on CM, sinus tachycardia noted Trauma resident present at bedside Repeat lactate drawn and sent pending results.

## 2024-08-16 NOTE — CONSULT NOTE ADULT - ASSESSMENT
The patient is 89-year-old male admitted for traumatic fall with multiple fractures. Seen in consultation for possible IR embolization. CT imaging reviewed by Dr. Araujo, left hematoma appears loculated and extrapleural with small foci of contrast noted within hematoma. Her SICU team patient is hemodynamically stable. Given these findings, recommend conservative management for now as active extravasation likely tamponade with AC reversal. Trend H&H and vital signs. If patient were to become hemodynamically unstable, please reconsult interventional radiology.    For after Hours Emergent consultations please contract  or call [phone]

## 2024-08-17 LAB
ACETONE SERPL-MCNC: NEGATIVE — SIGNIFICANT CHANGE UP
ANION GAP SERPL CALC-SCNC: 13 MMOL/L — SIGNIFICANT CHANGE UP (ref 5–17)
ANION GAP SERPL CALC-SCNC: 14 MMOL/L — SIGNIFICANT CHANGE UP (ref 5–17)
ANION GAP SERPL CALC-SCNC: 15 MMOL/L — SIGNIFICANT CHANGE UP (ref 5–17)
APTT BLD: 30.2 SEC — SIGNIFICANT CHANGE UP (ref 24.5–35.6)
BASOPHILS # BLD AUTO: 0 K/UL — SIGNIFICANT CHANGE UP (ref 0–0.2)
BASOPHILS # BLD AUTO: 0.04 K/UL — SIGNIFICANT CHANGE UP (ref 0–0.2)
BASOPHILS NFR BLD AUTO: 0 % — SIGNIFICANT CHANGE UP (ref 0–2)
BASOPHILS NFR BLD AUTO: 0.4 % — SIGNIFICANT CHANGE UP (ref 0–2)
BUN SERPL-MCNC: 25.4 MG/DL — HIGH (ref 8–20)
BUN SERPL-MCNC: 26.9 MG/DL — HIGH (ref 8–20)
BUN SERPL-MCNC: 28.5 MG/DL — HIGH (ref 8–20)
CALCIUM SERPL-MCNC: 7.9 MG/DL — LOW (ref 8.4–10.5)
CALCIUM SERPL-MCNC: 8.2 MG/DL — LOW (ref 8.4–10.5)
CALCIUM SERPL-MCNC: 8.3 MG/DL — LOW (ref 8.4–10.5)
CHLORIDE SERPL-SCNC: 101 MMOL/L — SIGNIFICANT CHANGE UP (ref 96–108)
CHLORIDE SERPL-SCNC: 102 MMOL/L — SIGNIFICANT CHANGE UP (ref 96–108)
CHLORIDE SERPL-SCNC: 105 MMOL/L — SIGNIFICANT CHANGE UP (ref 96–108)
CO2 SERPL-SCNC: 20 MMOL/L — LOW (ref 22–29)
CO2 SERPL-SCNC: 22 MMOL/L — SIGNIFICANT CHANGE UP (ref 22–29)
CO2 SERPL-SCNC: 23 MMOL/L — SIGNIFICANT CHANGE UP (ref 22–29)
CREAT SERPL-MCNC: 1.2 MG/DL — SIGNIFICANT CHANGE UP (ref 0.5–1.3)
CREAT SERPL-MCNC: 1.22 MG/DL — SIGNIFICANT CHANGE UP (ref 0.5–1.3)
CREAT SERPL-MCNC: 1.36 MG/DL — HIGH (ref 0.5–1.3)
EGFR: 50 ML/MIN/1.73M2 — LOW
EGFR: 57 ML/MIN/1.73M2 — LOW
EGFR: 58 ML/MIN/1.73M2 — LOW
EOSINOPHIL # BLD AUTO: 0 K/UL — SIGNIFICANT CHANGE UP (ref 0–0.5)
EOSINOPHIL # BLD AUTO: 0.04 K/UL — SIGNIFICANT CHANGE UP (ref 0–0.5)
EOSINOPHIL NFR BLD AUTO: 0 % — SIGNIFICANT CHANGE UP (ref 0–6)
EOSINOPHIL NFR BLD AUTO: 0.4 % — SIGNIFICANT CHANGE UP (ref 0–6)
GIANT PLATELETS BLD QL SMEAR: PRESENT — SIGNIFICANT CHANGE UP
GLUCOSE SERPL-MCNC: 111 MG/DL — HIGH (ref 70–99)
GLUCOSE SERPL-MCNC: 138 MG/DL — HIGH (ref 70–99)
GLUCOSE SERPL-MCNC: 144 MG/DL — HIGH (ref 70–99)
HCT VFR BLD CALC: 27.4 % — LOW (ref 39–50)
HCT VFR BLD CALC: 28.5 % — LOW (ref 39–50)
HGB BLD-MCNC: 9.4 G/DL — LOW (ref 13–17)
HGB BLD-MCNC: 9.7 G/DL — LOW (ref 13–17)
IMM GRANULOCYTES NFR BLD AUTO: 0.6 % — SIGNIFICANT CHANGE UP (ref 0–0.9)
INR BLD: 1.25 RATIO — HIGH (ref 0.85–1.18)
LACTATE SERPL-SCNC: 2.2 MMOL/L — HIGH (ref 0.5–2)
LACTATE SERPL-SCNC: 2.9 MMOL/L — HIGH (ref 0.5–2)
LACTATE SERPL-SCNC: 3.3 MMOL/L — HIGH (ref 0.5–2)
LYMPHOCYTES # BLD AUTO: 1.04 K/UL — SIGNIFICANT CHANGE UP (ref 1–3.3)
LYMPHOCYTES # BLD AUTO: 1.64 K/UL — SIGNIFICANT CHANGE UP (ref 1–3.3)
LYMPHOCYTES # BLD AUTO: 10.5 % — LOW (ref 13–44)
LYMPHOCYTES # BLD AUTO: 14.6 % — SIGNIFICANT CHANGE UP (ref 13–44)
MAGNESIUM SERPL-MCNC: 1.9 MG/DL — SIGNIFICANT CHANGE UP (ref 1.6–2.6)
MAGNESIUM SERPL-MCNC: 1.9 MG/DL — SIGNIFICANT CHANGE UP (ref 1.8–2.6)
MANUAL SMEAR VERIFICATION: SIGNIFICANT CHANGE UP
MCHC RBC-ENTMCNC: 30.6 PG — SIGNIFICANT CHANGE UP (ref 27–34)
MCHC RBC-ENTMCNC: 30.6 PG — SIGNIFICANT CHANGE UP (ref 27–34)
MCHC RBC-ENTMCNC: 34 GM/DL — SIGNIFICANT CHANGE UP (ref 32–36)
MCHC RBC-ENTMCNC: 34.3 GM/DL — SIGNIFICANT CHANGE UP (ref 32–36)
MCV RBC AUTO: 89.3 FL — SIGNIFICANT CHANGE UP (ref 80–100)
MCV RBC AUTO: 89.9 FL — SIGNIFICANT CHANGE UP (ref 80–100)
MONOCYTES # BLD AUTO: 0.53 K/UL — SIGNIFICANT CHANGE UP (ref 0–0.9)
MONOCYTES # BLD AUTO: 1.43 K/UL — HIGH (ref 0–0.9)
MONOCYTES NFR BLD AUTO: 12.8 % — SIGNIFICANT CHANGE UP (ref 2–14)
MONOCYTES NFR BLD AUTO: 5.3 % — SIGNIFICANT CHANGE UP (ref 2–14)
NEUTROPHILS # BLD AUTO: 7.98 K/UL — HIGH (ref 1.8–7.4)
NEUTROPHILS # BLD AUTO: 8.37 K/UL — HIGH (ref 1.8–7.4)
NEUTROPHILS NFR BLD AUTO: 71.2 % — SIGNIFICANT CHANGE UP (ref 43–77)
NEUTROPHILS NFR BLD AUTO: 84.2 % — HIGH (ref 43–77)
PHOSPHATE SERPL-MCNC: 2.8 MG/DL — SIGNIFICANT CHANGE UP (ref 2.4–4.7)
PHOSPHATE SERPL-MCNC: 3.9 MG/DL — SIGNIFICANT CHANGE UP (ref 2.4–4.7)
PLAT MORPH BLD: NORMAL — SIGNIFICANT CHANGE UP
PLATELET # BLD AUTO: 100 K/UL — LOW (ref 150–400)
PLATELET # BLD AUTO: 111 K/UL — LOW (ref 150–400)
POIKILOCYTOSIS BLD QL AUTO: SLIGHT — SIGNIFICANT CHANGE UP
POTASSIUM SERPL-MCNC: 4 MMOL/L — SIGNIFICANT CHANGE UP (ref 3.5–5.3)
POTASSIUM SERPL-MCNC: 4 MMOL/L — SIGNIFICANT CHANGE UP (ref 3.5–5.3)
POTASSIUM SERPL-MCNC: 4.4 MMOL/L — SIGNIFICANT CHANGE UP (ref 3.5–5.3)
POTASSIUM SERPL-SCNC: 4 MMOL/L — SIGNIFICANT CHANGE UP (ref 3.5–5.3)
POTASSIUM SERPL-SCNC: 4 MMOL/L — SIGNIFICANT CHANGE UP (ref 3.5–5.3)
POTASSIUM SERPL-SCNC: 4.4 MMOL/L — SIGNIFICANT CHANGE UP (ref 3.5–5.3)
PROTHROM AB SERPL-ACNC: 13.8 SEC — HIGH (ref 9.5–13)
RBC # BLD: 3.07 M/UL — LOW (ref 4.2–5.8)
RBC # BLD: 3.17 M/UL — LOW (ref 4.2–5.8)
RBC # FLD: 14 % — SIGNIFICANT CHANGE UP (ref 10.3–14.5)
RBC # FLD: 14.3 % — SIGNIFICANT CHANGE UP (ref 10.3–14.5)
RBC BLD AUTO: ABNORMAL
SODIUM SERPL-SCNC: 136 MMOL/L — SIGNIFICANT CHANGE UP (ref 135–145)
SODIUM SERPL-SCNC: 138 MMOL/L — SIGNIFICANT CHANGE UP (ref 135–145)
SODIUM SERPL-SCNC: 141 MMOL/L — SIGNIFICANT CHANGE UP (ref 135–145)
WBC # BLD: 11.2 K/UL — HIGH (ref 3.8–10.5)
WBC # BLD: 9.94 K/UL — SIGNIFICANT CHANGE UP (ref 3.8–10.5)
WBC # FLD AUTO: 11.2 K/UL — HIGH (ref 3.8–10.5)
WBC # FLD AUTO: 9.94 K/UL — SIGNIFICANT CHANGE UP (ref 3.8–10.5)

## 2024-08-17 PROCEDURE — 99232 SBSQ HOSP IP/OBS MODERATE 35: CPT

## 2024-08-17 PROCEDURE — 99231 SBSQ HOSP IP/OBS SF/LOW 25: CPT

## 2024-08-17 PROCEDURE — 71045 X-RAY EXAM CHEST 1 VIEW: CPT | Mod: 26

## 2024-08-17 PROCEDURE — 99232 SBSQ HOSP IP/OBS MODERATE 35: CPT | Mod: FS

## 2024-08-17 RX ORDER — ACETAMINOPHEN 325 MG/1
1000 TABLET ORAL EVERY 6 HOURS
Refills: 0 | Status: COMPLETED | OUTPATIENT
Start: 2024-08-17 | End: 2024-08-18

## 2024-08-17 RX ORDER — POVIDONE, PROPYLENE GLYCOL 6.8; 3 MG/ML; MG/ML
1 LIQUID OPHTHALMIC EVERY 6 HOURS
Refills: 0 | Status: DISCONTINUED | OUTPATIENT
Start: 2024-08-17 | End: 2024-08-21

## 2024-08-17 RX ORDER — METOPROLOL TARTRATE 100 MG/1
50 TABLET ORAL ONCE
Refills: 0 | Status: COMPLETED | OUTPATIENT
Start: 2024-08-17 | End: 2024-08-17

## 2024-08-17 RX ORDER — SENNA 187 MG
2 TABLET ORAL AT BEDTIME
Refills: 0 | Status: DISCONTINUED | OUTPATIENT
Start: 2024-08-17 | End: 2024-08-21

## 2024-08-17 RX ORDER — TAMSULOSIN HYDROCHLORIDE 0.4 MG/1
0.4 CAPSULE ORAL AT BEDTIME
Refills: 0 | Status: DISCONTINUED | OUTPATIENT
Start: 2024-08-17 | End: 2024-08-21

## 2024-08-17 RX ORDER — POLYETHYLENE GLYCOL 3350 17 G/17G
17 POWDER, FOR SOLUTION ORAL DAILY
Refills: 0 | Status: DISCONTINUED | OUTPATIENT
Start: 2024-08-17 | End: 2024-08-21

## 2024-08-17 RX ORDER — METOPROLOL TARTRATE 100 MG/1
50 TABLET ORAL
Refills: 0 | Status: DISCONTINUED | OUTPATIENT
Start: 2024-08-17 | End: 2024-08-21

## 2024-08-17 RX ORDER — LIDOCAINE/BENZALKONIUM/ALCOHOL
2 SOLUTION, NON-ORAL TOPICAL DAILY
Refills: 0 | Status: DISCONTINUED | OUTPATIENT
Start: 2024-08-17 | End: 2024-08-21

## 2024-08-17 RX ADMIN — Medication 2 TABLET(S): at 22:30

## 2024-08-17 RX ADMIN — METOPROLOL TARTRATE 50 MILLIGRAM(S): 100 TABLET ORAL at 17:30

## 2024-08-17 RX ADMIN — TAMSULOSIN HYDROCHLORIDE 0.4 MILLIGRAM(S): 0.4 CAPSULE ORAL at 22:30

## 2024-08-17 RX ADMIN — ACETAMINOPHEN 400 MILLIGRAM(S): 325 TABLET ORAL at 10:31

## 2024-08-17 RX ADMIN — ACETAMINOPHEN 1000 MILLIGRAM(S): 325 TABLET ORAL at 18:33

## 2024-08-17 RX ADMIN — ACETAMINOPHEN 1000 MILLIGRAM(S): 325 TABLET ORAL at 11:13

## 2024-08-17 RX ADMIN — ACETAMINOPHEN 400 MILLIGRAM(S): 325 TABLET ORAL at 18:15

## 2024-08-17 RX ADMIN — METOPROLOL TARTRATE 50 MILLIGRAM(S): 100 TABLET ORAL at 10:31

## 2024-08-17 RX ADMIN — Medication 2 PATCH: at 18:00

## 2024-08-17 RX ADMIN — POVIDONE, PROPYLENE GLYCOL 1 DROP(S): 6.8; 3 LIQUID OPHTHALMIC at 10:31

## 2024-08-17 RX ADMIN — Medication 2 PATCH: at 11:04

## 2024-08-17 RX ADMIN — Medication 20 MILLIGRAM(S): at 22:30

## 2024-08-17 RX ADMIN — METOPROLOL TARTRATE 5 MILLIGRAM(S): 100 TABLET ORAL at 04:58

## 2024-08-17 RX ADMIN — CHLORHEXIDINE GLUCONATE 1 APPLICATION(S): 40 SOLUTION TOPICAL at 11:05

## 2024-08-17 NOTE — PROGRESS NOTE ADULT - SUBJECTIVE AND OBJECTIVE BOX
SICU Attending Progress Note    24H Events:  - s/p kcentra   - Afib RVR with hypotension improved with metoprolol    - IR emobolization with no signs of active bleeding   - s/p 1 unit PRBC         OBJECTIVE:   T(C): 36.3 (08-17-24 @ 07:00), Max: 36.6 (08-17-24 @ 04:21)  HR: 108 (08-17-24 @ 09:00) (91 - 134)  BP: 138/93 (08-17-24 @ 09:00) (103/51 - 169/94)  RR: 15 (08-17-24 @ 09:00) (10 - 28)  SpO2: 98% (08-17-24 @ 09:00) (84% - 100%)      08-16-24 @ 07:01  -  08-17-24 @ 07:00  --------------------------------------------------------  IN: 1725 mL / OUT: 1355 mL / NET: 370 mL    08-17-24 @ 07:01  -  08-17-24 @ 10:02  --------------------------------------------------------  IN: 150 mL / OUT: 150 mL / NET: 0 mL        CURRENT MEDS:   acetaminophen   IVPB .. 1000 milliGRAM(s) IV Intermittent every 6 hours PRN  chlorhexidine 2% Cloths 1 Application(s) Topical daily  metoprolol tartrate Injectable 5 milliGRAM(s) IV Push every 6 hours  multiple electrolytes Injection Type 1 1000 milliLiter(s) IV Continuous <Continuous>      HOME MEDS:  amLODIPine 5 mg oral tablet: 1 tab(s) orally once a day  Colace 100 mg oral capsule: 2 cap(s) orally once a day (at bedtime)  Eliquis 5 mg oral tablet: 1 tab(s) orally 2 times a day  furosemide 20 mg oral tablet: 1 tab(s) orally once a day  Melatonin 3 mg oral tablet: 1 tab(s) orally once a day (at bedtime)  metoprolol tartrate 50 mg oral tablet: 1 tab(s) orally 2 times a day  Polyethylene Glycol 3350: 17 gram(s) orally once a day  pravastatin 80 mg oral tablet: 1 tab(s) orally once a day  ramipril 10 mg oral tablet: orally once a day  senna (sennosides) 8.6 mg oral tablet: 1 tab(s) orally once a day  venlafaxine 37.5 mg oral tablet: 1 tab(s) orally once a day      PHYSICAL EXAM     GENERAL: NAD, Resting in bed  HEENT:  Head atraumatic, Moist mucous membranes. No JVD   CHEST/LUNG: Symmetric and unlabored respirations  HEART: Baseline rhythm with palpable distal pulses   ABDOMEN: Abdomen soft nontender.   EXTREMITIES:  No clubbing, cyanosis, or edema  NERVOUS SYSTEM:  Alert & Oriented X3, non-focal and spontaneous movements of all extremities  SKIN: No rashes or lesions

## 2024-08-17 NOTE — PROGRESS NOTE ADULT - ASSESSMENT
89-year old s/p fall with left sided rib fractures, transverse process fx, and extrapleural hematoma / hemothorax     NEURO:   #acute post traumatic pain   - multimodal pain regimen    #delirium   - delirium precautions     CARDIO:   #afib RVR, hypotension, hx of hypertension   - hold amlodipine and ace inhibitor   - start PO metoprolol. PRN IV metoprolol   - ECHO 60%     #Hyperlipidemia: c/w statin.     PULM:   #rib fractures, TP fractures, hemothorax/pleural hematoma   - PIC protocol   - pain management and consider rib block   - CT surgery consulted   - Will hydrate and repeat CT in a couple days     GI: Will clarify need for operative intervention and start diet     RENAL:   #Urinary Retention, metabolic/lactic acidosis, LENARD   - volume resuscitation   - Maintain jarrell   - LENARD resolving     HEME:   #At risk for DVT: SCD + Hold HSQ   #acute blood loss anemia: trend and transfuse for HGB <7   - s/p kecentra   - 1 unit PRBC     ID: afebrile/no leukocytosis     ENDO:  Maintain euglycemia     LINES/DRAINS/AIRWAY: PIV     CODE STATUS: DNR, trial of intubation     DISPOSITION: SICU

## 2024-08-17 NOTE — PROGRESS NOTE ADULT - SUBJECTIVE AND OBJECTIVE BOX
HPI: 89M PMH AFib on Eliquis, HTN, BPH, mild cognitive impairment, presents as a fall. He fell earlier this morning in his assisted living facility and was on the floor for at least an hour (true time unknown) before an attendant found him. He was brought in for evaluation. He states he sometimes walks with a cane at home, thinks he was using it but 'slipped and fell'. Reports pain in back and ribs. Denies HS. Trauma surgery consulted, no trauma code activated.    In ED was tachycardic to the 120s, SBP was 160 and he was saturating well on RA. Eliquis and metoprolol last taken 6pm 8/15 (evening prior to presentation). CT scan demonstrated L 8th, 9th rib fractures and L T7-10 TP fractures, as well as a L pleural hematoma with active extravasation. He received 1.5L fluids in the ED.  (16 Aug 2024 11:47)    INTERVAL HPI/OVERNIGHT EVENTS:  89y Male seen lying comfortably in bed. Tolerating diet. Salguero in with clear urine. No acute complaints at this time.    Vital Signs Last 24 Hrs  T(C): 36.3 (17 Aug 2024 07:00), Max: 36.6 (17 Aug 2024 04:21)  T(F): 97.4 (17 Aug 2024 07:00), Max: 97.9 (17 Aug 2024 04:21)  HR: 94 (17 Aug 2024 13:00) (91 - 134)  BP: 101/58 (17 Aug 2024 13:00) (101/58 - 169/94)  BP(mean): 71 (17 Aug 2024 13:00) (66 - 113)  RR: 18 (17 Aug 2024 13:00) (10 - 28)  SpO2: 98% (17 Aug 2024 13:00) (84% - 100%)    Parameters below as of 17 Aug 2024 12:00  Patient On (Oxygen Delivery Method): nasal cannula  O2 Flow (L/min): 2    PHYSICAL EXAM:  GENERAL: NAD, well-groomed  HEAD:  Atraumatic, normocephalic  NETO COMA SCORE: E- V- M- = 15  MENTAL STATUS: AAO x3; Awake; Opens eyes spontaneously; Appropriately conversant without aphasia; following simple commands  CRANIAL NERVES: PERRL. EOMI without nystagmus. Face symmetric w/ normal eye closure and smile, tongue midline. Hearing grossly intact. Speech clear. Head turning and shoulder shrug intact.   MOTOR: strength 5/5 b/l upper and lower extremities  EXTREMITIES: no clubbing, cyanosis, or edema  SKIN: Warm, dry    LABS:                        9.4    11.20 )-----------( 111      ( 17 Aug 2024 11:35 )             27.4     08-17    141  |  105  |  26.9<H>  ----------------------------<  111<H>  4.0   |  22.0  |  1.22    Ca    8.3<L>      17 Aug 2024 11:35  Phos  2.8     08-17  Mg     1.9     08-17    TPro  5.6<L>  /  Alb  3.3  /  TBili  1.2  /  DBili  x   /  AST  29  /  ALT  14  /  AlkPhos  82  08-16    PT/INR - ( 17 Aug 2024 02:00 )   PT: 13.8 sec;   INR: 1.25 ratio         PTT - ( 17 Aug 2024 02:00 )  PTT:30.2 sec  Urinalysis Basic - ( 17 Aug 2024 11:35 )    Color: x / Appearance: x / SG: x / pH: x  Gluc: 111 mg/dL / Ketone: x  / Bili: x / Urobili: x   Blood: x / Protein: x / Nitrite: x   Leuk Esterase: x / RBC: x / WBC x   Sq Epi: x / Non Sq Epi: x / Bacteria: x      08-16 @ 07:01 - 08-17 @ 07:00  --------------------------------------------------------  IN: 1725 mL / OUT: 1355 mL / NET: 370 mL    08-17 @ 07:01 - 08-17 @ 13:53  --------------------------------------------------------  IN: 550 mL / OUT: 280 mL / NET: 270 mL      RADIOLOGY & ADDITIONAL TESTS:    < from: CT Lumbar Spine Reform No Cont (08.16.24 @ 10:18) >  IMPRESSION:  Acute fractures of the left seventh through 10th transverse processes and   adjacent 8 through 10th proximal ribs. Extrapleural hematoma is   associated with the rib fractures with active bleeding as described on   recent chest CT.  No additional fractures identified.  Imaging findings compatible with DISH

## 2024-08-17 NOTE — PROGRESS NOTE ADULT - ASSESSMENT
89 year old male with h/o AFib on Eliquis, HTN, BPH, mild cognitive impairment, presents to [facility] ED from [facility] s/p mechanical fall.  Pt was on floor for at least one hour before being found by staff.  On CT Chest with IV Contrast, pt with multifragmented, displaced posterior/medial left 8th-9th rib, left T7 - T10 transverse process fractures with associated large pleural hematomas with active extravasation/hemorrhage. Because of loculated/masslike configuration of lower LEFT thoracic hematomas, extrapleural hematomas, insinuated between lower LEFT parietal pleura and intercostal muscles is an alternate possibility.  Pt admitted to Trauma service.  Pt receiving KCentra 2/2 recent Eliquis.  Thoracic Surgery consulted for active extravasation seen on CT.

## 2024-08-17 NOTE — PROCEDURE NOTE - PROCEDURE FINDINGS AND DETAILS
Patient with left chest hematoma presents for angiogram and possible embolization. Multiple left intercostal arteries interrogated and angiography performed. No evidence of active bleeding noted. No embolization performed. Hemostasis achieved at the right common femoral arteriotomy via mynx closure.

## 2024-08-17 NOTE — PROGRESS NOTE ADULT - ASSESSMENT
This is a 89ym living in an assistant living facility with mild cognitive impairment, HTN, Afib on Xarelto. Posterior t-p fracture of the left side 7 thru 10th.     Plan  - Admitted to SICU  - Multiple left sided TP fractures 7th - 10th  - MRI of the thoracic/lumbar spine recommened if pt can tolerate  - Dr Swanson reviewed films and exam with staff and recommended as above  - Further plan per primary team  - Discussed with neurosurgeon on call Dr. Saldaña This is a 89ym living in an assistant living facility with mild cognitive impairment, HTN, Afib on Xarelto. Posterior t-p fracture of the left side 7 thru 10th.     Plan  - Admitted to SICU  - Multiple left sided TP fractures 7th - 10th  - MRI of the thoracic/lumbar spine recommended if pt can tolerate  - Dr Swanson reviewed films and exam with staff and recommended as above  - Further plan per primary team  - Discussed with neurosurgeon on call Dr. Saldaña

## 2024-08-17 NOTE — PROGRESS NOTE ADULT - SUBJECTIVE AND OBJECTIVE BOX
Subjective:    VITAL SIGNS  Vital Signs Last 24 Hrs  T(C): 36.3 (24 @ 07:00), Max: 36.6 (24 @ 04:21)  T(F): 97.4 (24 @ 07:00), Max: 97.9 (24 @ 04:21)  HR: 108 (24 @ 09:00) (91 - 134)  BP: 138/93 (24 @ 09:00) (103/51 - 169/94)  RR: 15 (24 @ 09:00) (10 - 28)  SpO2: 98% (24 @ 09:00) (84% - 100%)  on (O2)              Telemetry:    LVEF:     MEDICATIONS  acetaminophen   IVPB .. 1000 milliGRAM(s) IV Intermittent every 6 hours PRN  chlorhexidine 2% Cloths 1 Application(s) Topical daily  metoprolol tartrate Injectable 5 milliGRAM(s) IV Push every 6 hours  multiple electrolytes Injection Type 1 1000 milliLiter(s) IV Continuous <Continuous>      PHYSICAL EXAM  General: well nourished, well developed, no acute distress  Neurology: alert and oriented x 3, nonfocal, no gross deficits  Respiratory: clear to auscultation bilaterally  CV: regular rate and rhythm, normal S1, S2  Abdomen: soft, nontender, nondistended, positive bowel sounds, last bowel movement   Extremities: warm, well perfused. no edema. + DP pulses  Incisions: midline sternal incision, + mepilex, c/d/i. sternum stable.  Chest tubes:   Epicardial Wires:    > EPM (settings) / isolated    I&O's Detail    16 Aug 2024 07:01  -  17 Aug 2024 07:00  --------------------------------------------------------  IN:    multiple electrolytes Injection Type 1 Bolus: 500 mL    multiple electrolytes Injection Type 1.: 900 mL    PRBCs (Packed Red Blood Cells): 325 mL  Total IN: 1725 mL    OUT:    Indwelling Catheter - Urethral (mL): 1355 mL  Total OUT: 1355 mL    Total NET: 370 mL      17 Aug 2024 07:01  -  17 Aug 2024 09:43  --------------------------------------------------------  IN:    multiple electrolytes Injection Type 1.: 150 mL  Total IN: 150 mL    OUT:    Indwelling Catheter - Urethral (mL): 150 mL  Total OUT: 150 mL    Total NET: 0 mL          Weights:  Daily Height in cm: 170.18 (16 Aug 2024 12:00)    Daily Weight in k.8 (17 Aug 2024 04:21)  Admit Wt: Drug Dosing Weight  Height (cm): 170.2 (16 Aug 2024 12:00)  Weight (kg): 85.8 (16 Aug 2024 12:00)  BMI (kg/m2): 29.6 (16 Aug 2024 12:00)  BSA (m2): 1.97 (16 Aug 2024 12:00)    LABS      136  |  101  |  25.4<H>  ----------------------------<  138<H>  4.4   |  20.0<L>  |  1.20    Ca    7.9<L>      17 Aug 2024 02:00  Phos  3.9       Mg     1.9         TPro  5.6<L>  /  Alb  3.3  /  TBili  1.2  /  DBili  x   /  AST  29  /  ALT  14  /  AlkPhos  82                                   9.7    9.94  )-----------( 100      ( 17 Aug 2024 02:00 )             28.5          PT/INR - ( 17 Aug 2024 02:00 )   PT: 13.8 sec;   INR: 1.25 ratio         PTT - ( 17 Aug 2024 02:00 )  PTT:30.2 sec      Bilirubin Total: 1.2 mg/dL ( @ 22:57)    CAPILLARY BLOOD GLUCOSE               Today's CXR:    Today's EKG:    PAST MEDICAL & SURGICAL HISTORY:  HTN (hypertension)      Afib      Chronic constipation      Insomnia      Hyperlipidemia      Anxiety and depression      Mild cognitive impairment      No significant past surgical history            Subjective:  Pt. seen & examined in bed, NAD noted.    VITAL SIGNS  Vital Signs Last 24 Hrs  T(C): 36.3 (24 @ 07:00), Max: 36.6 (24 @ 04:21)  T(F): 97.4 (24 @ 07:00), Max: 97.9 (24 @ 04:21)  HR: 108 (24 @ 09:00) (91 - 134)  BP: 138/93 (24 @ 09:00) (103/51 - 169/94)  RR: 15 (24 @ 09:00) (10 - 28)  SpO2: 98% (24 @ 09:00) (84% - 100%)  on (O2)              Telemetry:  Afib      MEDICATIONS  acetaminophen   IVPB .. 1000 milliGRAM(s) IV Intermittent every 6 hours PRN  chlorhexidine 2% Cloths 1 Application(s) Topical daily  metoprolol tartrate Injectable 5 milliGRAM(s) IV Push every 6 hours  multiple electrolytes Injection Type 1 1000 milliLiter(s) IV Continuous <Continuous>      PHYSICAL EXAM  General: no acute distress  Neurology: alert and oriented x 3, nonfocal, no gross deficits  Respiratory: Diminished at the left base   CV: Irregularly irregular rate and rhythm, normal S1, S2  Abdomen: soft, nontender, nondistended, positive bowel sounds  Extremities: warm, well perfused. +1 edema x2BLE. + DP pulses      I&O's Detail    16 Aug 2024 07:01  -  17 Aug 2024 07:00  --------------------------------------------------------  IN:    multiple electrolytes Injection Type 1 Bolus: 500 mL    multiple electrolytes Injection Type 1.: 900 mL    PRBCs (Packed Red Blood Cells): 325 mL  Total IN: 1725 mL    OUT:    Indwelling Catheter - Urethral (mL): 1355 mL  Total OUT: 1355 mL    Total NET: 370 mL      17 Aug 2024 07:01  -  17 Aug 2024 09:43  --------------------------------------------------------  IN:    multiple electrolytes Injection Type 1.: 150 mL  Total IN: 150 mL    OUT:    Indwelling Catheter - Urethral (mL): 150 mL  Total OUT: 150 mL    Total NET: 0 mL          Weights:  Daily Height in cm: 170.18 (16 Aug 2024 12:00)    Daily Weight in k.8 (17 Aug 2024 04:21)  Admit Wt: Drug Dosing Weight  Height (cm): 170.2 (16 Aug 2024 12:00)  Weight (kg): 85.8 (16 Aug 2024 12:00)  BMI (kg/m2): 29.6 (16 Aug 2024 12:00)  BSA (m2): 1.97 (16 Aug 2024 12:00)    LABS      136  |  101  |  25.4<H>  ----------------------------<  138<H>  4.4   |  20.0<L>  |  1.20    Ca    7.9<L>      17 Aug 2024 02:00  Phos  3.9       Mg     1.9         TPro  5.6<L>  /  Alb  3.3  /  TBili  1.2  /  DBili  x   /  AST  29  /  ALT  14  /  AlkPhos  82                                   9.7    9.94  )-----------( 100      ( 17 Aug 2024 02:00 )             28.5          PT/INR - ( 17 Aug 2024 02:00 )   PT: 13.8 sec;   INR: 1.25 ratio         PTT - ( 17 Aug 2024 02:00 )  PTT:30.2 sec      Bilirubin Total: 1.2 mg/dL ( @ 22:57)    CAPILLARY BLOOD GLUCOSE               CXR:< from: Xray Chest 1 View- PORTABLE-Routine (Xray Chest 1 View- PORTABLE-Routine in AM.) (24 @ 06:11) >  ACC: [id number] EXAM:  XR CHEST PORTABLE ROUTINE 1V   ORDERED BY: COLLINS MATTHEW     ACC: [id number] EXAM:  XR CHEST PORTABLE URGENT 1V   ORDERED BY: GEN FELDMAN     ACC: [id number] EXAM:  XR CHEST PORTABLE URGENT 1V   ORDERED BY: DC VINSON     PROCEDURE DATE:  2024          INTERPRETATION:  Chest one view 2024 9:22 AM    HISTORY: Patient fell    COMPARISON STUDY: 2024    Frontal expiratory view of the chest shows the heart to be similarly   enlarged in size. The lungs show left lower lobe infiltrate or pulmonary   contusion and there is no evidence of pneumothorax nor pleural effusion.    Chest one view 2024 5:23 PM  Compared to the prior study, there is progression of large left pleural   effusion. No pneumothorax.    Chest one view 2024 5:44 AM  Compared to the prior study, left pleural effusion has increased in size.    IMPRESSION:  Progression of left pleural effusion or hemothorax in the proper clinical   setting.        Thank you for the courtesy of this referral.    --- End of Report ---            SHAUN RUEDA MD; Attending Interventional Radiologist  This document has been electronically signed. Aug 17 2024 10:04AM    < end of copied text >      < from: CT Lumbar Spine Reform No Cont (24 @ 10:18) >  ACC: [id number] EXAM:  CT REFORM SPINE L   ORDERED BY: DC VINSON     ACC: [id number] EXAM:  CT REFORM SPINE T   ORDERED BY: DC VINSON     PROCEDURE DATE:  2024          INTERPRETATION:  CT THORACIC SPINE REFORMAT, CT LUMBAR SPINE REFORMAT    CLINICAL INFORMATION: Fall, Rib pain, High back, low back pain, LLQ abd   pain    TECHNIQUE:  Axial, sagittal and coronal images were reconstructed from data from the   chest, abdomen, and pelvis CTs.    THORACIC SPINE FINDINGS:  *  FRACTURES:  Acute fractures of the left seventh through 10th transverse processes and   adjacent eighth through 10th proximal ribs. Extrapleural hematomas   associated with the rib fractures with associated active bleeding as   described on recent chest CT.  *  ALIGNMENT:  Exaggeration of the normal thoracic kyphosis. Mild dextroscoliosis. No   subluxation.  *  SPINAL COLUMN:  Vertebral body heights are well-maintained. Large anterior bridging   osteophytes compatible with DISH. Calcification/partial fusion of the   T5-6 through T11-12 disc spaces.    LUMBAR SPINE FINDINGS:  *  FRACTURES:  No evidence of an acute lumbar spine fracture.  *  ALIGNMENT:  Grade 1 anterolisthesis L4 and L5 related to facet DJD.  *  SPINAL COLUMN:  Vertebral body heights are well-maintained. Endplate osteophytes are seen   throughout.  Narrowing of the L4-5 disc space.  *  DISC LEVELS:  T12/L1: No disc bulge or protrusion. Canal and neural foramina are patent  L1-2: Mild disc bulge. Mild facet degenerative changes. Mild [place]   bilateral neural foramina narrowing  L2-3: Mild disc bulge. Moderate right greater than left facet DJD.   Moderate canal and moderate right neural foramina narrowing. Mild left   neural foramina narrowing  L3-4: Mild bulge. Severe right and moderate left facet DJD. Moderate   canal, and moderate right greater than left neural foramina narrowing.  L4-5: Grade 1 anterolisthesis related to severe facet DJD. Mild canal and   moderate bilateral neural foramina narrowing.  L5-S1: No disc protrusion. Severe left and moderate right facet DJD.   Canal and neural foramina appear patent  *  OTHER:  Visualized sacrum appears intact.  Anterior bridging osteophytes about the sacroiliac joints    IMPRESSION:  Acute fractures of the left seventh through 10th transverse processes and   adjacent 8 through 10th proximal ribs. Extrapleural hematoma is   associated with the rib fractures with active bleeding as described on   recent chest CT.  No additional fractures identified.  Imaging findings compatible with DISH    --- End of Report ---            GEN ALBERT MD; Attending Radiologist  This document has been electronically signed. Aug 16 2024 11:22AM    < end of copied text >      PAST MEDICAL & SURGICAL HISTORY:  HTN (hypertension)      Afib      Chronic constipation      Insomnia      Hyperlipidemia      Anxiety and depression      Mild cognitive impairment      No significant past surgical history

## 2024-08-18 LAB
ANION GAP SERPL CALC-SCNC: 13 MMOL/L — SIGNIFICANT CHANGE UP (ref 5–17)
BASOPHILS # BLD AUTO: 0 K/UL — SIGNIFICANT CHANGE UP (ref 0–0.2)
BASOPHILS NFR BLD AUTO: 0 % — SIGNIFICANT CHANGE UP (ref 0–2)
BUN SERPL-MCNC: 25.7 MG/DL — HIGH (ref 8–20)
BURR CELLS BLD QL SMEAR: PRESENT — SIGNIFICANT CHANGE UP
CALCIUM SERPL-MCNC: 7.6 MG/DL — LOW (ref 8.4–10.5)
CHLORIDE SERPL-SCNC: 102 MMOL/L — SIGNIFICANT CHANGE UP (ref 96–108)
CO2 SERPL-SCNC: 22 MMOL/L — SIGNIFICANT CHANGE UP (ref 22–29)
CREAT SERPL-MCNC: 1.14 MG/DL — SIGNIFICANT CHANGE UP (ref 0.5–1.3)
EGFR: 61 ML/MIN/1.73M2 — SIGNIFICANT CHANGE UP
EOSINOPHIL # BLD AUTO: 0.19 K/UL — SIGNIFICANT CHANGE UP (ref 0–0.5)
EOSINOPHIL NFR BLD AUTO: 1.8 % — SIGNIFICANT CHANGE UP (ref 0–6)
GIANT PLATELETS BLD QL SMEAR: PRESENT — SIGNIFICANT CHANGE UP
GLUCOSE SERPL-MCNC: 109 MG/DL — HIGH (ref 70–99)
HCT VFR BLD CALC: 23 % — LOW (ref 39–50)
HCT VFR BLD CALC: 27 % — LOW (ref 39–50)
HGB BLD-MCNC: 7.8 G/DL — LOW (ref 13–17)
HGB BLD-MCNC: 9.3 G/DL — LOW (ref 13–17)
LYMPHOCYTES # BLD AUTO: 1.53 K/UL — SIGNIFICANT CHANGE UP (ref 1–3.3)
LYMPHOCYTES # BLD AUTO: 14.9 % — SIGNIFICANT CHANGE UP (ref 13–44)
MAGNESIUM SERPL-MCNC: 1.9 MG/DL — SIGNIFICANT CHANGE UP (ref 1.6–2.6)
MANUAL SMEAR VERIFICATION: SIGNIFICANT CHANGE UP
MCHC RBC-ENTMCNC: 30.9 PG — SIGNIFICANT CHANGE UP (ref 27–34)
MCHC RBC-ENTMCNC: 31 PG — SIGNIFICANT CHANGE UP (ref 27–34)
MCHC RBC-ENTMCNC: 33.9 GM/DL — SIGNIFICANT CHANGE UP (ref 32–36)
MCHC RBC-ENTMCNC: 34.4 GM/DL — SIGNIFICANT CHANGE UP (ref 32–36)
MCV RBC AUTO: 89.7 FL — SIGNIFICANT CHANGE UP (ref 80–100)
MCV RBC AUTO: 91.3 FL — SIGNIFICANT CHANGE UP (ref 80–100)
MONOCYTES # BLD AUTO: 0.63 K/UL — SIGNIFICANT CHANGE UP (ref 0–0.9)
MONOCYTES NFR BLD AUTO: 6.1 % — SIGNIFICANT CHANGE UP (ref 2–14)
NEUTROPHILS # BLD AUTO: 7.94 K/UL — HIGH (ref 1.8–7.4)
NEUTROPHILS NFR BLD AUTO: 77.2 % — HIGH (ref 43–77)
PHOSPHATE SERPL-MCNC: 1.8 MG/DL — LOW (ref 2.4–4.7)
PLAT MORPH BLD: NORMAL — SIGNIFICANT CHANGE UP
PLATELET # BLD AUTO: 109 K/UL — LOW (ref 150–400)
PLATELET # BLD AUTO: 88 K/UL — LOW (ref 150–400)
POIKILOCYTOSIS BLD QL AUTO: SIGNIFICANT CHANGE UP
POLYCHROMASIA BLD QL SMEAR: SLIGHT — SIGNIFICANT CHANGE UP
POTASSIUM SERPL-MCNC: 3.8 MMOL/L — SIGNIFICANT CHANGE UP (ref 3.5–5.3)
POTASSIUM SERPL-SCNC: 3.8 MMOL/L — SIGNIFICANT CHANGE UP (ref 3.5–5.3)
RBC # BLD: 2.52 M/UL — LOW (ref 4.2–5.8)
RBC # BLD: 3.01 M/UL — LOW (ref 4.2–5.8)
RBC # FLD: 14 % — SIGNIFICANT CHANGE UP (ref 10.3–14.5)
RBC # FLD: 14.3 % — SIGNIFICANT CHANGE UP (ref 10.3–14.5)
RBC BLD AUTO: ABNORMAL
SODIUM SERPL-SCNC: 137 MMOL/L — SIGNIFICANT CHANGE UP (ref 135–145)
WBC # BLD: 10.29 K/UL — SIGNIFICANT CHANGE UP (ref 3.8–10.5)
WBC # BLD: 11.78 K/UL — HIGH (ref 3.8–10.5)
WBC # FLD AUTO: 10.29 K/UL — SIGNIFICANT CHANGE UP (ref 3.8–10.5)
WBC # FLD AUTO: 11.78 K/UL — HIGH (ref 3.8–10.5)

## 2024-08-18 PROCEDURE — 99232 SBSQ HOSP IP/OBS MODERATE 35: CPT | Mod: FS

## 2024-08-18 PROCEDURE — 99232 SBSQ HOSP IP/OBS MODERATE 35: CPT

## 2024-08-18 PROCEDURE — 71045 X-RAY EXAM CHEST 1 VIEW: CPT | Mod: 26

## 2024-08-18 RX ORDER — POTASSIUM CHLORIDE 10 MEQ
20 TABLET, EXT RELEASE, PARTICLES/CRYSTALS ORAL ONCE
Refills: 0 | Status: COMPLETED | OUTPATIENT
Start: 2024-08-18 | End: 2024-08-18

## 2024-08-18 RX ORDER — DEXMEDETOMIDINE HYDROCHLORIDE IN 0.9% SODIUM CHLORIDE 4 UG/ML
0.05 INJECTION INTRAVENOUS
Qty: 200 | Refills: 0 | Status: DISCONTINUED | OUTPATIENT
Start: 2024-08-18 | End: 2024-08-18

## 2024-08-18 RX ORDER — HEPARIN SODIUM,BOVINE 1000/ML
5000 VIAL (ML) INJECTION EVERY 8 HOURS
Refills: 0 | Status: DISCONTINUED | OUTPATIENT
Start: 2024-08-18 | End: 2024-08-19

## 2024-08-18 RX ORDER — SODIUM PHOSPHATE, MONOBASIC, MONOHYDRATE AND SODIUM PHOSPHATE, DIBASIC ANHYDROUS 276; 142 MG/ML; MG/ML
30 INJECTION, SOLUTION INTRAVENOUS ONCE
Refills: 0 | Status: COMPLETED | OUTPATIENT
Start: 2024-08-18 | End: 2024-08-18

## 2024-08-18 RX ADMIN — ACETAMINOPHEN 1000 MILLIGRAM(S): 325 TABLET ORAL at 10:04

## 2024-08-18 RX ADMIN — Medication 50 MILLILITER(S): at 13:54

## 2024-08-18 RX ADMIN — Medication 20 MILLIGRAM(S): at 21:07

## 2024-08-18 RX ADMIN — Medication 2 TABLET(S): at 21:07

## 2024-08-18 RX ADMIN — ACETAMINOPHEN 400 MILLIGRAM(S): 325 TABLET ORAL at 17:21

## 2024-08-18 RX ADMIN — ACETAMINOPHEN 400 MILLIGRAM(S): 325 TABLET ORAL at 09:04

## 2024-08-18 RX ADMIN — TAMSULOSIN HYDROCHLORIDE 0.4 MILLIGRAM(S): 0.4 CAPSULE ORAL at 21:07

## 2024-08-18 RX ADMIN — CHLORHEXIDINE GLUCONATE 1 APPLICATION(S): 40 SOLUTION TOPICAL at 11:32

## 2024-08-18 RX ADMIN — Medication 20 MILLIEQUIVALENT(S): at 05:48

## 2024-08-18 RX ADMIN — METOPROLOL TARTRATE 50 MILLIGRAM(S): 100 TABLET ORAL at 05:47

## 2024-08-18 RX ADMIN — Medication 2 PATCH: at 11:31

## 2024-08-18 RX ADMIN — Medication 2 PATCH: at 20:18

## 2024-08-18 RX ADMIN — ACETAMINOPHEN 1000 MILLIGRAM(S): 325 TABLET ORAL at 18:21

## 2024-08-18 RX ADMIN — METOPROLOL TARTRATE 50 MILLIGRAM(S): 100 TABLET ORAL at 17:22

## 2024-08-18 RX ADMIN — Medication 2 PATCH: at 23:46

## 2024-08-18 RX ADMIN — Medication 100 GRAM(S): at 05:48

## 2024-08-18 RX ADMIN — POLYETHYLENE GLYCOL 3350 17 GRAM(S): 17 POWDER, FOR SOLUTION ORAL at 11:32

## 2024-08-18 RX ADMIN — SODIUM PHOSPHATE, MONOBASIC, MONOHYDRATE AND SODIUM PHOSPHATE, DIBASIC ANHYDROUS 85 MILLIMOLE(S): 276; 142 INJECTION, SOLUTION INTRAVENOUS at 09:03

## 2024-08-18 RX ADMIN — Medication 5000 UNIT(S): at 21:07

## 2024-08-18 NOTE — PROGRESS NOTE ADULT - SUBJECTIVE AND OBJECTIVE BOX
HPI: 89M PMH AFib on Eliquis, HTN, BPH, mild cognitive impairment, presents as a fall. He fell earlier this morning in his assisted living facility and was on the floor for at least an hour (true time unknown) before an attendant found him. He was brought in for evaluation. He states he sometimes walks with a cane at home, thinks he was using it but 'slipped and fell'. Reports pain in back and ribs. Denies HS. Trauma surgery consulted, no trauma code activated.    In ED was tachycardic to the 120s, SBP was 160 and he was saturating well on RA. Eliquis and metoprolol last taken 6pm 8/15 (evening prior to presentation). CT scan demonstrated L 8th, 9th rib fractures and L T7-10 TP fractures, as well as a L pleural hematoma with active extravasation. He received 1.5L fluids in the ED.  (16 Aug 2024 11:47)    INTERVAL HPI/OVERNIGHT EVENTS:  89y Male seen lying comfortably in bed. Tolerating diet. Salguero in with clear urine. No acute complaints at this time.    Vital Signs Last 24 Hrs  T(C): 36.8 (18 Aug 2024 16:29), Max: 37.6 (18 Aug 2024 04:02)  T(F): 98.2 (18 Aug 2024 16:29), Max: 99.6 (18 Aug 2024 04:02)  HR: 113 (18 Aug 2024 17:00) (89 - 119)  BP: 153/70 (18 Aug 2024 17:00) (114/68 - 164/80)  BP(mean): 94 (18 Aug 2024 17:00) (68 - 111)  RR: 18 (18 Aug 2024 17:00) (13 - 23)  SpO2: 96% (18 Aug 2024 17:00) (93% - 100%)    Parameters below as of 18 Aug 2024 17:00  Patient On (Oxygen Delivery Method): room air        PHYSICAL EXAM:  GENERAL: NAD, well-groomed  HEAD:  Atraumatic, normocephalic  NETO COMA SCORE: E- V- M- = 15  MENTAL STATUS: AAO x3; Awake; Opens eyes spontaneously; Appropriately conversant without aphasia; following simple commands  CRANIAL NERVES: PERRL. EOMI without nystagmus. Face symmetric w/ normal eye closure and smile, tongue midline. Hearing grossly intact. Speech clear. Head turning and shoulder shrug intact.   MOTOR: strength 5/5 b/l upper and lower extremities  EXTREMITIES: no clubbing, cyanosis, or edema  SKIN: Warm, dry    LABS:                        9.3    11.78 )-----------( 109      ( 18 Aug 2024 11:19 )             27.0     08-18    137  |  102  |  25.7<H>  ----------------------------<  109<H>  3.8   |  22.0  |  1.14    Ca    7.6<L>      18 Aug 2024 02:50  Phos  1.8     08-18  Mg     1.9     08-18    TPro  5.6<L>  /  Alb  3.3  /  TBili  1.2  /  DBili  x   /  AST  29  /  ALT  14  /  AlkPhos  82  08-16    PT/INR - ( 17 Aug 2024 02:00 )   PT: 13.8 sec;   INR: 1.25 ratio         PTT - ( 17 Aug 2024 02:00 )  PTT:30.2 sec  Urinalysis Basic - ( 18 Aug 2024 02:50 )    Color: x / Appearance: x / SG: x / pH: x  Gluc: 109 mg/dL / Ketone: x  / Bili: x / Urobili: x   Blood: x / Protein: x / Nitrite: x   Leuk Esterase: x / RBC: x / WBC x   Sq Epi: x / Non Sq Epi: x / Bacteria: x        08-17 @ 07:01 - 08-18 @ 07:00  --------------------------------------------------------  IN: 2300 mL / OUT: 1170 mL / NET: 1130 mL    08-18 @ 07:01  -  08-18 @ 18:00  --------------------------------------------------------  IN: 1449.8 mL / OUT: 605 mL / NET: 844.8 mL        RADIOLOGY & ADDITIONAL TESTS:    < from: CT Lumbar Spine Reform No Cont (08.16.24 @ 10:18) >  IMPRESSION:  Acute fractures of the left seventh through 10th transverse processes and   adjacent 8 through 10th proximal ribs. Extrapleural hematoma is   associated with the rib fractures with active bleeding as described on   recent chest CT.  No additional fractures identified.  Imaging findings compatible with DISH

## 2024-08-18 NOTE — DIETITIAN INITIAL EVALUATION ADULT - ETIOLOGY
Related to inadequate protein energy intake with persistent lack of appetite in setting of advance age, now s/p fall with fx's

## 2024-08-18 NOTE — PROGRESS NOTE ADULT - ASSESSMENT
This is a 89ym living in an assistant living facility with mild cognitive impairment, HTN, Afib on Xarelto. Posterior t-p fracture of the left side 7 thru 10th.     Plan  - Admitted to SICU  - Multiple left sided TP fractures 7th - 10th  - MRI of the thoracic/lumbar spine recommended if pt can tolerate  - Dr Swanson reviewed films and exam with staff and recommended as above  - Further plan per primary team  - Discussed with neurosurgeon on call Dr. Saldaña

## 2024-08-18 NOTE — PROGRESS NOTE ADULT - SUBJECTIVE AND OBJECTIVE BOX
Subjective:  Patient seen and examined, for pleural hematoma s/p fall with left rib fractures. Patient OOB sitting in the chair, in NAD. Patient endorses some pain and discomfort to his left side. "My breathing feels fine". He denies chest pain, shortness of breath, palpitations, headache, dizziness, nausea, or vomiting.     PAST MEDICAL & SURGICAL HISTORY:  HTN (hypertension)    Afib    Chronic constipation    Insomnia    Hyperlipidemia    Anxiety and depression    Mild cognitive impairment    No significant past surgical history       VITAL SIGNS  Vital Signs Last 24 Hrs  T(C): 37.6 (08-18-24 @ 04:02), Max: 37.6 (08-18-24 @ 04:02)  T(F): 99.6 (08-18-24 @ 04:02), Max: 99.6 (08-18-24 @ 04:02)  HR: 95 (08-18-24 @ 08:00) (89 - 116)  BP: 133/80 (08-18-24 @ 08:00) (101/58 - 155/72)  RR: 20 (08-18-24 @ 08:00) (14 - 23)  SpO2: 96% (08-18-24 @ 08:00) (92% - 100%)  on (O2)              MEDICATIONS  acetaminophen   IVPB .. 1000 milliGRAM(s) IV Intermittent every 6 hours PRN  artificial  tears Solution 1 Drop(s) Both EYES every 6 hours PRN  atorvastatin 20 milliGRAM(s) Oral at bedtime  chlorhexidine 2% Cloths 1 Application(s) Topical daily  lidocaine   4% Patch 2 Patch Transdermal daily  metoprolol tartrate 50 milliGRAM(s) Oral two times a day  multiple electrolytes Injection Type 1 1000 milliLiter(s) IV Continuous <Continuous>  polyethylene glycol 3350 17 Gram(s) Oral daily  senna 2 Tablet(s) Oral at bedtime  tamsulosin 0.4 milliGRAM(s) Oral at bedtime      08-17 @ 07:01  -  08-18 @ 07:00  --------------------------------------------------------  IN: 2300 mL / OUT: 1170 mL / NET: 1130 mL    08-18 @ 07:01  -  08-18 @ 10:21  --------------------------------------------------------  IN: 200 mL / OUT: 275 mL / NET: -75 mL      Weights:  Daily     Daily   Admit Wt: Drug Dosing Weight  Height (cm): 170.2 (16 Aug 2024 12:00)  Weight (kg): 85.8 (16 Aug 2024 12:00)  BMI (kg/m2): 29.6 (16 Aug 2024 12:00)  BSA (m2): 1.97 (16 Aug 2024 12:00)    LABS  08-18    137  |  102  |  25.7<H>  ----------------------------<  109<H>  3.8   |  22.0  |  1.14    Ca    7.6<L>      18 Aug 2024 02:50  Phos  1.8     08-18  Mg     1.9     08-18    TPro  5.6<L>  /  Alb  3.3  /  TBili  1.2  /  DBili  x   /  AST  29  /  ALT  14  /  AlkPhos  82  08-16                                 7.8    10.29 )-----------( 88       ( 18 Aug 2024 02:50 )             23.0          PT/INR - ( 17 Aug 2024 02:00 )   PT: 13.8 sec;   INR: 1.25 ratio         PTT - ( 17 Aug 2024 02:00 )  PTT:30.2 sec    DIAGNOSTICS:  All laboratory results, radiology and medications reviewed.    PHYSICAL EXAM  General: NAD  Neurology: Awake, nonfocal, DOYLE x 4  Respiratory: diminished on the left, No wheezing, rales, rhonchi  CV: RRR, S1S2, no murmurs, rubs or gallops  Abdominal: Soft, NT, ND +BS  Extremities: No edema, + peripheral pulses

## 2024-08-18 NOTE — PROGRESS NOTE ADULT - ASSESSMENT
89 year old male with h/o AFib on Eliquis, HTN, BPH, mild cognitive impairment, presents to [facility] ED from [facility] s/p mechanical fall.  Pt was on floor for at least one hour before being found by staff.  On CT Chest with IV Contrast, pt with multifragmented, displaced posterior/medial left 8th-9th rib, left T7 - T10 transverse process fractures with associated large pleural hematomas with active extravasation/hemorrhage. Because of loculated/masslike configuration of lower LEFT thoracic hematomas, extrapleural hematomas, insinuated between lower LEFT parietal pleura and intercostal muscles is an alternate possibility.  Pt admitted to Trauma service.  Pt receiving KCentra 2/2 recent Eliquis.  Thoracic Surgery consulted for active extravasation seen on CT.

## 2024-08-18 NOTE — CHART NOTE - NSCHARTNOTEFT_GEN_A_CORE
Patient was measured fit and delivered a TLSO rigid posterior panel extending from the sacrococcygeal junction to the scapular spine, The orthosis will stabilize and control the spine, reduce pain and ROM, allow for safer ambulation and assist in the healing process. Care use and function were explained to the patient and his daughter. Contact info was provided. All went without incident.   [facility]  [phone]

## 2024-08-18 NOTE — DIETITIAN INITIAL EVALUATION ADULT - PERTINENT LABORATORY DATA
08-18    137  |  102  |  25.7<H>  ----------------------------<  109<H>  3.8   |  22.0  |  1.14    Ca    7.6<L>      18 Aug 2024 02:50  Phos  1.8     08-18  Mg     1.9     08-18    TPro  5.6<L>  /  Alb  3.3  /  TBili  1.2  /  DBili  x   /  AST  29  /  ALT  14  /  AlkPhos  82  08-16

## 2024-08-18 NOTE — PROGRESS NOTE ADULT - ASSESSMENT
89-year old s/p fall with left sided rib fractures, transverse process fx, and extrapleural hematoma / hemothorax   8/18: No acute events. Hgb relatively stable. Afib with RVR improving with PO metoprolol     NEURO:   #acute post traumatic pain   - multimodal pain regimen    #delirium   - delirium precautions     CARDIO:   #afib RVR, hypotension, hx of hypertension   - hold amlodipine and ace inhibitor   - cw PO metoprolol. PRN IV metoprolol   - ECHO 60%     #Hyperlipidemia: c/w statin.     PULM:   #rib fractures, TP fractures, hemothorax/pleural hematoma   - PIC protocol   - pain management and consider rib block   - CT surgery consulted: no acute surgical intervention     GI: c/w diet as tolerated     RENAL:   #Urinary Retention, metabolic/lactic acidosis, LENARD   - volume resuscitation. Will decreased IVF   - Maintain jarrell   - Started on flomax will trial of void in 1-2 days   - LENARD resolving     #Hypophosphatemia: trend and replete with IV/Oral phosphate.     HEME:   #At risk for DVT: SCD + Hold HSQ   #acute blood loss anemia: trend and transfuse for HGB <7   - s/p kecentra   - 1 unit PRBC       #thrombocytopenia: monitor     ID: afebrile/ mild leukocytosis  - Trend CBC     ENDO:  Maintain euglycemia     LINES/DRAINS/AIRWAY: PIV     CODE STATUS: DNR, trial of intubation     DISPOSITION: SICU. Likely downgrade tomorrow. Family member updated at bedside

## 2024-08-18 NOTE — DIETITIAN INITIAL EVALUATION ADULT - PERTINENT MEDS FT
MEDICATIONS  (STANDING):  atorvastatin 20 milliGRAM(s) Oral at bedtime  chlorhexidine 2% Cloths 1 Application(s) Topical daily  lidocaine   4% Patch 2 Patch Transdermal daily  metoprolol tartrate 50 milliGRAM(s) Oral two times a day  multiple electrolytes Injection Type 1 1000 milliLiter(s) (100 mL/Hr) IV Continuous <Continuous>  polyethylene glycol 3350 17 Gram(s) Oral daily  senna 2 Tablet(s) Oral at bedtime  tamsulosin 0.4 milliGRAM(s) Oral at bedtime    MEDICATIONS  (PRN):  acetaminophen   IVPB .. 1000 milliGRAM(s) IV Intermittent every 6 hours PRN Mild Pain (1 - 3)  artificial  tears Solution 1 Drop(s) Both EYES every 6 hours PRN Dry Eyes

## 2024-08-18 NOTE — DIETITIAN INITIAL EVALUATION ADULT - OTHER INFO
Pt is a 89 year old M PMH AFib on Eliquis, HTN, BPH, mild cognitive impairment, presents as a fall. He fell earlier this morning in his assisted living facility and was on the floor for at least an hour (true time unknown) before an attendant found him. He was brought in for evaluation. He states he sometimes walks with a cane at home, thinks he was using it but 'slipped and fell'. Reports pain in back and ribs. Denies HS. Trauma surgery consulted, no trauma code activated.  Pt admitted with left sided rib fractures, transverse process fx, and extrapleural hematoma / hemothorax

## 2024-08-18 NOTE — PROGRESS NOTE ADULT - SUBJECTIVE AND OBJECTIVE BOX
Interval events: Included in assessment and plan    Objective   T(C): 36.7 (08-18-24 @ 20:14), Max: 37.6 (08-18-24 @ 04:02)  HR: 115 (08-18-24 @ 21:00) (94 - 119)  BP: 144/71 (08-18-24 @ 21:00) (116/78 - 177/78)  RR: 21 (08-18-24 @ 21:00) (13 - 23)  SpO2: 94% (08-18-24 @ 21:00) (93% - 100%)      08-17-24 @ 07:01  -  08-18-24 @ 07:00  --------------------------------------------------------  IN: 2300 mL / OUT: 1170 mL / NET: 1130 mL    08-18-24 @ 07:01  -  08-18-24 @ 21:57  --------------------------------------------------------  IN: 1649.8 mL / OUT: 705 mL / NET: 944.8 mL        CURRENT MEDS:   artificial  tears Solution 1 Drop(s) Both EYES every 6 hours PRN  atorvastatin 20 milliGRAM(s) Oral at bedtime  chlorhexidine 2% Cloths 1 Application(s) Topical daily  heparin   Injectable 5000 Unit(s) SubCutaneous every 8 hours  lidocaine   4% Patch 2 Patch Transdermal daily  metoprolol tartrate 50 milliGRAM(s) Oral two times a day  multiple electrolytes Injection Type 1 1000 milliLiter(s) IV Continuous <Continuous>  polyethylene glycol 3350 17 Gram(s) Oral daily  senna 2 Tablet(s) Oral at bedtime  tamsulosin 0.4 milliGRAM(s) Oral at bedtime      HOME MEDS:  amLODIPine 5 mg oral tablet: 1 tab(s) orally once a day  Colace 100 mg oral capsule: 2 cap(s) orally once a day (at bedtime)  Eliquis 5 mg oral tablet: 1 tab(s) orally 2 times a day  furosemide 20 mg oral tablet: 1 tab(s) orally once a day  Melatonin 3 mg oral tablet: 1 tab(s) orally once a day (at bedtime)  metoprolol tartrate 50 mg oral tablet: 1 tab(s) orally 2 times a day  Polyethylene Glycol 3350: 17 gram(s) orally once a day  pravastatin 80 mg oral tablet: 1 tab(s) orally once a day  ramipril 10 mg oral tablet: orally once a day  senna (sennosides) 8.6 mg oral tablet: 1 tab(s) orally once a day  venlafaxine 37.5 mg oral tablet: 1 tab(s) orally once a day    PHYSICAL EXAM     GENERAL: NAD, Resting in bed  HEENT:  Head atraumatic, Moist mucous membranes. No JVD   CHEST/LUNG: Symmetric and unlabored respirations  HEART: Baseline rhythm with palpable distal pulses   ABDOMEN: Abdomen soft nontender.   EXTREMITIES:  No clubbing, cyanosis, or edema  NERVOUS SYSTEM:  Alert & Oriented X3, non-focal and spontaneous movements of all extremities  SKIN: No rashes or lesions

## 2024-08-19 ENCOUNTER — TRANSCRIPTION ENCOUNTER (OUTPATIENT)
Age: 89
End: 2024-08-19

## 2024-08-19 DIAGNOSIS — I48.91 UNSPECIFIED ATRIAL FIBRILLATION: ICD-10-CM

## 2024-08-19 LAB
ANION GAP SERPL CALC-SCNC: 15 MMOL/L — SIGNIFICANT CHANGE UP (ref 5–17)
BUN SERPL-MCNC: 18.9 MG/DL — SIGNIFICANT CHANGE UP (ref 8–20)
CALCIUM SERPL-MCNC: 8.1 MG/DL — LOW (ref 8.4–10.5)
CHLORIDE SERPL-SCNC: 101 MMOL/L — SIGNIFICANT CHANGE UP (ref 96–108)
CO2 SERPL-SCNC: 20 MMOL/L — LOW (ref 22–29)
CREAT SERPL-MCNC: 0.92 MG/DL — SIGNIFICANT CHANGE UP (ref 0.5–1.3)
EGFR: 80 ML/MIN/1.73M2 — SIGNIFICANT CHANGE UP
GLUCOSE SERPL-MCNC: 107 MG/DL — HIGH (ref 70–99)
HCT VFR BLD CALC: 28.3 % — LOW (ref 39–50)
HGB BLD-MCNC: 9.5 G/DL — LOW (ref 13–17)
MAGNESIUM SERPL-MCNC: 2 MG/DL — SIGNIFICANT CHANGE UP (ref 1.6–2.6)
MCHC RBC-ENTMCNC: 30.2 PG — SIGNIFICANT CHANGE UP (ref 27–34)
MCHC RBC-ENTMCNC: 33.6 GM/DL — SIGNIFICANT CHANGE UP (ref 32–36)
MCV RBC AUTO: 89.8 FL — SIGNIFICANT CHANGE UP (ref 80–100)
PHOSPHATE SERPL-MCNC: 2.2 MG/DL — LOW (ref 2.4–4.7)
PLATELET # BLD AUTO: 120 K/UL — LOW (ref 150–400)
POTASSIUM SERPL-MCNC: 3.8 MMOL/L — SIGNIFICANT CHANGE UP (ref 3.5–5.3)
POTASSIUM SERPL-SCNC: 3.8 MMOL/L — SIGNIFICANT CHANGE UP (ref 3.5–5.3)
RBC # BLD: 3.15 M/UL — LOW (ref 4.2–5.8)
RBC # FLD: 13.8 % — SIGNIFICANT CHANGE UP (ref 10.3–14.5)
SODIUM SERPL-SCNC: 136 MMOL/L — SIGNIFICANT CHANGE UP (ref 135–145)
WBC # BLD: 11.74 K/UL — HIGH (ref 3.8–10.5)
WBC # FLD AUTO: 11.74 K/UL — HIGH (ref 3.8–10.5)

## 2024-08-19 PROCEDURE — 99223 1ST HOSP IP/OBS HIGH 75: CPT | Mod: FS

## 2024-08-19 PROCEDURE — 99232 SBSQ HOSP IP/OBS MODERATE 35: CPT

## 2024-08-19 PROCEDURE — 99231 SBSQ HOSP IP/OBS SF/LOW 25: CPT

## 2024-08-19 PROCEDURE — 73600 X-RAY EXAM OF ANKLE: CPT | Mod: 26,RT

## 2024-08-19 PROCEDURE — 71045 X-RAY EXAM CHEST 1 VIEW: CPT | Mod: 26

## 2024-08-19 RX ORDER — VENLAFAXINE HYDROCHLORIDE 150 MG/1
37.5 CAPSULE, EXTENDED RELEASE ORAL DAILY
Refills: 0 | Status: DISCONTINUED | OUTPATIENT
Start: 2024-08-19 | End: 2024-08-21

## 2024-08-19 RX ORDER — SODIUM PHOSPHATE, DIBASIC, ANHYDROUS, POTASSIUM PHOSPHATE, MONOBASIC, AND SODIUM PHOSPHATE, MONOBASIC, MONOHYDRATE 852; 155; 130 MG/1; MG/1; MG/1
2 TABLET, COATED ORAL ONCE
Refills: 0 | Status: COMPLETED | OUTPATIENT
Start: 2024-08-19 | End: 2024-08-19

## 2024-08-19 RX ORDER — METOPROLOL TARTRATE 100 MG/1
5 TABLET ORAL ONCE
Refills: 0 | Status: COMPLETED | OUTPATIENT
Start: 2024-08-19 | End: 2024-08-19

## 2024-08-19 RX ORDER — AMLODIPINE BESYLATE 10 MG/1
5 TABLET ORAL DAILY
Refills: 0 | Status: DISCONTINUED | OUTPATIENT
Start: 2024-08-19 | End: 2024-08-19

## 2024-08-19 RX ORDER — DILTIAZEM HYDROCHLORIDE 5 MG/ML
30 INJECTION INTRAVENOUS EVERY 6 HOURS
Refills: 0 | Status: DISCONTINUED | OUTPATIENT
Start: 2024-08-19 | End: 2024-08-21

## 2024-08-19 RX ORDER — ENOXAPARIN SODIUM 100 MG/ML
30 INJECTION SUBCUTANEOUS EVERY 12 HOURS
Refills: 0 | Status: DISCONTINUED | OUTPATIENT
Start: 2024-08-19 | End: 2024-08-21

## 2024-08-19 RX ADMIN — METOPROLOL TARTRATE 5 MILLIGRAM(S): 100 TABLET ORAL at 20:19

## 2024-08-19 RX ADMIN — Medication 2 PATCH: at 20:27

## 2024-08-19 RX ADMIN — Medication 2 PATCH: at 12:24

## 2024-08-19 RX ADMIN — Medication 5000 UNIT(S): at 14:14

## 2024-08-19 RX ADMIN — Medication 5 MILLIGRAM(S): at 23:39

## 2024-08-19 RX ADMIN — METOPROLOL TARTRATE 50 MILLIGRAM(S): 100 TABLET ORAL at 19:11

## 2024-08-19 RX ADMIN — METOPROLOL TARTRATE 50 MILLIGRAM(S): 100 TABLET ORAL at 05:03

## 2024-08-19 RX ADMIN — Medication 20 MILLIGRAM(S): at 21:01

## 2024-08-19 RX ADMIN — Medication 2 TABLET(S): at 21:01

## 2024-08-19 RX ADMIN — SODIUM PHOSPHATE, DIBASIC, ANHYDROUS, POTASSIUM PHOSPHATE, MONOBASIC, AND SODIUM PHOSPHATE, MONOBASIC, MONOHYDRATE 2 PACKET(S): 852; 155; 130 TABLET, COATED ORAL at 05:03

## 2024-08-19 RX ADMIN — TAMSULOSIN HYDROCHLORIDE 0.4 MILLIGRAM(S): 0.4 CAPSULE ORAL at 21:01

## 2024-08-19 RX ADMIN — Medication 5 MILLIGRAM(S): at 01:54

## 2024-08-19 RX ADMIN — DILTIAZEM HYDROCHLORIDE 30 MILLIGRAM(S): 5 INJECTION INTRAVENOUS at 23:39

## 2024-08-19 RX ADMIN — DILTIAZEM HYDROCHLORIDE 30 MILLIGRAM(S): 5 INJECTION INTRAVENOUS at 19:11

## 2024-08-19 RX ADMIN — AMLODIPINE BESYLATE 5 MILLIGRAM(S): 10 TABLET ORAL at 16:21

## 2024-08-19 RX ADMIN — POLYETHYLENE GLYCOL 3350 17 GRAM(S): 17 POWDER, FOR SOLUTION ORAL at 12:30

## 2024-08-19 RX ADMIN — VENLAFAXINE HYDROCHLORIDE 37.5 MILLIGRAM(S): 150 CAPSULE, EXTENDED RELEASE ORAL at 20:21

## 2024-08-19 RX ADMIN — CHLORHEXIDINE GLUCONATE 1 APPLICATION(S): 40 SOLUTION TOPICAL at 13:43

## 2024-08-19 RX ADMIN — Medication 5000 UNIT(S): at 05:04

## 2024-08-19 NOTE — CHART NOTE - NSCHARTNOTEFT_GEN_A_CORE
SUBJECTIVE / 24H EVENTS:    Pt seen and examined at bedside by the team. No acute complaints. Pt denies CP, SOB, N/V, fever, chills.     MEDICATIONS  (STANDING):  atorvastatin 20 milliGRAM(s) Oral at bedtime  chlorhexidine 2% Cloths 1 Application(s) Topical daily  diltiazem    Tablet 30 milliGRAM(s) Oral every 6 hours  heparin   Injectable 5000 Unit(s) SubCutaneous every 8 hours  lidocaine   4% Patch 2 Patch Transdermal daily  melatonin 5 milliGRAM(s) Oral at bedtime  metoprolol tartrate 50 milliGRAM(s) Oral two times a day  polyethylene glycol 3350 17 Gram(s) Oral daily  senna 2 Tablet(s) Oral at bedtime  tamsulosin 0.4 milliGRAM(s) Oral at bedtime  venlafaxine XR. 37.5 milliGRAM(s) Oral daily    MEDICATIONS  (PRN):  artificial  tears Solution 1 Drop(s) Both EYES every 6 hours PRN Dry Eyes      Vital Signs Last 24 Hrs  T(C): 36.6 (19 Aug 2024 16:01), Max: 36.9 (19 Aug 2024 07:00)  T(F): 97.8 (19 Aug 2024 16:01), Max: 98.4 (19 Aug 2024 07:00)  HR: 117 (19 Aug 2024 17:00) (97 - 144)  BP: 158/65 (19 Aug 2024 17:00) (124/87 - 177/78)  BP(mean): 93 (19 Aug 2024 17:00) (78 - 144)  RR: 22 (19 Aug 2024 17:00) (15 - 24)  SpO2: 94% (19 Aug 2024 17:00) (86% - 100%)    Parameters below as of 19 Aug 2024 12:00  Patient On (Oxygen Delivery Method): room air        Physical Exam  Constitutional: Confused, NAD  HEENT: NC/AT, EOMI  Respiratory: normal respiratory effort, no accessory muscle use, no conversational dyspnea  Cardiovascular: regular rate & rhythm  Gastrointestinal: abdomen is soft & non-distended, non-tender, no rebound tenderness / guarding  Neurological: GCS15  Musculoskeletal: DOYLE x 4 spontaneously, extremities are without point tenderness or deformity  Skin: mucous membranes moist, no diaphoresis, pallor, cyanosis or jaundice      I&O's Detail    18 Aug 2024 07:01  -  19 Aug 2024 07:00  --------------------------------------------------------  IN:    IV PiggyBack: 599.8 mL    multiple electrolytes Injection Type 1.: 1550 mL  Total IN: 2149.8 mL    OUT:    Indwelling Catheter - Urethral (mL): 485 mL    Voided (mL): 220 mL  Total OUT: 705 mL    Total NET: 1444.8 mL          LABS:                        9.5    11.74 )-----------( 120      ( 19 Aug 2024 03:30 )             28.3     08-19    136  |  101  |  18.9  ----------------------------<  107<H>  3.8   |  20.0<L>  |  0.92    Ca    8.1<L>      19 Aug 2024 03:30  Phos  2.2     08-19  Mg     2.0     08-19        Urinalysis Basic - ( 19 Aug 2024 03:30 )    Color: x / Appearance: x / SG: x / pH: x  Gluc: 107 mg/dL / Ketone: x  / Bili: x / Urobili: x   Blood: x / Protein: x / Nitrite: x   Leuk Esterase: x / RBC: x / WBC x   Sq Epi: x / Non Sq Epi: x / Bacteria: x      89M, PMH AFib on Eliquis, HTN, BPH, HLD, CKD, depression, mild cognitive impairment, presented on 8/16/24 after a fall at his assisted living facility, found to have Left 8 and 9 rib fractures, left T7-T10 transverse process fractures, and left extrapleural hematoma with active extravasation.  Eliquis was reversed with KCENTRA, 1u PRBC. No acute intervention as per Thoracic surgery. Patient is s/p IR angiography (no active bleeding noted) on 8/16/24. H&H has stabilized.  Course complicated by urinary retention, started on flomax, urinary retention now resolved, voiding freely.     Plan:  - For downgrade from SICU  - Continue to hold eliquis and lasix  - Cardio consult for recs on eliquis and lasix  - Continue home meds  - Continue TLSO for comfort  - Continue lidocaine patch for pain  - Geriatrics consult  - IS  - DVT ppx  - PT consult

## 2024-08-19 NOTE — PROGRESS NOTE ADULT - SUBJECTIVE AND OBJECTIVE BOX
Subjective: Patient saturating well on room air. Feeling well, no complaints.     Vital Signs:  Vital Signs Last 24 Hrs  T(C): 36.9 (08-19-24 @ 07:00), Max: 36.9 (08-19-24 @ 07:00)  T(F): 98.4 (08-19-24 @ 07:00), Max: 98.4 (08-19-24 @ 07:00)  HR: 103 (08-19-24 @ 10:00) (97 - 122)  BP: 168/80 (08-19-24 @ 10:00) (124/87 - 177/78)  RR: 22 (08-19-24 @ 10:00) (15 - 24)  SpO2: 98% (08-19-24 @ 09:00) (94% - 100%) on (O2)  I&O's Detail    18 Aug 2024 07:01  -  19 Aug 2024 07:00  --------------------------------------------------------  IN:    IV PiggyBack: 599.8 mL    multiple electrolytes Injection Type 1.: 1550 mL  Total IN: 2149.8 mL    OUT:    Indwelling Catheter - Urethral (mL): 485 mL    Voided (mL): 220 mL  Total OUT: 705 mL    Total NET: 1444.8 mL        General: NAD  Neurology: Awake, nonfocal  Respiratory: B/L BS, L lateral firmness with inferior bruising, US exam with small L pleural effusion   CV: S1S2    Relevant labs, radiology and Medications reviewed                        9.5    11.74 )-----------( 120      ( 19 Aug 2024 03:30 )             28.3     08-19    136  |  101  |  18.9  ----------------------------<  107<H>  3.8   |  20.0<L>  |  0.92    Ca    8.1<L>      19 Aug 2024 03:30  Phos  2.2     08-19  Mg     2.0     08-19        MEDICATIONS  (STANDING):  atorvastatin 20 milliGRAM(s) Oral at bedtime  chlorhexidine 2% Cloths 1 Application(s) Topical daily  heparin   Injectable 5000 Unit(s) SubCutaneous every 8 hours  lidocaine   4% Patch 2 Patch Transdermal daily  melatonin 5 milliGRAM(s) Oral at bedtime  metoprolol tartrate 50 milliGRAM(s) Oral two times a day  polyethylene glycol 3350 17 Gram(s) Oral daily  senna 2 Tablet(s) Oral at bedtime  tamsulosin 0.4 milliGRAM(s) Oral at bedtime    MEDICATIONS  (PRN):  artificial  tears Solution 1 Drop(s) Both EYES every 6 hours PRN Dry Eyes        Assessment  89y Male  w/ PAST MEDICAL & SURGICAL HISTORY:  HTN (hypertension)      Afib      Chronic constipation      Insomnia      Hyperlipidemia      Anxiety and depression      Mild cognitive impairment      No significant past surgical history      admitted with complaints of Patient is a 89y old  Male who presents with a chief complaint of Polytrauma (19 Aug 2024 10:31)

## 2024-08-19 NOTE — PROGRESS NOTE ADULT - TIME BILLING
as above    89 year old gentleman w/afib on Eliquis, HTN, BPH, mild cognitive impairment who was admitted 8/16/24 after a fall found to have L 8th, 9th rib fractures, L extrapleural hematoma w/active extravasation, L T7-T10 transverse process fractures, LENARD, urinary retention.      Neuro: Concern for developing confusion, daughter at bedside to reorient, pain adequate controlled on multimodal pain medications.  Cardiovascular: On home metoprolol, patient had been on apixaban prior to admission - patient recently moved from [facility] to the [facility].  Will attempt to re-engage prior Cardiologist for discussion regarding risks/benefits of continuing with ongoing full dose AC in setting of falls  Pulmonary: Now on RA, CXR shows extra pleural collection -possibly a little worse today, still w/no evidence of pulmonary compromise, appreciate thoracic surgery input  Heme: H/H remains stable, improved appropriately after transfusion of 1UPRBC  GI: Diet as tolerated, bowel regimen  : Now voiding, Cr continues to improve (LENARD earlier this admission), adequate urine output  ID: Afebrile, WBC stable at 11.78, no antibiotics  Fluids:  Fluids at 50/hr, would HLIV if tolerates adequate PO  Endocrine/Electrolytes: Replete phos  Lines: No deep lines  Code Status: DNR/Trial of intubation    Patient without acute critical care issues at this time.  Will transfer from critical care setting.  Discussed with patient's daughter, Sarah, at bedside.  Answered questions. as above    89 year old gentleman w/afib on Eliquis, HTN, BPH, mild cognitive impairment who was admitted 8/16/24 after a fall found to have L 8th, 9th rib fractures, L extrapleural hematoma w/active extravasation, L T7-T10 transverse process fractures, LENARD, urinary retention.      Neuro: Concern for developing confusion, daughter at bedside to reorient, pain adequate controlled on multimodal pain medications.  Cardiovascular: On home metoprolol, patient had been on apixaban prior to admission - patient recently moved from [facility] to the [facility].  Will attempt to re-engage prior Cardiologist for discussion regarding risks/benefits of continuing with ongoing full dose AC in setting of falls  Pulmonary: Now on RA, CXR shows extra pleural collection -possibly a little worse today, still w/no evidence of pulmonary compromise, appreciate thoracic surgery input  Heme: H/H remains stable, improved appropriately after transfusion of 1UPRBC, sqh - will transition to lovenox  GI: Diet as tolerated, bowel regimen  : Now voiding, Cr continues to improve (LENARD earlier this admission), adequate urine output  ID: Afebrile, WBC stable at 11.78, no antibiotics  Fluids:  Fluids at 50/hr, would HLIV if tolerates adequate PO  Endocrine/Electrolytes: Replete phos  Lines: No deep lines  Code Status: DNR/Trial of intubation    Patient without acute critical care issues at this time.  Will transfer from critical care setting.  Discussed with patient's daughter, Sarah, at bedside.  Answered questions.

## 2024-08-19 NOTE — DISCHARGE NOTE PROVIDER - DID THE PATIENT PRESENT WITH OR WAS TREATED FOR MALNUTRITION DURING THIS ADMISSION
ABDOMINAL PAIN      Abdominal pain can be caused by many different disease processes.  It is not always possible to make an exact diagnosis on an initial visit to the Emergency Department.  Your doctor has made every effort and given thoughtful consideration to your current symptoms and does not feel your abdominal pain is caused by a serious problem which would threaten your life.      However, there may be a change within the next 12-24 hours and re-evaluation will be necessary.      Therefore, Return to the Emergency Department immediately should any of the following signs or symptoms occur:    1. Pain becomes worse or lasts longer than 24-48 hours  2. If pain settles in one particular place  3. Vomiting or total lack of appetite for greater than 12 hours  4. You vomit blood or pass blood in urine or bowel movements  5. Shaking chills or high fever ( temperature of 101°F or greater) develops or fails to go away in 24-48 hours  6. Change in color of skin, eyes, urine or bowel movement  7. Unable to pass gas or have a bowel movement  8. Abdomen becomes swollen or no bowel movement  9. Condition fails to improve as expected  10. Unable to keep down clear liquids (Do not attempt to eat solid food for 12-24 hours)  11. Fainting  12.     You develop any new concerning symptoms  13.    Heavy vaginal bleeding   
Yes...

## 2024-08-19 NOTE — CONSULT NOTE ADULT - TIME BILLING
critical thinking medical chart, avoiding duplicate testing,  evaluate for cardiac cause , r/o myocardial infarction , r/o arrhythmias, r/o peripheral vascular disease, r/o heart failure, r/o cerebrovascular disease

## 2024-08-19 NOTE — CONSULT NOTE ADULT - SUBJECTIVE AND OBJECTIVE BOX
[facility]                                              CARDIOLOGY AT [place] [address]                                             Telephone: [phone]. Fax:[phone]                                                       CARDIOLOGY CONSULTATION NOTE                                                                                             History obtained by: Patient and medical record  Community Cardiologist: Dr. Colindres for the last few years, prior to 2020 Dr. Zapata    obtained: Yes [  ] No [ X ]  Reason for Consultation: Atrial Fibrillation  Available out pt records reviewed: Yes [X  ] No [  ]    Chief complaint:    Patient is a 89y old  Male who presents with a chief complaint of Polytrauma (19 Aug 2024 11:35)      HPI: Patient is a 89 y/o M with a PMHx of Atrial fibrillation (on Eliquis), dilated aortic root, HTN, HLD, and mild cognitive impairment who presented to [facility] from assisted living facility after an unwitnessed fall. Patient is currently very confused, unable to provide a full ROS. Per chart patient was found on the floor of his assisted living facility after reporting that he "slipped and fell." Unclear how long patient was down on the floor, but also unclear the circumstances. Patient was found upon arrival to be in Afib with RVR, with CT scan revealing L 8th, 9th rib fractures and L T7-10 TP fractures, as well as a L pleural hematoma with active extravasation. Patient was admitted to the SICU where he was also sent to IR for a left chest angiogram, but no active bleeding noted therefore no embolizations performed. Patient has been continuing to progress, and has since downgraded out of the ICU. Patient also noted to be impulsive while in the ICU, often trying to get out of bed and move out of the chair without assistance. Patient is unable to provide detailed ROS at this time.       CARDIAC TESTING   ECHO:  < from: TTE W or WO Ultrasound Enhancing Agent (08.16.24 @ 13:27) >  CONCLUSIONS:      1. Left ventricular cavity is small. Left ventricular systolic function is normal with an ejection fraction of 61 % by Devi's method of disks.   2. Analysis of left ventricular diastolic function and filling pressure is made challenging by the presence of atrial fibrillation.   3. Normal right ventricular cavity size and mildly reduced right ventricular systolic function.   4. Left atrium is moderately dilated.   5. Mild mitral regurgitation.   6. Trileaflet aortic valve with normal systolic excursion. There is focal calcification of the aortic valve leaflets.   7. Moderate aortic regurgitation.   8. Mild to moderate tricuspid regurgitation.   9. Estimated pulmonary artery systolic pressure is 33 mmHg.  10. No pericardial effusion seen.  11. Large left pleural effusion noted.    < end of copied text >    STRESS:  Outpatient NST 2019  No infarct or ischemia.     CATH:     ELECTROPHYSIOLOGY:     PAST MEDICAL HISTORY  HTN (hypertension)    Afib    Chronic constipation    Insomnia    Hyperlipidemia    Anxiety and depression    Mild cognitive impairment        PAST SURGICAL HISTORY  No significant past surgical history        SOCIAL HISTORY: Lives in an assisted living memory unit.  CIGARETTES:   Former smoker, quit >50 years ago  ALCOHOL: Denies  DRUGS: Denies    FAMILY HISTORY:  No pertinent family history in first degree relatives      Family History of Cardiovascular Disease:  Yes [  ] No [  ]  Coronary Artery Disease in first degree relative: Yes [  ] No [  ]  Sudden Cardiac Death in First degree relative: Yes [  ] No [  ]    HOME MEDICATIONS:  amLODIPine 5 mg oral tablet: 1 tab(s) orally once a day (16 Aug 2024 11:13)  Colace 100 mg oral capsule: 2 cap(s) orally once a day (at bedtime) (16 Aug 2024 11:13)  Eliquis 5 mg oral tablet: 1 tab(s) orally 2 times a day (16 Aug 2024 11:13)  furosemide 20 mg oral tablet: 1 tab(s) orally once a day (16 Aug 2024 11:13)  Melatonin 3 mg oral tablet: 1 tab(s) orally once a day (at bedtime) (16 Aug 2024 11:13)  metoprolol tartrate 50 mg oral tablet: 1 tab(s) orally 2 times a day (16 Aug 2024 11:13)  Polyethylene Glycol 3350: 17 gram(s) orally once a day (16 Aug 2024 11:13)  pravastatin 80 mg oral tablet: 1 tab(s) orally once a day (16 Aug 2024 11:13)  ramipril 10 mg oral tablet: orally once a day (16 Aug 2024 11:13)  senna (sennosides) 8.6 mg oral tablet: 1 tab(s) orally once a day (16 Aug 2024 11:13)  venlafaxine 37.5 mg oral tablet: 1 tab(s) orally once a day (16 Aug 2024 11:13)      CURRENT CARDIAC MEDICATIONS:  amLODIPine   Tablet 5 milliGRAM(s) Oral daily  metoprolol tartrate 50 milliGRAM(s) Oral two times a day      CURRENT OTHER MEDICATIONS:  melatonin 5 milliGRAM(s) Oral at bedtime  venlafaxine XR. 37.5 milliGRAM(s) Oral daily  polyethylene glycol 3350 17 Gram(s) Oral daily  senna 2 Tablet(s) Oral at bedtime  artificial  tears Solution 1 Drop(s) Both EYES every 6 hours PRN Dry Eyes  atorvastatin 20 milliGRAM(s) Oral at bedtime  chlorhexidine 2% Cloths 1 Application(s) Topical daily  heparin   Injectable 5000 Unit(s) SubCutaneous every 8 hours  lidocaine   4% Patch 2 Patch Transdermal daily  tamsulosin 0.4 milliGRAM(s) Oral at bedtime      ALLERGIES:   No Known Allergies      REVIEW OF SYMPTOMS: Unable to obtain       VITAL SIGNS:  T(C): 36.7 (08-19-24 @ 11:00), Max: 36.9 (08-19-24 @ 07:00)  T(F): 98.1 (08-19-24 @ 11:00), Max: 98.4 (08-19-24 @ 07:00)  HR: 128 (08-19-24 @ 14:00) (97 - 133)  BP: 163/87 (08-19-24 @ 14:00) (124/87 - 177/78)  RR: 23 (08-19-24 @ 14:00) (15 - 24)  SpO2: 98% (08-19-24 @ 14:00) (86% - 100%)    INTAKE AND OUTPUT:     08-18 @ 07:01  -  08-19 @ 07:00  --------------------------------------------------------  IN: 2149.8 mL / OUT: 705 mL / NET: 1444.8 mL        PHYSICAL EXAM:  Constitutional: Comfortable . No acute distress.   HEENT: Atraumatic and normocephalic , neck is supple . no JVD. No carotid bruit.  CNS: A&Ox3. No focal deficits.   Respiratory: Decreased BS at left base  Cardiovascular:  Tachycardic, irregularly irreuglar. No murmur. No rubs or gallop.  Gastrointestinal: Soft, non-tender. +Bowel sounds.   Extremities: 2+ Peripheral Pulses, No clubbing, cyanosis, or edema  Psychiatric: Calm . no agitation.   Skin: Warm and dry, no ulcers on extremities     LABS:                            9.5    11.74 )-----------( 120      ( 19 Aug 2024 03:30 )             28.3     08-19    136  |  101  |  18.9  ----------------------------<  107<H>  3.8   |  20.0<L>  |  0.92    Ca    8.1<L>      19 Aug 2024 03:30  Phos  2.2     08-19  Mg     2.0     08-19        Urinalysis Basic - ( 19 Aug 2024 03:30 )    Color: x / Appearance: x / SG: x / pH: x  Gluc: 107 mg/dL / Ketone: x  / Bili: x / Urobili: x   Blood: x / Protein: x / Nitrite: x   Leuk Esterase: x / RBC: x / WBC x   Sq Epi: x / Non Sq Epi: x / Bacteria: x              INTERPRETATION OF TELEMETRY: Afib with -160s    ECG: Afib with RVR NSST/T wave abnormalities  Prior ECG: Yes [  ] No [  ]    RADIOLOGY & ADDITIONAL STUDIES:    X-ray:    < from: Xray Chest 1 View- PORTABLE-Routine (Xray Chest 1 View- PORTABLE-Routine in AM.) (08.18.24 @ 04:17) >  INTERPRETATION:  AP chest radiograph    COMPARISON: Chest CT 8/16/2024.    CLINICAL INFORMATION: Pleural effusion/Hemothorax. Multiple LEFT rib   fractures  FINDINGS:  CATHETERS AND TUBES: None    PULMONARY:  Large LEFT pleural effusion extends to LEFT apex. RIGHT lung clear.    ..  No pleural effusion or pneumothorax.    HEART/VASCULAR: The heart size and size cannot be assessed due to   opacified LEFT lower hemithorax.  BONES: Left-sided fractures of maxillary delineated on plain film   radiography. Please refer to same day chest CT report.    LEFT transverse process fractures.  IMPRESSION:  Large LEFT pleural effusion/hemothorax.  LEFT rib fractures not evident on plain film radiography. Please refer to   8/16/2024 chest CT.  RIGHT lung clear.      < end of copied text >    CT scan:   < from: CT Thoracic Spine Reform No Cont (08.16.24 @ 09:46) >  THORACIC SPINE FINDINGS:  *  FRACTURES:  Acute fractures of the left seventh through 10th transverse processes and   adjacent eighth through 10th proximal ribs. Extrapleural hematomas   associated with the rib fractures with associated active bleeding as   described on recent chest CT.  *  ALIGNMENT:  Exaggeration of the normal thoracic kyphosis. Mild dextroscoliosis. No   subluxation.  *  SPINAL COLUMN:  Vertebral body heights are well-maintained. Large anterior bridging   osteophytes compatible with DISH. Calcification/partial fusion of the   T5-6 through T11-12 disc spaces.    LUMBAR SPINE FINDINGS:  *  FRACTURES:  No evidence of an acute lumbar spine fracture.  *  ALIGNMENT:  Grade 1 anterolisthesis L4 and L5 related to facet DJD.  *  SPINAL COLUMN:  Vertebral body heights are well-maintained. Endplate osteophytes are seen   throughout.  Narrowing of the L4-5 disc space.  *  DISC LEVELS:  T12/L1: No disc bulge or protrusion. Canal and neural foramina are patent  L1-2: Mild disc bulge. Mild facet degenerative changes. Mild canaland   bilateral neural foramina narrowing  L2-3: Mild disc bulge. Moderate right greater than left facet DJD.   Moderate canal and moderate right neural foramina narrowing. Mild left   neural foramina narrowing  L3-4: Mild bulge. Severe right and moderate left facet DJD. Moderate   canal, and moderate right greater than left neural foramina narrowing.  L4-5: Grade 1 anterolisthesis related to severe facet DJD. Mild canal and   moderate bilateral neural foramina narrowing.  L5-S1: No disc protrusion. Severe left and moderate right facet DJD.   Canal and neural foramina appear patent  *  OTHER:  Visualized sacrum appears intact.  Anterior bridging osteophytes about the sacroiliac joints    IMPRESSION:  Acute fractures of the left seventh through 10th transverse processes and   adjacent 8 through 10th proximal ribs. Extrapleural hematoma is   associated with the rib fractures with active bleeding as described on   recent chest CT.  No additional fractures identified.  Imaging findings compatible with DISH    --- End of Report ---        < end of copied text >    MRI:   US:

## 2024-08-19 NOTE — PHYSICAL THERAPY INITIAL EVALUATION ADULT - PERTINENT HX OF CURRENT PROBLEM, REHAB EVAL
89 year old male with h/o AFib on Eliquis, HTN, BPH, mild cognitive impairment, presents to [facility] ED from [facility] s/p mechanical fall.  Pt was on floor for at least one hour before being found by staff.  On CT Chest with IV Contrast, pt with multifragmented, displaced posterior/medial left 8th-9th rib, left T7 - T10 transverse process fractures with associated large pleural hematomas with active extravasation/hemorrhage. Because of loculated/masslike configuration of lower LEFT thoracic hematomas, extrapleural hematomas, insinuated between lower LEFT parietal pleura and intercostal muscles is an alternate possibility.  Pt admitted to Trauma service. Pt receiving KCentra 2/2 recent Eliquis.  Thoracic Surgery consulted for active extravasation seen on CT.

## 2024-08-19 NOTE — PHYSICAL THERAPY INITIAL EVALUATION ADULT - ADDITIONAL COMMENTS
149
obtained information from daughter at bedside - pt resides at [facility]. Independent with use of cane. Uses a shower bench. Daughter reports staff assists with ADLs but unsure about mobility.

## 2024-08-19 NOTE — DISCHARGE NOTE PROVIDER - NSFOLLOWUPCLINICS_GEN_ALL_ED_FT
[facility] Acute Care Surgery  Acute Care Surgery  [address] [place] [id number]  Phone: [phone]  Fax:

## 2024-08-19 NOTE — PROGRESS NOTE ADULT - SUBJECTIVE AND OBJECTIVE BOX
HPI: 89M PMH AFib on Eliquis, HTN, BPH, mild cognitive impairment, presents as a fall. He fell earlier this morning in his assisted living facility and was on the floor for at least an hour (true time unknown) before an attendant found him. He was brought in for evaluation. He states he sometimes walks with a cane at home, thinks he was using it but 'slipped and fell'. Reports pain in back and ribs. Denies HS. Trauma surgery consulted, no trauma code activated.    In ED was tachycardic to the 120s, SBP was 160 and he was saturating well on RA. Eliquis and metoprolol last taken 6pm 8/15 (evening prior to presentation). CT scan demonstrated L 8th, 9th rib fractures and L T7-10 TP fractures, as well as a L pleural hematoma with active extravasation. He received 1.5L fluids in the ED.  (16 Aug 2024 11:47)    Hospital Course:    Overnight Events:     89y Male s/p __ seen lying comfortably in bed. Tolerating diet. Passing gas/BM. Voiding. Salguero in with __ clear urine. Denies headache, weakness, numbness, n/v/d, fevers, chills, chest pain, SOB.       Vital Signs Last 24 Hrs  T(C): 36.9 (19 Aug 2024 07:00), Max: 36.9 (19 Aug 2024 07:00)  T(F): 98.4 (19 Aug 2024 07:00), Max: 98.4 (19 Aug 2024 07:00)  HR: 116 (19 Aug 2024 11:00) (97 - 122)  BP: 175/104 (19 Aug 2024 11:00) (124/87 - 177/78)  BP(mean): 120 (19 Aug 2024 11:00) (78 - 144)  RR: 21 (19 Aug 2024 11:00) (15 - 24)  SpO2: 86% (19 Aug 2024 11:00) (86% - 100%)    Parameters below as of 19 Aug 2024 11:00  Patient On (Oxygen Delivery Method): room air    PHYSICAL EXAM:  GENERAL: NAD, well-groomed  HEAD:  Atraumatic, normocephalic  NETO COMA SCORE: E- V- M- = 15  MENTAL STATUS: AAO x3; Awake; Opens eyes spontaneously; Appropriately conversant without aphasia; following simple commands  CRANIAL NERVES: PERRL. EOMI without nystagmus. Face symmetric w/ normal eye closure and smile, tongue midline. Hearing grossly intact. Speech clear. Head turning and shoulder shrug intact.   MOTOR: strength 5/5 b/l upper and lower extremities  EXTREMITIES: no clubbing, cyanosis, or edema  SKIN: Warm, dry    LABS:                        9.5    11.74 )-----------( 120      ( 19 Aug 2024 03:30 )             28.3     08-19    136  |  101  |  18.9  ----------------------------<  107<H>  3.8   |  20.0<L>  |  0.92    Ca    8.1<L>      19 Aug 2024 03:30  Phos  2.2     08-19  Mg     2.0     08-19        Urinalysis Basic - ( 19 Aug 2024 03:30 )    Color: x / Appearance: x / SG: x / pH: x  Gluc: 107 mg/dL / Ketone: x  / Bili: x / Urobili: x   Blood: x / Protein: x / Nitrite: x   Leuk Esterase: x / RBC: x / WBC x   Sq Epi: x / Non Sq Epi: x / Bacteria: x        08-18 @ 07:01  -  08-19 @ 07:00  --------------------------------------------------------  IN: 2149.8 mL / OUT: 705 mL / NET: 1444.8 mL        RADIOLOGY & ADDITIONAL TESTS:   HPI: 89M PMH AFib on Eliquis, HTN, BPH, mild cognitive impairment, presents as a fall. He fell earlier this morning in his assisted living facility and was on the floor for at least an hour (true time unknown) before an attendant found him. He was brought in for evaluation. He states he sometimes walks with a cane at home, thinks he was using it but 'slipped and fell'. Reports pain in back and ribs. Denies HS. Trauma surgery consulted, no trauma code activated.    In ED was tachycardic to the 120s, SBP was 160 and he was saturating well on RA. Eliquis and metoprolol last taken 6pm 8/15 (evening prior to presentation). CT scan demonstrated L 8th, 9th rib fractures and L T7-10 TP fractures, as well as a L pleural hematoma with active extravasation. He received 1.5L fluids in the ED.  (16 Aug 2024 11:47)    Overnight Events:   None neurosurgical     89 yo male seen and examined in bed NAD. No complaints at this time.     Vital Signs Last 24 Hrs  T(C): 36.9 (19 Aug 2024 07:00), Max: 36.9 (19 Aug 2024 07:00)  T(F): 98.4 (19 Aug 2024 07:00), Max: 98.4 (19 Aug 2024 07:00)  HR: 116 (19 Aug 2024 11:00) (97 - 122)  BP: 175/104 (19 Aug 2024 11:00) (124/87 - 177/78)  BP(mean): 120 (19 Aug 2024 11:00) (78 - 144)  RR: 21 (19 Aug 2024 11:00) (15 - 24)  SpO2: 86% (19 Aug 2024 11:00) (86% - 100%)    Parameters below as of 19 Aug 2024 11:00  Patient On (Oxygen Delivery Method): room air    PHYSICAL EXAM:  GENERAL: NAD, well-groomed  HEAD:  Atraumatic, normocephalic  NETO COMA SCORE: E- V- M- = 15  MENTAL STATUS: AAO x3; Awake; Opens eyes spontaneously; Appropriately conversant without aphasia; following simple commands  CRANIAL NERVES: PERRL. EOMI without nystagmus. Face symmetric w/ normal eye closure and smile, tongue midline. Hearing grossly intact. Speech clear  MOTOR: strength 5/5 b/l upper and lower extremities  EXTREMITIES: no clubbing, cyanosis, or edema  SKIN: Warm, dry    LABS:                        9.5    11.74 )-----------( 120      ( 19 Aug 2024 03:30 )             28.3     08-19    136  |  101  |  18.9  ----------------------------<  107<H>  3.8   |  20.0<L>  |  0.92    Ca    8.1<L>      19 Aug 2024 03:30  Phos  2.2     08-19  Mg     2.0     08-19    Urinalysis Basic - ( 19 Aug 2024 03:30 )    Color: x / Appearance: x / SG: x / pH: x  Gluc: 107 mg/dL / Ketone: x  / Bili: x / Urobili: x   Blood: x / Protein: x / Nitrite: x   Leuk Esterase: x / RBC: x / WBC x   Sq Epi: x / Non Sq Epi: x / Bacteria: x    08-18 @ 07:01  -  08-19 @ 07:00  --------------------------------------------------------  IN: 2149.8 mL / OUT: 705 mL / NET: 1444.8 mL    RADIOLOGY & ADDITIONAL TESTS:  < from: CT Thoracic Spine Reform No Cont (08.16.24 @ 09:46) >  IMPRESSION:  Acute fractures of the left seventh through 10th transverse processes and   adjacent 8 through 10th proximal ribs. Extrapleural hematoma is   associated with the rib fractures with active bleeding as described on   recent chest CT.  No additional fractures identified.  Imaging findings compatible with DISH  --- End of Report ---    GEN ALBERT MD; Attending Radiologist  This document has been electronically signed. Aug 16 2024 11:22AM    < end of copied text >

## 2024-08-19 NOTE — PROGRESS NOTE ADULT - ASSESSMENT
88 yo male s/p a fall who was found to have a Left T7-10      Plan:  -Patient seen and examined on AM rounds with Dr. Tom  -Case and plan also discussed with Dr. Swanson  -Given risk of aspiration and limited additional yield of MRI, no role for additional imaging at this time  -Pain Control  -Neurosurgery signing off, No neurosurgical follow up needed for this patient at this time

## 2024-08-19 NOTE — CHART NOTE - NSCHARTNOTEFT_GEN_A_CORE
SICU TRANSFER NOTE  -----------------------------  ICU Admission Date: 8/16  Transfer Date: 08-19-24 @ 13:41    Admission Diagnosis: Lt 8,9 rib fxs, Lt hemothorax, LENARD, urinary retention    Active Problems/injuries: Hemothorax    Procedures: 8/16 IR angio- no intervention done     Consultants:  [ ] Cardiology  [x]  CT Surgery  [ ] Endocrine  [ ] Infectious Disease  [ ] Medicine  [ ]Neurosurgery  [ ] Ortho       [ ] Weight Bearing Status:  [ ] Palliative       [ ] Advanced Directives:    [ ] Physical Medicine and Rehab       [ ] Disposition :   [ ] Plastics  [ ] Pulmonary    Medications  artificial  tears Solution 1 Drop(s) Both EYES every 6 hours PRN  atorvastatin 20 milliGRAM(s) Oral at bedtime  chlorhexidine 2% Cloths 1 Application(s) Topical daily  heparin   Injectable 5000 Unit(s) SubCutaneous every 8 hours  lidocaine   4% Patch 2 Patch Transdermal daily  melatonin 5 milliGRAM(s) Oral at bedtime  metoprolol tartrate 50 milliGRAM(s) Oral two times a day  polyethylene glycol 3350 17 Gram(s) Oral daily  senna 2 Tablet(s) Oral at bedtime  tamsulosin 0.4 milliGRAM(s) Oral at bedtime      [ ] I attest I have reviewed and reconciled all medications prior to transfer    IV Fluids  sodium chloride 0.9% Bolus:   500 milliLiter(s), IV Bolus, once, infuse over 60 Minute(s), Stop After 1 Doses    Indication: XXXXXX    Antibiotics:    Indication: XXXXXXX End Date:XXXXXXX      I have discussed this case with [email or web address] [email or web address] upon transfer and all questions regarding ICU course were answered.  The following items are to be followed up: SICU TRANSFER NOTE  -----------------------------  ICU Admission Date: 8/16  Transfer Date: 08-19-24 @ 13:41    Admission Diagnosis: Lt 8,9 rib fxs, Lt hemothorax, LENARD, urinary retention    Active Problems/injuries: Hemothorax    Procedures: 8/16 IR angio- no intervention done     Consultants:  [ ] Cardiology  [x]  CT Surgery  [ ] Endocrine  [ ] Infectious Disease  [ ] Medicine  [ ]Neurosurgery  [ ] Ortho       [ ] Weight Bearing Status:  [ ] Palliative       [ ] Advanced Directives:    [ ] Physical Medicine and Rehab       [ ] Disposition :   [ ] Plastics  [ ] Pulmonary    Medications  artificial  tears Solution 1 Drop(s) Both EYES every 6 hours PRN  atorvastatin 20 milliGRAM(s) Oral at bedtime  chlorhexidine 2% Cloths 1 Application(s) Topical daily  heparin   Injectable 5000 Unit(s) SubCutaneous every 8 hours  lidocaine   4% Patch 2 Patch Transdermal daily  melatonin 5 milliGRAM(s) Oral at bedtime  metoprolol tartrate 50 milliGRAM(s) Oral two times a day  polyethylene glycol 3350 17 Gram(s) Oral daily  senna 2 Tablet(s) Oral at bedtime  tamsulosin 0.4 milliGRAM(s) Oral at bedtime      [ x] I attest I have reviewed and reconciled all medications prior to transfer    IV Fluids  sodium chloride 0.9% Bolus:   500 milliLiter(s), IV Bolus, once, infuse over 60 Minute(s), Stop After 1 Doses    Indication: XXXXXX    Antibiotics:    Indication: XXXXXXX End Date:XXXXXXX      I have discussed this case with MINGO Tripathi upon transfer and all questions regarding ICU course were answered.  The following items are to be followed up: SICU TRANSFER NOTE  -----------------------------  ICU Admission Date: 8/16  Transfer Date: 08-19-24 @ 13:41    Admission Diagnosis: Lt 8,9 rib fxs, Lt hemothorax, LENARD, urinary retention    Active Problems/injuries: Hemothorax    Procedures: 8/16 IR angio- no intervention done     Consultants:  [ ] Cardiology  [x]  CT Surgery  [ ] Endocrine  [ ] Infectious Disease  [ ] Medicine  [ ]Neurosurgery  [ ] Ortho       [ ] Weight Bearing Status:  [ ] Palliative       [ ] Advanced Directives:    [ ] Physical Medicine and Rehab       [ ] Disposition :   [ ] Plastics  [ ] Pulmonary    Medications  artificial  tears Solution 1 Drop(s) Both EYES every 6 hours PRN  atorvastatin 20 milliGRAM(s) Oral at bedtime  chlorhexidine 2% Cloths 1 Application(s) Topical daily  heparin   Injectable 5000 Unit(s) SubCutaneous every 8 hours  lidocaine   4% Patch 2 Patch Transdermal daily  melatonin 5 milliGRAM(s) Oral at bedtime  metoprolol tartrate 50 milliGRAM(s) Oral two times a day  polyethylene glycol 3350 17 Gram(s) Oral daily  senna 2 Tablet(s) Oral at bedtime  tamsulosin 0.4 milliGRAM(s) Oral at bedtime      [ x] I attest I have reviewed and reconciled all medications prior to transfer    IV Fluids  sodium chloride 0.9% Bolus:   500 milliLiter(s), IV Bolus, once, infuse over 60 Minute(s), Stop After 1 Doses    Indication: XXXXXX    Antibiotics:    Indication: XXXXXXX End Date:XXXXXXX      I have discussed this case with MINGO Tripathi upon transfer and all questions regarding ICU course were answered.  The following items are to be followed up:    Pt with baseline dementia, A&O 1-2 at times  Has had episodes of acute delirium, redirectable without meds  NO need for narcotics, doing well with Tylenol    Home meds restarted  Hemodynamically normal  At times has paroxysmal Afib, rate controlled on PO Metoprolol  Dr Elizalde pt's Cardiologist- recs for restarting Eliquis    On RA  CXR with continued worsening hemothorax, however pt is clinically improved  CTSx is following, bedside US done today. Recs still for conservative management      Tolerating regular diet  Bowel regimen on    Voids  Electrolytes WNL  Pt with baseline CKD    SCDs, SQH now switched to Lovenox as LENARD improved    Eval by PT recs for home with 24/7 A if [facility] staff willing & able to provide assist with home PT; otherwise will require ALICE    Tertiary 8/19.  Sherri called

## 2024-08-19 NOTE — DISCHARGE NOTE PROVIDER - CARE PROVIDER_API CALL
Huang Eric  Thoracic Surgery  [address] [place] [address], [address] [place] [id number]  Phone: [phone]  Fax: [phone]  Follow Up Time:     Georges Swanson  Neurosurgery  [address] [facility], [facility] [address]  [place] [address]  Phone: [phone]  Fax: [phone]  Follow Up Time:

## 2024-08-19 NOTE — DISCHARGE NOTE PROVIDER - DETAILS OF MALNUTRITION DIAGNOSIS/DIAGNOSES
This patient has been assessed with a concern for Malnutrition and was treated during this hospitalization for the following Nutrition diagnosis/diagnoses:     -  08/18/2024: Moderate protein-calorie malnutrition

## 2024-08-19 NOTE — PHYSICAL THERAPY INITIAL EVALUATION ADULT - GENERAL OBSERVATIONS, REHAB EVAL
Pt received in bed + IV + tele//BP monitoring, condom cath + VCBs + daughter present, breathing on RA in NAD, in 0/10 pain, agreeable to PT evaluation

## 2024-08-19 NOTE — DISCHARGE NOTE PROVIDER - NSDCMRMEDTOKEN_GEN_ALL_CORE_FT
amLODIPine 5 mg oral tablet: 1 tab(s) orally once a day  Colace 100 mg oral capsule: 2 cap(s) orally once a day (at bedtime)  Eliquis 5 mg oral tablet: 1 tab(s) orally 2 times a day  furosemide 20 mg oral tablet: 1 tab(s) orally once a day  Melatonin 3 mg oral tablet: 1 tab(s) orally once a day (at bedtime)  metoprolol tartrate 50 mg oral tablet: 1 tab(s) orally 2 times a day  Polyethylene Glycol 3350: 17 gram(s) orally once a day  pravastatin 80 mg oral tablet: 1 tab(s) orally once a day  ramipril 10 mg oral tablet: orally once a day  senna (sennosides) 8.6 mg oral tablet: 1 tab(s) orally once a day  venlafaxine 37.5 mg oral tablet: 1 tab(s) orally once a day   acetaminophen 325 mg oral tablet: 3 tab(s) orally every 8 hours  atorvastatin 20 mg oral tablet: 1 tab(s) orally once a day (at bedtime)  Colace 100 mg oral capsule: 2 cap(s) orally once a day (at bedtime)  dilTIAZem 30 mg oral tablet: 1 tab(s) orally every 6 hours  furosemide 20 mg oral tablet: 1 tab(s) orally once a day  lidocaine 4% topical film: Apply topically to affected area once a day  Melatonin 3 mg oral tablet: 1 tab(s) orally once a day (at bedtime)  metoprolol tartrate 50 mg oral tablet: 1 tab(s) orally 2 times a day  Polyethylene Glycol 3350: 17 gram(s) orally once a day  ramipril: 10 milligram(s) orally once a day  senna (sennosides) 8.6 mg oral tablet: 1 tab(s) orally once a day  venlafaxine 37.5 mg oral tablet: 1 tab(s) orally once a day

## 2024-08-19 NOTE — CONSULT NOTE ADULT - PROBLEM SELECTOR RECOMMENDATION 9
- Longstanding history of Afib on Eliquis.   - Now with recurrent falls per daughter c/b fractures and left pleural hematoma.   - Currently with elevated rates out of bed.   - D/C Norvasc.   - Start diltiazem 30mg PO q6.   - Continue metoprolol 50mg PO BID.   - Can increase both as needed for HR control.  - [id number] score 3.   - AC on hold due to bleeding and falls.   - Will need clearance from surgery teams when AC would be able to be restarted, likely not for a week or two.   - Spoke with patient's daughter Amanda [phone], and states he has suffered many falls previously. She is unsure if she would want AC overall as patient is very impulsive and unstable on his feet. During the time of my evaluation, he tried to get out of bed without assistance, and had a near fall.   - Also discussed LAAO devices with his daughter, but she defers any procedure.   - No AC at this time, will need to re-evaluate prior to D/C.   - Continue telemetry monitoring.
CT Chest - multifragmented, displaced posterior/medial left 8th-9th rib, left T7 - T10 transverse process fractures with associated large pleural hematomas with active extravasation/hemorrhage. Because of loculated/masslike configuration of lower LEFT thoracic hematomas, extrapleural hematomas, insinuated between lower LEFT parietal pleura and intercostal muscles is an alternate possibility    Pt admitted to SICU  Pt recently took Eliquis, currently receiving [facility]  CT Chest reviewed by Dr. Mcnamara - hematoma appears extrapleural, without free flow into the chest. Recommend IR for embolization +/- drain placement due to active extravasation seen on imaging.    Continuous SpO2 monitoring.  Maintain SpO2 >92%. Supplement with O2 therapy PRN.  Pain control prn.  Encourage the use of incentive spirometer, cough/deep breathing exercises, OOB to chair, ambulate as tolerated.  Thoracic Surgery will continue to follow.  Rest of care per primary team.    Plan discussed with Dr. Mcnamara and primary team.

## 2024-08-19 NOTE — DISCHARGE NOTE PROVIDER - HOSPITAL COURSE
Patient is an 89M, PMH AFib on Eliquis, HTN, BPH, mild cognitive impairment, presented on 8/16/24 after a fall at his assisted living facility, found to have Left 8 and 9 rib fractures, left T7-T10 transverse process fractures, and left extrapleural hematoma with active extravasation.   Patient was pan-scanned in the ED, CT Head, CT C/T/L-spine, CT chest / abdomen / pelvis was negative for any other traumatic injuries. Patient was admitted to the SICU for further management. Eliquis reversed with KCENTRA, received 1u PRBC. Thoracic surgery was consulted, advised no acute intervention, and recommended IR consultation; patient was taken to IR for image guided thoracic/intercostal artery angiogram, which was negative for active bleeding.   Course was complicated by urinary retention and LENARD, which resolved. Also complicated by rapid AFib, Hr controlled with Metoprolol and Diltiazem; cardiology was consulted for re-evaluation of need for Eliquis. Patient is an 89M, PMH AFib on Eliquis, HTN, BPH, mild cognitive impairment, presented on 8/16/24 after a fall at his assisted living facility, found to have Left 8 and 9 rib fractures, left T7-T10 transverse process fractures, and left extrapleural hematoma with active extravasation.   Patient was pan-scanned in the ED, CT Head, CT C/T/L-spine, CT chest / abdomen / pelvis was negative for any other traumatic injuries. Patient was admitted to the SICU for further management. Eliquis reversed with KCENTRA, received 1u PRBC. Thoracic surgery was consulted, advised no acute intervention, and recommended IR consultation; patient was taken to IR for image guided thoracic/intercostal artery angiogram, which was negative for active bleeding.   Course was complicated by urinary retention and LENARD, which resolved. Also complicated by rapid AFib, Hr controlled with Metoprolol and Diltiazem; cardiology was consulted for re-evaluation of need for Eliquis.     HEAD CT:  No evidence of an acute traumatic intracranial injury.    CERVICAL SPINE CT:  No evidence of an acute cervical spine fracture.  OTHER: 1.6 cm nodule left lobe of the thyroid gland. Correlate with nonemergent ultrasonography if not recently performed    CT CAP:  1.  Multifragmented, displaced posterior/medial LEFT 8th-9th rib, LEFT   T7, T8, T9, T10 transverse process fractures with associated large   pleural hematomas with active extravasation/hemorrhage  2.  Multiple pancreatic cysts and indeterminant RIGHT upper pole renal   lesion. Correlation with comorbidities may determine need for follow-up   and/or primary evaluation    ***Addendum***  Because of loculated/masslike configuration of lower LEFT thoracic   hematomas, extrapleural hematomas, insinuated between lower LEFT   parietal pleura and intercostal muscles is an alternate possibility. Discussed with KATHRYN Jurado MD at 8/16/2024 10:57 AM.    Pt admitted to SICU.  CTsx consulted for extrapleural hematoma.   IR contact when pt went into [facility] and had significantly worsened CXR.    IR did emergent angio on 8/16, no active bleeding identified.    The pt received 1UPRBC and HR resolved.  Pt remained hemodynamically stable with stable serial H/H.  CTSx had followed pt throughout course- plan for conservative management unless pt acute respiratory decline.    Cardiology consulted for continued anticoagulation recs.  Currently, AC being held for pt's fall risk.  Watchman's offered to pt's daughter, decline at this time.    Eval by PT recs for d/c back to [facility] with 24/7 assist.    At the time of d/c, pt tolerating diet, voids, no issues with pain, SpO2 100% on RA.    Pt stable for d/c today.

## 2024-08-19 NOTE — PROGRESS NOTE ADULT - ASSESSMENT
89 year old male with h/o AFib on Eliquis, HTN, BPH, mild cognitive impairment, presents to [facility] ED from [facility] s/p mechanical fall.  Pt was on floor for at least one hour before being found by staff.  On CT Chest with IV Contrast, pt with multifragmented, displaced posterior/medial left 8th-9th rib, left T7 - T10 transverse process fractures with associated large pleural hematomas with active extravasation/hemorrhage. Because of loculated/masslike configuration of lower LEFT thoracic hematomas, extrapleural hematomas, insinuated between lower LEFT parietal pleura and intercostal muscles is an alternate possibility.  Pt admitted to Trauma service.  Pt receiving KCentra 2/2 recent Eliquis.  Thoracic Surgery consulted for active extravasation seen on CT.

## 2024-08-19 NOTE — CONSULT NOTE ADULT - NS ATTEND AMEND GEN_ALL_CORE FT
atrial fibrillation with rapid ventricular rate . fall on anticoagulation .   off anticoagulation now.  bleeding  risk of recurrent falls as confused.    reevalaute for anticoagulation in 7-10 days once deliruium is under control.  atrial fibrillation : rate control. ct beta-blocker 50 BID. add cardizem 30 Q6. increase as tolerated. control agiation  willsigno ff. no further in opatietn work up.

## 2024-08-19 NOTE — PROGRESS NOTE ADULT - PROBLEM SELECTOR PROBLEM 1
Fracture of multiple ribs of left side

## 2024-08-19 NOTE — CONSULT NOTE ADULT - ASSESSMENT
A/P: Patient is a 89 y/o M with a PMHx of Atrial fibrillation (on Eliquis), dilated aortic root, HTN, HLD, and mild cognitive impairment who presented to [facility] from assisted living facility after an unwitnessed fall. Patient is currently very confused, unable to provide a full ROS. Per chart patient was found on the floor of his assisted living facility after reporting that he "slipped and fell." Unclear how long patient was down on the floor, but also unclear the circumstances. Patient was found upon arrival to be in Afib with RVR, with CT scan revealing L 8th, 9th rib fractures and L T7-10 TP fractures, as well as a L pleural hematoma with active extravasation. Patient was admitted to the SICU where he was also sent to IR for a left chest angiogram, but no active bleeding noted therefore no embolizations performed. Patient has been continuing to progress, and has since downgraded out of the ICU. Patient also noted to be impulsive while in the ICU, often trying to get out of bed and move out of the chair without assistance. Patient is unable to provide detailed ROS at this time.

## 2024-08-19 NOTE — PHYSICAL THERAPY INITIAL EVALUATION ADULT - NSPTDISCHREC_GEN_A_CORE
home with 24/7 A if [facility] staff willing & able to provide assist with home PT; otherwise will require ALICE

## 2024-08-19 NOTE — PROGRESS NOTE ADULT - ASSESSMENT
Neuro: Concern for developing confusion, pain adequate controlled on multimodal pain medications.  Cardiovascular: On home metoprolol  Pulmonary: Now on RA, CXR shows extra pleural collection -possibly a little worse today, still w/no evidence of pulmonary compromise  Heme: H/H remains stable, improved appropriately after transfusion of 1UPRBC  GI:   :   ID:   Fluids:   Endocrine/Electrolytes:   Skin:   Lines:   Code Status:    ASSESSMENT:  Patient is an 89-year old s/p fall with left sided rib fractures, transverse process fx, and extrapleural hematoma / hemothorax     NEURO:   - Patient A&Ox1-2, at neurologic baseline  - Not complaining of any pain at this time  - Concern for developing confusion  - delirium precautions     CARDIO:   #Afib RVR  #H/o HTN  - cw PO metoprolol 50 BID  - TTE on 8/16: EF 61%, mild AR, mod AR, mild-mod TR  - Resumed home Amlodipine and statin  - Holding Ramipril and lasix for now, restart as tolerated  - Cardiology consulted to evaluate for continued need for eliquis / when AC should be restarted    PULM:   #rib fractures, TP fractures, hemothorax/pleural hematoma   - PIC protocol   - S/p angio with IR on 8/16, no evidence of active bleeding  - CT surgery consulted: no acute surgical intervention   - Now on RA, CXR shows extra pleural collection -possibly a little worse today, still w/no evidence of pulmonary compromise    GI: c/w diet as tolerated     RENAL:   #Urinary Retention, metabolic/lactic acidosis, LENARD; resolved  - Voiding, started on flomax 8/17  - CTM UOP and BMP    HEME:   #At risk for DVT:  - DVT ppx with SCDs and SQH  #acute blood loss anemia: trend and transfuse for HGB <7   - s/p kecentra to reverse eliquis  - 1 unit PRBC  on 8/16  - Hgb remains stable, CTM    ID:   - afebrile/ mild leukocytosis  - Trend CBC     ENDO:    - No active issues    LINES/DRAINS/AIRWAY: PIV     CODE STATUS: DNR, trial of intubation     DISPOSITION: Patient stable for downgrade to surgical service

## 2024-08-19 NOTE — CHART NOTE - NSCHARTNOTEFT_GEN_A_CORE
Tertiary Trauma Survey (TTS)    Date of TTS: 08-19-24 @ 16:37                             Admit Date: 08-16-24 @ 11:07         List Injuries Identified to Date:  L 8th, 9th rib fx  LT7-T10 TP fx   L extrapleural hematoma with active extravasation   LENARD   Urinary retention       List Operative and Interventional Radiological Procedures:   8/16 IR angio    Tertiary [x ]  Geriatric Consult [ ]  PTSD [ ]   GOC [ ]   CODE STATUS:   DNR / intubate     Physical Exam:    Neuro: A and Ox3, NAD, Nonfocal. Upper and Lower Extremities Sensory/Motor intact B/L.    HEENT: Normal cephalic, atraumatic. Trachea midline.     Pulm/Chest: diminished breath sounds on L, equal rise and fall of the chest.    Cardiac: A-fib, S1/S2.    GI / Abdomen: Nontender, nondistended.     Musculoskeletal / Extremities: AROM of the extremities. Cervical/Thoracic/Lumbar spine NTTP with no obvious stepoffs. TPP of medial R ankle.    Integumentary: Spots of ecchymosis B/L forearm, hand, and L flank. no lacerations, abrasions.      RADIOLOGICAL FINDINGS REVIEW:      CT HEAD, CT CERVICAL SPINE:  COMPARISON  Head CT is compared to prior study of 7/13/2024.  Cervical spine CT is compared to prior study of 7/13/2024.    HEAD CT FINDINGS  HEMORRHAGE:  No evidence of intracranial hemorrhage.  BRAIN PARENCHYMA:  Moderate atrophy. Mild-to-moderate periventricular and   subcortical white matter microvascular ischemic changes.  No mass or mass   effect.  VENTRICLES / SHIFT:  No hydrocephalus. No midlineshift.  EXTRA-AXIAL / BASAL CISTERNS:  No extra-axial mass. Basal cisterns   preserved.  CALVARIUM AND EXTRACRANIAL SOFT TISSUES:  No depressed calvarial fracture.  SINUSES, ORBITS, MASTOIDS:  The visualized paranasal sinuses and mastoid   air cells are well aerated.    CERVICAL SPINE FINDINGS:  FRACTURES:  No evidence of an acute cervical spine fracture.  ALIGNMENT:  No subluxation.  SPINAL COLUMN:  Vertebral body heights are well-maintained. Narrowing of   the C3-4 through C6-7 disc spaces withassociated endplate degenerative   changes and osteophytes. Moderate anterior bridging osteophytes   compatible with DISH..  OTHER: No prevertebral soft tissue swelling. Small left pleural effusion.   1.6 cm nodule left lobe of the thyroid gland      CT CHEST:  Addendum:  Because of loculated/masslike configuration of lower LEFT thoracic   hematomas, extrapleural hematomas,   insinuated between lower LEFT   parietal pleura and intercostal muscles is an alternate possibility.   Discussed with KATHRYN Jurado MD at 8/16/2024 10:57 AM.    COMPARISON: Lumbar CT 7/13/2024    FINDINGS  CHEST:  LUNGS AND LARGE AIRWAYS: Patent central airways. No segmental airspace   opacities or suspicious pulmonary lesions.  PLEURA: 8.5 x 4 x 14.2cm (TR x AP x CC) pleural-based posteriolateral   LEFT CP angle and adjacent smaller LEFT para-T8-9 masses,  most c/w   pleural hematomas, with internal contrast extravasation, developed from   7/13/2024. Adjacent multifragmented/displaced posterior/medial LEFT   8th-9th rib fractures and LEFT T7, T8, T9, T10 transverse process   fractures. Small dependent pleural effusions/hemothoraces and adjacent   lower lobe passive atelectasis.. No pneumothorax.  VESSELS: No aortic dilatation. Aortic /coronary artery calcification.  HEART: Cardiomegaly No pericardial effusion.  MEDIASTINUM AND SYLVIA: No lymphadenopathy.  CHEST WALL AND LOWER NECK: 15 mm LEFT thyroid nodule with coarse   calcification. Gynecomastia    ABDOMEN AND PELVIS:  LIVER: Within normal limits.  BILE DUCTS: Normal caliber.  GALLBLADDER: Within normal limits.  SPLEEN: Within normal limits.  PANCREAS: Atrophic change. Pancreatic body and tail cysts (3/301, 110)   measure up to 1 cm. 1.2cm pancreatic head cyst with trace calcification   (3/114, 15)  ADRENALS: Within normal limits.  KIDNEYS/URETERS: Symmetric renal enhancement without calculi/ obstructive   uropathy. Too small to characterize renal hypodensities.    Centimeter-sized exophytic RIGHT upper pole renal lesion (5-61) favors    hyperdense cyst but  of indeterminate etiology    BLADDER: Distended urinary bladder  REPRODUCTIVE ORGANS: Enlarged prostate    BOWEL: No bowel obstruction. Normal appendix. No diverticulitis.  PERITONEUM/RETROPERITONEUM: No ascites or pneumoperitoneum. Dependent   presacral edema  VESSELS: Atherosclerotic changes.  LYMPH NODES: No lymphadenopathy.  ABDOMINAL WALL: Prominent spermatic cord fat or small fat-containing   inguinal hernias.  BONES: Multifragmented, displaced posterior/medial LEFT 8th-9th rib, LEFT   T7, T8, T9, T10 transverse process fractures. Diffuse lower   cervical/thoracic diffuse idiopathic skeletal hyperostosis. Mild lumbar   degenerative changes.       XR CHEST PORTABLE URGENT:   COMPARISON STUDY: 6/29/2024    Frontal expiratory view of the chest shows the heart to be similarly   enlarged in size. The lungs show left lower lobe infiltrate or pulmonary   contusion and there is no evidence of pneumothorax nor pleural effusion.    Chest one view 8/16/2024 5:23 PM  Compared to the prior study, there is progression of large left pleural   effusion. No pneumothorax.    Chest one view 8/17/2024 5:44 AM  Compared to the prior study, left pleural effusion has increased in size.         XR PELVIS AP ONLY 1-2 VIEWS  FINDINGS  IMPRESSION:  Intact bones and alignment. No evidenceof fracture or   suspicious lesion.    Intact soft tissues. Contrast-filled urinary bladder consistent with CT   imaging of the same day.        CT THORACIC SPINE REFORMAT, CT LUMBAR SPINE REFORMAT  THORACIC SPINE FINDINGS:  *  FRACTURES:  Acute fractures of the left seventh through 10th transverse processes and   adjacent eighth through 10th proximal ribs. Extrapleural hematomas   associated with the rib fractures with associated active bleeding as   described on recent chest CT.  *  ALIGNMENT:  Exaggeration of the normal thoracic kyphosis. Mild dextroscoliosis. No   subluxation.  *  SPINAL COLUMN:  Vertebral body heights are well-maintained. Large anterior bridging   osteophytes compatible with DISH. Calcification/partial fusion of the   T5-6 through T11-12 disc spaces.    LUMBAR SPINE FINDINGS:  *  FRACTURES:  No evidence of an acute lumbar spine fracture.  *  ALIGNMENT:  Grade 1 anterolisthesis L4 and L5 related to facet DJD.  *  SPINAL COLUMN:  Vertebral body heights are well-maintained. Endplate osteophytes are seen   throughout.  Narrowing of the L4-5 disc space.  *  DISC LEVELS:  T12/L1: No disc bulge or protrusion. Canal and neural foramina are patent  L1-2: Mild disc bulge. Mild facet degenerative changes. Mild [place]   bilateral neural foramina narrowing  L2-3: Mild disc bulge. Moderate right greater than left facet DJD.   Moderate canal and moderate right neural foramina narrowing. Mild left   neural foramina narrowing  L3-4: Mild bulge. Severe right and moderate left facet DJD. Moderate   canal, and moderate right greater than left neural foramina narrowing.  L4-5: Grade 1 anterolisthesis related to severe facet DJD. Mild canal and   moderate bilateral neural foramina narrowing.  L5-S1: No disc protrusion. Severe left and moderate right facet DJD.   Canal and neural foramina appear patent  *  OTHER:  Visualized sacrum appears intact.  Anterior bridging osteophytes about the sacroiliac joints      XR CHEST PORTABLE URGENT   COMPARISON STUDY: 6/29/2024    Frontal expiratory view of the chest shows the heart to be similarly   enlarged in size. The lungs show left lower lobe infiltrate or pulmonary   contusion and there is no evidence of pneumothorax nor pleural effusion.    Chest one view 8/16/2024 5:23 PM  Compared to the prior study, there is progression of large left pleural   effusion. No pneumothorax.    Chest one view 8/17/2024 5:44 AM  Compared to the prior study, left pleural effusion has increased in size.          INCIDENTAL FINDINGS:    [ ] No    [X ] Yes, Findings are:  15 mm left thyroid nodule  1.2 cm pancreatic body/tail/head cysts   Right upper pole renal lesion (likely cystic)      [ ] Tertiary exam complete, there are no new injuries identified    [X] Tertiary exam done, new injuries identified are: R medial ankle pain                [X ] Imaging ordered: X-ray of R ankle

## 2024-08-19 NOTE — DISCHARGE NOTE PROVIDER - NSDCCPCAREPLAN_GEN_ALL_CORE_FT
PRINCIPAL DISCHARGE DIAGNOSIS  Diagnosis: Pleural hemorrhage  Assessment and Plan of Treatment:       SECONDARY DISCHARGE DIAGNOSES  Diagnosis: Fracture of transverse process of thoracic vertebra  Assessment and Plan of Treatment:     Diagnosis: Thyroid nodule  Assessment and Plan of Treatment: Sometimes during the process of diagnosis and treatment for one disorder, a second problem or abnormality is found. We call this an incidental finding. An incidental finding is not related to why you came into the hospital, but is something that should be followed up on after you leave the hospital.  During your care, the following incidental finding was identified and discussed with you or your surrogate: 15 mm Left thyroid nodule  Seen on: CT Chest  This finding should be followed up by: Primary care provider, follow-up thyroid ultrasound advised      Diagnosis: Renal cyst  Assessment and Plan of Treatment: Sometimes during the process of diagnosis and treatment for one disorder, a second problem or abnormality is found. We call this an incidental finding. An incidental finding is not related to why you came into the hospital, but is something that should be followed up on after you leave the hospital.  During your care, the following incidental finding was identified and discussed with you or your surrogate: Right upper pole renal lesion (likely cyst)  Seen on: CT Abdomen  This finding should be followed up by: Primary care provider, follow-up kindey ultrasound advised      Diagnosis: Pancreatic cyst  Assessment and Plan of Treatment: Sometimes during the process of diagnosis and treatment for one disorder, a second problem or abnormality is found. We call this an incidental finding. An incidental finding is not related to why you came into the hospital, but is something that should be followed up on after you leave the hospital.  During your care, the following incidental finding was identified and discussed with you or your surrogate: 1.2 cm Pancreatic cyst  Seen on: CT abdomen  This finding should be followed up by: Primary care provider       PRINCIPAL DISCHARGE DIAGNOSIS  Diagnosis: Pleural hemorrhage  Assessment and Plan of Treatment: Pt may reusme regular diet and showering as usual.  It is recommended that you do not lift/push/pull anything greater than 10lbs, as it may increase your pain.  Continue to use the incentive spirometer 10x/hr every hour while awake to decrease your chance of getting pneumonia.  Follow up with your PMD and the [facility] within 2 weeks from [facility].  You must call to make an appointment.  Patient is advised to RETURN TO THE EMERGENCY DEPARTMENT for any of the following - worsening pain, fever/chills, nausea/vomiting, altered mental status, chest pain, shortness of breath, or any other new / worsening symptom.        SECONDARY DISCHARGE DIAGNOSES  Diagnosis: Fracture of transverse process of thoracic vertebra  Assessment and Plan of Treatment: No intervention needed    Diagnosis: Thyroid nodule  Assessment and Plan of Treatment: Sometimes during the process of diagnosis and treatment for one disorder, a second problem or abnormality is found. We call this an incidental finding. An incidental finding is not related to why you came into the hospital, but is something that should be followed up on after you leave the hospital.  During your care, the following incidental finding was identified and discussed with you or your surrogate: 15 mm Left thyroid nodule  Seen on: CT Chest  This finding should be followed up by: Primary care provider, follow-up thyroid ultrasound advised      Diagnosis: Renal cyst  Assessment and Plan of Treatment: Sometimes during the process of diagnosis and treatment for one disorder, a second problem or abnormality is found. We call this an incidental finding. An incidental finding is not related to why you came into the hospital, but is something that should be followed up on after you leave the hospital.  During your care, the following incidental finding was identified and discussed with you or your surrogate: Right upper pole renal lesion (likely cyst)  Seen on: CT Abdomen  This finding should be followed up by: Primary care provider, follow-up kindey ultrasound advised      Diagnosis: Fracture of multiple ribs of left side  Assessment and Plan of Treatment: As above    Diagnosis: Pancreatic cyst  Assessment and Plan of Treatment: Sometimes during the process of diagnosis and treatment for one disorder, a second problem or abnormality is found. We call this an incidental finding. An incidental finding is not related to why you came into the hospital, but is something that should be followed up on after you leave the hospital.  During your care, the following incidental finding was identified and discussed with you or your surrogate: 1.2 cm Pancreatic cyst  Seen on: CT abdomen  This finding should be followed up by: Primary care provider

## 2024-08-19 NOTE — PROGRESS NOTE ADULT - PROBLEM SELECTOR PLAN 1
s/p IR evaluation and angio for possible embolization on 8/17 revealed No active extravasation seen by IR on angio, rec conservative management.   Continue conservative management. Trend H/H, fevers.  Continue acetaminophen IV every 6 hours PRN for pain management.  Maintain SpO2 >92%. Currently on room air.   Encourage the use of incentive spirometer, cough/deep breathing exercises, OOB to chair, ambulate as tolerated.  CT imaging reviewed by Dr. Mcnamara, extra pleural hematoma. No plan for thoracic surgery intervention at this time. Continue to trend fevers & WBC, ,if patient spikes fevers then consider IR tube placement.     Small L pleural effusion on US today, would not recommend draining at this time as risk of disrupting hematoma is high and patient has no significant benefit as he is stable from a respiratory standpoint on room air   D/W Dr. Eric
s/p IR evaluation and angio for possible embolization on 8/17. No active extravasation seen by IR on angio, rec conservative management.   Continue conservative management. Trend H/H, fevers.  Continue acetaminophen IV every 6 hours PRN for pain management.  Maintain SpO2 >92%. Supplement with O2 therapy PRN.  Respiratory status stable, on room air. In no respiratory distress.   Encourage the use of incentive spirometer, cough/deep breathing exercises, OOB to chair, ambulate as tolerated.  CT imaging reviewed by Dr. Mcnamara, extra pleural hematoma. No plan for thoracic surgery intervention at this time. Continue to trend fevers & WBC, ,if patient spikes fevers then consider IR tube placement.   Continue care as per primary team.  Thoracic Surgery will continue to follow.  Plan discussed with Dr. Mcnamara.
s/p IR for embolization on 8/17, continue conservative management   Continue acetaminophen IV every 6 hours PRN for pain management.  Maintain SpO2 >92%. Supplement with O2 therapy PRN.  Encourage the use of incentive spirometer, cough/deep breathing exercises, OOB to chair, ambulate as tolerated.  Continue care as per primary team.  Thoracic Surgery will continue to follow.  Plan discussed with Dr. Mcnamara in thoracic surgery rounds.

## 2024-08-19 NOTE — PROGRESS NOTE ADULT - SUBJECTIVE AND OBJECTIVE BOX
INTERVAL HPI/OVERNIGHT EVENTS:      SUBJECTIVE:    ICU Vital Signs Last 24 Hrs  T(C): 36.9 (19 Aug 2024 07:00), Max: 36.9 (19 Aug 2024 07:00)  T(F): 98.4 (19 Aug 2024 07:00), Max: 98.4 (19 Aug 2024 07:00)  HR: 103 (19 Aug 2024 10:00) (97 - 122)  BP: 168/80 (19 Aug 2024 10:00) (116/78 - 177/78)  BP(mean): 98 (19 Aug 2024 10:00) (78 - 144)  ABP: --  ABP(mean): --  RR: 22 (19 Aug 2024 10:00) (15 - 24)  SpO2: 98% (19 Aug 2024 09:00) (94% - 100%)    O2 Parameters below as of 19 Aug 2024 09:00  Patient On (Oxygen Delivery Method): room air          I&O's Detail    18 Aug 2024 07:01  -  19 Aug 2024 07:00  --------------------------------------------------------  IN:    IV PiggyBack: 599.8 mL    multiple electrolytes Injection Type 1.: 1550 mL  Total IN: 2149.8 mL    OUT:    Indwelling Catheter - Urethral (mL): 485 mL    Voided (mL): 220 mL  Total OUT: 705 mL    Total NET: 1444.8 mL          MEDICATIONS  (STANDING):  atorvastatin 20 milliGRAM(s) Oral at bedtime  chlorhexidine 2% Cloths 1 Application(s) Topical daily  heparin   Injectable 5000 Unit(s) SubCutaneous every 8 hours  lidocaine   4% Patch 2 Patch Transdermal daily  melatonin 5 milliGRAM(s) Oral at bedtime  metoprolol tartrate 50 milliGRAM(s) Oral two times a day  multiple electrolytes Injection Type 1 1000 milliLiter(s) (50 mL/Hr) IV Continuous <Continuous>  polyethylene glycol 3350 17 Gram(s) Oral daily  senna 2 Tablet(s) Oral at bedtime  tamsulosin 0.4 milliGRAM(s) Oral at bedtime    MEDICATIONS  (PRN):  artificial  tears Solution 1 Drop(s) Both EYES every 6 hours PRN Dry Eyes    Drug Dosing Weight  Height (cm): 170.2 (16 Aug 2024 12:00)  Weight (kg): 85.8 (16 Aug 2024 12:00)  BMI (kg/m2): 29.6 (16 Aug 2024 12:00)  BSA (m2): 1.97 (16 Aug 2024 12:00)    LABS:    CBC Full  -  ( 19 Aug 2024 03:30 )  WBC Count : 11.74 K/uL  RBC Count : 3.15 M/uL  Hemoglobin : 9.5 g/dL  Hematocrit : 28.3 %  Platelet Count - Automated : 120 K/uL  Mean Cell Volume : 89.8 fl  Mean Cell Hemoglobin : 30.2 pg  Mean Cell Hemoglobin Concentration : 33.6 gm/dL  Auto Neutrophil # : x  Auto Lymphocyte # : x  Auto Monocyte # : x  Auto Eosinophil # : x  Auto Basophil # : x  Auto Neutrophil % : x  Auto Lymphocyte % : x  Auto Monocyte % : x  Auto Eosinophil % : x  Auto Basophil % : x    08-19    136  |  101  |  18.9  ----------------------------<  107<H>  3.8   |  20.0<L>  |  0.92    Ca    8.1<L>      19 Aug 2024 03:30  Phos  2.2     08-19  Mg     2.0     08-19        Urinalysis Basic - ( 19 Aug 2024 03:30 )    Color: x / Appearance: x / SG: x / pH: x  Gluc: 107 mg/dL / Ketone: x  / Bili: x / Urobili: x   Blood: x / Protein: x / Nitrite: x   Leuk Esterase: x / RBC: x / WBC x   Sq Epi: x / Non Sq Epi: x / Bacteria: x        Physical Exam:  GENERAL:  NEUROLOGIC:  HEENT:  NECK:  HEART:  CHEST/LUNG:  ABDOMEN:  EXTREMITIES:  SKIN:    PATIENT CARE ACCESS DEVICES:  [ ] Peripheral IV  [ ] Central Venous Line	[ ] R	[ ] L	[ ] IJ	[ ] Fem	[ ] SC	   [ ] Arterial Line		[ ] R	[ ] L	[ ] Fem	[ ] Rad	[ ] Ax	   [ ] PICC:					[ ] Mediport  [ ] Urinary Catheter:   [ ] Necessity of urinary, arterial, and venous catheters discussed           HPI:  89M PMH AFib on Eliquis, HTN, BPH, mild cognitive impairment, presents as a fall. He fell earlier this morning in his assisted living facility and was on the floor for at least an hour (true time unknown) before an attendant found him. He was brought in for evaluation. He states he sometimes walks with a cane at home, thinks he was using it but 'slipped and fell'. Reports pain in back and ribs. Denies HS. Trauma surgery consulted, no trauma code activated.    In ED was tachycardic to the 120s, SBP was 160 and he was saturating well on RA. Eliquis and metoprolol last taken 6pm 8/15 (evening prior to presentation). CT scan demonstrated L 8th, 9th rib fractures and L T7-10 TP fractures, as well as a L pleural hematoma with active extravasation. He received 1.5L fluids in the ED.  (16 Aug 2024 11:47)      INTERVAL HPI/OVERNIGHT EVENTS:  - Patient remains hemodynamically stable, Hgb stable  - Satting well on RA, appears well despite persistence of Lt lung hematoma on AM CXR      SUBJECTIVE:    ICU Vital Signs Last 24 Hrs  T(C): 36.9 (19 Aug 2024 07:00), Max: 36.9 (19 Aug 2024 07:00)  T(F): 98.4 (19 Aug 2024 07:00), Max: 98.4 (19 Aug 2024 07:00)  HR: 103 (19 Aug 2024 10:00) (97 - 122)  BP: 168/80 (19 Aug 2024 10:00) (116/78 - 177/78)  BP(mean): 98 (19 Aug 2024 10:00) (78 - 144)  ABP: --  ABP(mean): --  RR: 22 (19 Aug 2024 10:00) (15 - 24)  SpO2: 98% (19 Aug 2024 09:00) (94% - 100%)    O2 Parameters below as of 19 Aug 2024 09:00  Patient On (Oxygen Delivery Method): room air          I&O's Detail    18 Aug 2024 07:01  -  19 Aug 2024 07:00  --------------------------------------------------------  IN:    IV PiggyBack: 599.8 mL    multiple electrolytes Injection Type 1.: 1550 mL  Total IN: 2149.8 mL    OUT:    Indwelling Catheter - Urethral (mL): 485 mL    Voided (mL): 220 mL  Total OUT: 705 mL    Total NET: 1444.8 mL          MEDICATIONS  (STANDING):  atorvastatin 20 milliGRAM(s) Oral at bedtime  chlorhexidine 2% Cloths 1 Application(s) Topical daily  heparin   Injectable 5000 Unit(s) SubCutaneous every 8 hours  lidocaine   4% Patch 2 Patch Transdermal daily  melatonin 5 milliGRAM(s) Oral at bedtime  metoprolol tartrate 50 milliGRAM(s) Oral two times a day  multiple electrolytes Injection Type 1 1000 milliLiter(s) (50 mL/Hr) IV Continuous <Continuous>  polyethylene glycol 3350 17 Gram(s) Oral daily  senna 2 Tablet(s) Oral at bedtime  tamsulosin 0.4 milliGRAM(s) Oral at bedtime    MEDICATIONS  (PRN):  artificial  tears Solution 1 Drop(s) Both EYES every 6 hours PRN Dry Eyes    Drug Dosing Weight  Height (cm): 170.2 (16 Aug 2024 12:00)  Weight (kg): 85.8 (16 Aug 2024 12:00)  BMI (kg/m2): 29.6 (16 Aug 2024 12:00)  BSA (m2): 1.97 (16 Aug 2024 12:00)    LABS:    CBC Full  -  ( 19 Aug 2024 03:30 )  WBC Count : 11.74 K/uL  RBC Count : 3.15 M/uL  Hemoglobin : 9.5 g/dL  Hematocrit : 28.3 %  Platelet Count - Automated : 120 K/uL  Mean Cell Volume : 89.8 fl  Mean Cell Hemoglobin : 30.2 pg  Mean Cell Hemoglobin Concentration : 33.6 gm/dL  Auto Neutrophil # : x  Auto Lymphocyte # : x  Auto Monocyte # : x  Auto Eosinophil # : x  Auto Basophil # : x  Auto Neutrophil % : x  Auto Lymphocyte % : x  Auto Monocyte % : x  Auto Eosinophil % : x  Auto Basophil % : x    08-19    136  |  101  |  18.9  ----------------------------<  107<H>  3.8   |  20.0<L>  |  0.92    Ca    8.1<L>      19 Aug 2024 03:30  Phos  2.2     08-19  Mg     2.0     08-19        Urinalysis Basic - ( 19 Aug 2024 03:30 )    Color: x / Appearance: x / SG: x / pH: x  Gluc: 107 mg/dL / Ketone: x  / Bili: x / Urobili: x   Blood: x / Protein: x / Nitrite: x   Leuk Esterase: x / RBC: x / WBC x   Sq Epi: x / Non Sq Epi: x / Bacteria: x        Physical Exam:  GENERAL: NAD, Resting in bed  HEENT:  Head atraumatic, Moist mucous membranes. No JVD   CHEST/LUNG: Symmetric and unlabored respirations  HEART: Irregular rhythm, Normal S1 S2   ABDOMEN: Abdomen soft nontender.   EXTREMITIES:  No clubbing, cyanosis, or edema  NERVOUS SYSTEM:  Alert & Oriented X1-2, non-focal and spontaneous movements of all extremities  SKIN: No rashes or lesions HPI:  89M PMH AFib on Eliquis, HTN, BPH, mild cognitive impairment, presents as a fall. He fell earlier this morning in his assisted living facility and was on the floor for at least an hour (true time unknown) before an attendant found him. He was brought in for evaluation. He states he sometimes walks with a cane at home, thinks he was using it but 'slipped and fell'. Reports pain in back and ribs. Denies HS. Trauma surgery consulted, no trauma code activated.    In ED was tachycardic to the 120s, SBP was 160 and he was saturating well on RA. Eliquis and metoprolol last taken 6pm 8/15 (evening prior to presentation). CT scan demonstrated L 8th, 9th rib fractures and L T7-10 TP fractures, as well as a L pleural hematoma with active extravasation. He received 1.5L fluids in the ED.  (16 Aug 2024 11:47)      INTERVAL HPI/OVERNIGHT EVENTS:  - Patient remains hemodynamically stable, Hgb stable  - Satting well on RA, appears well despite persistence of Lt lung hematoma on AM CXR      SUBJECTIVE:    ICU Vital Signs Last 24 Hrs  T(C): 36.9 (19 Aug 2024 07:00), Max: 36.9 (19 Aug 2024 07:00)  T(F): 98.4 (19 Aug 2024 07:00), Max: 98.4 (19 Aug 2024 07:00)  HR: 103 (19 Aug 2024 10:00) (97 - 122)  BP: 168/80 (19 Aug 2024 10:00) (116/78 - 177/78)  BP(mean): 98 (19 Aug 2024 10:00) (78 - 144)  ABP: --  ABP(mean): --  RR: 22 (19 Aug 2024 10:00) (15 - 24)  SpO2: 98% (19 Aug 2024 09:00) (94% - 100%)    O2 Parameters below as of 19 Aug 2024 09:00  Patient On (Oxygen Delivery Method): room air          I&O's Detail    18 Aug 2024 07:01  -  19 Aug 2024 07:00  --------------------------------------------------------  IN:    IV PiggyBack: 599.8 mL    multiple electrolytes Injection Type 1.: 1550 mL  Total IN: 2149.8 mL    OUT:    Indwelling Catheter - Urethral (mL): 485 mL    Voided (mL): 220 mL  Total OUT: 705 mL    Total NET: 1444.8 mL          MEDICATIONS  (STANDING):  atorvastatin 20 milliGRAM(s) Oral at bedtime  chlorhexidine 2% Cloths 1 Application(s) Topical daily  heparin   Injectable 5000 Unit(s) SubCutaneous every 8 hours  lidocaine   4% Patch 2 Patch Transdermal daily  melatonin 5 milliGRAM(s) Oral at bedtime  metoprolol tartrate 50 milliGRAM(s) Oral two times a day  multiple electrolytes Injection Type 1 1000 milliLiter(s) (50 mL/Hr) IV Continuous <Continuous>  polyethylene glycol 3350 17 Gram(s) Oral daily  senna 2 Tablet(s) Oral at bedtime  tamsulosin 0.4 milliGRAM(s) Oral at bedtime    MEDICATIONS  (PRN):  artificial  tears Solution 1 Drop(s) Both EYES every 6 hours PRN Dry Eyes    Drug Dosing Weight  Height (cm): 170.2 (16 Aug 2024 12:00)  Weight (kg): 85.8 (16 Aug 2024 12:00)  BMI (kg/m2): 29.6 (16 Aug 2024 12:00)  BSA (m2): 1.97 (16 Aug 2024 12:00)    LABS:    CBC Full  -  ( 19 Aug 2024 03:30 )  WBC Count : 11.74 K/uL  RBC Count : 3.15 M/uL  Hemoglobin : 9.5 g/dL  Hematocrit : 28.3 %  Platelet Count - Automated : 120 K/uL  Mean Cell Volume : 89.8 fl  Mean Cell Hemoglobin : 30.2 pg  Mean Cell Hemoglobin Concentration : 33.6 gm/dL  Auto Neutrophil # : x  Auto Lymphocyte # : x  Auto Monocyte # : x  Auto Eosinophil # : x  Auto Basophil # : x  Auto Neutrophil % : x  Auto Lymphocyte % : x  Auto Monocyte % : x  Auto Eosinophil % : x  Auto Basophil % : x    08-19    136  |  101  |  18.9  ----------------------------<  107<H>  3.8   |  20.0<L>  |  0.92    Ca    8.1<L>      19 Aug 2024 03:30  Phos  2.2     08-19  Mg     2.0     08-19        Urinalysis Basic - ( 19 Aug 2024 03:30 )    Color: x / Appearance: x / SG: x / pH: x  Gluc: 107 mg/dL / Ketone: x  / Bili: x / Urobili: x   Blood: x / Protein: x / Nitrite: x   Leuk Esterase: x / RBC: x / WBC x   Sq Epi: x / Non Sq Epi: x / Bacteria: x        Physical Exam:  GENERAL: NAD, Resting in bed  HEENT:  Head atraumatic, Moist mucous membranes. No JVD   CHEST/LUNG: Symmetric and unlabored respirations  HEART: Irregular rhythm, Normal S1 S2   ABDOMEN: Abdomen soft nontender.   EXTREMITIES:  No clubbing, cyanosis, or edema, L hip abdominal wall ecchymosis  NERVOUS SYSTEM:  Alert & Oriented X1-2, non-focal and spontaneous movements of all extremities  SKIN: No rashes or lesions

## 2024-08-20 LAB
ANION GAP SERPL CALC-SCNC: 13 MMOL/L — SIGNIFICANT CHANGE UP (ref 5–17)
BUN SERPL-MCNC: 17.6 MG/DL — SIGNIFICANT CHANGE UP (ref 8–20)
CALCIUM SERPL-MCNC: 7.9 MG/DL — LOW (ref 8.4–10.5)
CHLORIDE SERPL-SCNC: 104 MMOL/L — SIGNIFICANT CHANGE UP (ref 96–108)
CO2 SERPL-SCNC: 19 MMOL/L — LOW (ref 22–29)
CREAT SERPL-MCNC: 0.86 MG/DL — SIGNIFICANT CHANGE UP (ref 0.5–1.3)
EGFR: 83 ML/MIN/1.73M2 — SIGNIFICANT CHANGE UP
GLUCOSE SERPL-MCNC: 93 MG/DL — SIGNIFICANT CHANGE UP (ref 70–99)
HCT VFR BLD CALC: 26.5 % — LOW (ref 39–50)
HGB BLD-MCNC: 8.8 G/DL — LOW (ref 13–17)
MAGNESIUM SERPL-MCNC: 2.1 MG/DL — SIGNIFICANT CHANGE UP (ref 1.6–2.6)
MCHC RBC-ENTMCNC: 30.2 PG — SIGNIFICANT CHANGE UP (ref 27–34)
MCHC RBC-ENTMCNC: 33.2 GM/DL — SIGNIFICANT CHANGE UP (ref 32–36)
MCV RBC AUTO: 91.1 FL — SIGNIFICANT CHANGE UP (ref 80–100)
PHOSPHATE SERPL-MCNC: 2.3 MG/DL — LOW (ref 2.4–4.7)
PLATELET # BLD AUTO: 152 K/UL — SIGNIFICANT CHANGE UP (ref 150–400)
POTASSIUM SERPL-MCNC: 3.8 MMOL/L — SIGNIFICANT CHANGE UP (ref 3.5–5.3)
POTASSIUM SERPL-SCNC: 3.8 MMOL/L — SIGNIFICANT CHANGE UP (ref 3.5–5.3)
RBC # BLD: 2.91 M/UL — LOW (ref 4.2–5.8)
RBC # FLD: 13.8 % — SIGNIFICANT CHANGE UP (ref 10.3–14.5)
SODIUM SERPL-SCNC: 136 MMOL/L — SIGNIFICANT CHANGE UP (ref 135–145)
WBC # BLD: 9.11 K/UL — SIGNIFICANT CHANGE UP (ref 3.8–10.5)
WBC # FLD AUTO: 9.11 K/UL — SIGNIFICANT CHANGE UP (ref 3.8–10.5)

## 2024-08-20 PROCEDURE — 99232 SBSQ HOSP IP/OBS MODERATE 35: CPT | Mod: FS

## 2024-08-20 PROCEDURE — 99223 1ST HOSP IP/OBS HIGH 75: CPT

## 2024-08-20 RX ORDER — ACETAMINOPHEN 325 MG/1
975 TABLET ORAL EVERY 8 HOURS
Refills: 0 | Status: DISCONTINUED | OUTPATIENT
Start: 2024-08-20 | End: 2024-08-21

## 2024-08-20 RX ORDER — POTASSIUM PHOSPHATE 236; 224 MG/ML; MG/ML
15 INJECTION, SOLUTION INTRAVENOUS ONCE
Refills: 0 | Status: COMPLETED | OUTPATIENT
Start: 2024-08-20 | End: 2024-08-20

## 2024-08-20 RX ADMIN — DILTIAZEM HYDROCHLORIDE 30 MILLIGRAM(S): 5 INJECTION INTRAVENOUS at 05:38

## 2024-08-20 RX ADMIN — Medication 2 TABLET(S): at 21:38

## 2024-08-20 RX ADMIN — Medication 20 MILLIGRAM(S): at 21:38

## 2024-08-20 RX ADMIN — DILTIAZEM HYDROCHLORIDE 30 MILLIGRAM(S): 5 INJECTION INTRAVENOUS at 17:42

## 2024-08-20 RX ADMIN — DILTIAZEM HYDROCHLORIDE 30 MILLIGRAM(S): 5 INJECTION INTRAVENOUS at 12:54

## 2024-08-20 RX ADMIN — POTASSIUM PHOSPHATE 62.5 MILLIMOLE(S): 236; 224 INJECTION, SOLUTION INTRAVENOUS at 12:49

## 2024-08-20 RX ADMIN — ENOXAPARIN SODIUM 30 MILLIGRAM(S): 100 INJECTION SUBCUTANEOUS at 05:38

## 2024-08-20 RX ADMIN — ACETAMINOPHEN 975 MILLIGRAM(S): 325 TABLET ORAL at 12:51

## 2024-08-20 RX ADMIN — VENLAFAXINE HYDROCHLORIDE 37.5 MILLIGRAM(S): 150 CAPSULE, EXTENDED RELEASE ORAL at 12:51

## 2024-08-20 RX ADMIN — Medication 5 MILLIGRAM(S): at 21:38

## 2024-08-20 RX ADMIN — ACETAMINOPHEN 975 MILLIGRAM(S): 325 TABLET ORAL at 13:50

## 2024-08-20 RX ADMIN — Medication 2 PATCH: at 20:13

## 2024-08-20 RX ADMIN — POLYETHYLENE GLYCOL 3350 17 GRAM(S): 17 POWDER, FOR SOLUTION ORAL at 12:51

## 2024-08-20 RX ADMIN — ENOXAPARIN SODIUM 30 MILLIGRAM(S): 100 INJECTION SUBCUTANEOUS at 17:42

## 2024-08-20 RX ADMIN — Medication 2 PATCH: at 12:51

## 2024-08-20 RX ADMIN — METOPROLOL TARTRATE 50 MILLIGRAM(S): 100 TABLET ORAL at 17:42

## 2024-08-20 RX ADMIN — TAMSULOSIN HYDROCHLORIDE 0.4 MILLIGRAM(S): 0.4 CAPSULE ORAL at 21:38

## 2024-08-20 RX ADMIN — ACETAMINOPHEN 975 MILLIGRAM(S): 325 TABLET ORAL at 21:38

## 2024-08-20 RX ADMIN — Medication 2 PATCH: at 01:18

## 2024-08-20 RX ADMIN — METOPROLOL TARTRATE 50 MILLIGRAM(S): 100 TABLET ORAL at 05:38

## 2024-08-20 NOTE — CONSULT NOTE ADULT - NSCONSULTADDITIONALINFOA_GEN_ALL_CORE
Identification of Seniors at Risk:                                                                                                                                       Before the event that brought you to the hospital, did you need someone to help you on a regular basis?     In the 24 hours before your injury, have you needed more help than usual?                                                          Have you been hospitalized for one or more nights during the past six months?                                                   In general, do you have serious problems with your vision that cannot be corrected with glasses?                   In general, do you have serious problems with your memory?                                                                                   Do you take six or more different medications every day?                                                                                             A positive screen is an answer of yes to 2 or more - Total:

## 2024-08-20 NOTE — CONSULT NOTE ADULT - SUBJECTIVE AND OBJECTIVE BOX
HPI:  89M PMH AFib on Eliquis, HTN, BPH, mild cognitive impairment, presents as a fall. He fell earlier in morning day of admission in his assisted living facility and was on the floor for at least an hour (true time unknown) before an attendant found him. He was brought in for evaluation. He states he sometimes walks with a cane at home, thinks he was using it but 'slipped and fell'. Reports pain in back and ribs. Denies HS. Trauma surgery consulted, no trauma code activated.    In ED was tachycardic to the 120s, SBP was 160 and he was saturating well on RA. Eliquis and metoprolol last taken 6pm 8/15 (evening prior to presentation).  On CT Chest with IV Contrast, pt with multifragmented, displaced posterior/medial left 8th-9th rib, left T7 - T10 transverse process fractures with associated large pleural hematomas with active extravasation/hemorrhage. Because of loculated/masslike configuration of lower LEFT thoracic hematomas, extrapleural hematomas, insinuated between lower LEFT parietal pleura and intercostal muscles is an alternate possibility.  Pt admitted to Trauma service.  Pt received  KCentra 2/2 recent Eliquis.  Thoracic Surgery consulted for active extravasation seen on CT. CT Chest reviewed by Dr. Mcnamara - hematoma appears extrapleural, without free flow into the chest. Recommend IR for embolization +/- drain placement due to active extravasation seen on imaging. Presented to IR on 8/17  for angiogram and possible embolization. Multiple left intercostal arteries interrogated and angiography performed. No evidence of active bleeding noted. No embolization performed. Hemostasis achieved at the right common femoral arteriotomy via mynx closure. Patient received one unit PRBC for ABLA. Course complicated by urinary retention, started on flomax, urinary retention now resolved, voiding freely. Cardiology consulted for AFIBB. On Cardizem and Metoprolol. [id number] score 3.  AC on hold due to bleeding and falls. As per patient's daughter Amanda [phone], patient  has suffered many falls previously. She is unsure if she would want AC overall as patient is very impulsive and unstable on his feet. LAAO devices  were discussed with his daughter, but she defers any procedure at this time. Will need to re-evaluate AC  prior to D/C as per Neurosurgery recommendations. Medicine consulted for Sherri-trauma evaluation.       PAST MEDICAL & SURGICAL HISTORY:  HTN (hypertension)      Afib      Chronic constipation      Insomnia      Hyperlipidemia      Anxiety and depression      Mild cognitive impairment      No significant past surgical history        HOME  MEDICATIONS    	furosemide 20 mg oral tablet: 1 tab(s) orally once a day  · 	metoprolol tartrate 50 mg oral tablet , 1 tab(s) orally 2 times a day  · 	pravastatin 80 mg oral tablet: 1 tab(s) orally once a day  · 	amLODIPine 5 mg oral tablet: 1 tab(s) orally once a day  · 	ramipril 10 mg oral tablet:  orally once a day  · 	senna (sennosides) 8.6 mg oral tablet:  1 tab(s) orally once a day  · 	Colace 100 mg oral capsule:  2 cap(s) orally once a day (at bedtime)  · 	venlafaxine 37.5 mg oral tablet:  1 tab(s) orally once a day  · 	Melatonin 3 mg oral tablet: 1 tab(s) orally once a day (at bedtime)  · 	Polyethylene Glycol 3350: 17 gram(s) orally once a day  · 	Eliquis 5 mg oral tablet: 1 tab(s) orally 2 times a day      Allergies    No Known Allergies    Intolerances        SOCIAL HISTORY:  Never a smoker  Denies ETOH/IVDA    FAMILY HISTORY:  No pertinent family history in first degree relatives        Vital Signs Last 24 Hrs  T(C): 36.6 (20 Aug 2024 04:50), Max: 37.2 (20 Aug 2024 00:29)  T(F): 97.9 (20 Aug 2024 04:50), Max: 98.9 (20 Aug 2024 00:29)  HR: 97 (20 Aug 2024 04:50) (92 - 144)  BP: 129/75 (20 Aug 2024 04:50) (128/90 - 175/104)  BP(mean): 88 (19 Aug 2024 21:36) (88 - 144)  RR: 18 (20 Aug 2024 04:50) (15 - 38)  SpO2: 94% (20 Aug 2024 04:50) (86% - 100%)    Parameters below as of 20 Aug 2024 04:50  Patient On (Oxygen Delivery Method): room air        LABS:                        8.8    9.11  )-----------( 152      ( 20 Aug 2024 06:20 )             26.5     08-19    136  |  101  |  18.9  ----------------------------<  107<H>  3.8   |  20.0<L>  |  0.92    Ca    8.1<L>      19 Aug 2024 03:30  Phos  2.2     08-19  Mg     2.0     08-19        Urinalysis Basic - ( 19 Aug 2024 03:30 )    Color: x / Appearance: x / SG: x / pH: x  Gluc: 107 mg/dL / Ketone: x  / Bili: x / Urobili: x   Blood: x / Protein: x / Nitrite: x   Leuk Esterase: x / RBC: x / WBC x   Sq Epi: x / Non Sq Epi: x / Bacteria: x          RADIOLOGY & ADDITIONAL STUDIES: HPI:  89M PMH AFib on Eliquis, HTN, BPH, mild cognitive impairment, presents as a fall. He fell earlier in morning day of admission in his assisted living facility and was on the floor for at least an hour (true time unknown) before an attendant found him. He was brought in for evaluation. He states he sometimes walks with a cane at home, thinks he was using it but 'slipped and fell'. Reports pain in back and ribs. Denies HS. Trauma surgery consulted, no trauma code activated.    In ED was tachycardic to the 120s, SBP was 160 and he was saturating well on RA. Eliquis and metoprolol last taken 6pm 8/15 (evening prior to presentation).  On CT Chest with IV Contrast, pt with multifragmented, displaced posterior/medial left 8th-9th rib, left T7 - T10 transverse process fractures with associated large pleural hematomas with active extravasation/hemorrhage. Because of loculated/masslike configuration of lower LEFT thoracic hematomas, extrapleural hematomas, insinuated between lower LEFT parietal pleura and intercostal muscles is an alternate possibility.  Pt admitted to Trauma service.  Pt received  K Centra 2/2 recent Eliquis.  Thoracic Surgery consulted for active extravasation seen on CT. CT Chest reviewed by Dr. Mcnamara - hematoma appears extrapleural, without free flow into the chest. Recommend IR for embolization +/- drain placement due to active extravasation seen on imaging. Presented to IR on 8/17  for angiogram and possible embolization. Multiple left intercostal arteries interrogated and angiography performed. No evidence of active bleeding noted. No embolization performed. Hemostasis achieved at the right common femoral arteriotomy via mynx closure. Patient received one unit PRBC for ABLA. Course complicated by urinary retention, started on flomax, urinary retention now resolved, voiding freely. Cardiology consulted for AFIBB. On Cardizem and Metoprolol. [id number] score 3. AC on hold due to bleeding and falls. As per patient's daughter Amanda [phone], patient  has suffered many falls previously. She is unsure if she would want AC overall as patient is very impulsive and unstable on his feet. LAAO devices  were discussed with his daughter, but she defers any procedure at this time. Will need to re-evaluate AC  prior to D/C as per Neurosurgery recommendations. Medicine consulted for Sherri-trauma evaluation.     Interval history:  Pt reports he feels better today   c/o back pain        PAST MEDICAL & SURGICAL HISTORY:   HTN (hypertension)  Afib  Chronic constipation  Insomnia  Hyperlipidemia  Anxiety and depression  Mild cognitive impairment    No significant past surgical history      HOME  MEDICATIONS    	furosemide 20 mg oral tablet: 1 tab(s) orally once a day  · 	metoprolol tartrate 50 mg oral tablet , 1 tab(s) orally 2 times a day  · 	pravastatin 80 mg oral tablet: 1 tab(s) orally once a day  · 	amLODIPine 5 mg oral tablet: 1 tab(s) orally once a day  · 	ramipril 10 mg oral tablet:  orally once a day  · 	senna (sennosides) 8.6 mg oral tablet:  1 tab(s) orally once a day  · 	Colace 100 mg oral capsule:  2 cap(s) orally once a day (at bedtime)  · 	venlafaxine 37.5 mg oral tablet:  1 tab(s) orally once a day  · 	Melatonin 3 mg oral tablet: 1 tab(s) orally once a day (at bedtime)  · 	Polyethylene Glycol 3350: 17 gram(s) orally once a day  · 	Eliquis 5 mg oral tablet: 1 tab(s) orally 2 times a day      Allergies  No Known Allergies      SOCIAL HISTORY:   Never a smoker   Denies ETOH / IVDA     FAMILY HISTORY:  No pertinent family history in first degree relatives    REVIEW OF SYSTEMS:    CONSTITUTIONAL: No weakness, fevers or chills  EYES/ENT: No visual changes;  No vertigo or throat pain   RESPIRATORY: No cough, wheezing, hemoptysis; No shortness of breath  CARDIOVASCULAR: No chest pain or palpitations  GASTROINTESTINAL: No abdominal or epigastric pain. No nausea, vomiting. No diarrhea or constipation.  GENITOURINARY: No dysuria, frequency or hematuria  NEUROLOGICAL: No numbness or weakness  Psych: No depression, anxiety  SKIN: No itching, rashes        Vital Signs Last 24 Hrs  T(C): 36.6 (20 Aug 2024 04:50), Max: 37.2 (20 Aug 2024 00:29)  T(F): 97.9 (20 Aug 2024 04:50), Max: 98.9 (20 Aug 2024 00:29)  HR: 97 (20 Aug 2024 04:50) (92 - 144)  BP: 129/75 (20 Aug 2024 04:50) (128/90 - 175/104)  BP(mean): 88 (19 Aug 2024 21:36) (88 - 144)  RR: 18 (20 Aug 2024 04:50) (15 - 38)  SpO2: 94% (20 Aug 2024 04:50) (86% - 100%)    Parameters below as of 20 Aug 2024 04:50  Patient On (Oxygen Delivery Method): room air        LABS:                        8.8    9.11  )-----------( 152      ( 20 Aug 2024 06:20 )             26.5     08-19    136  |  101  |  18.9  ----------------------------<  107<H>  3.8   |  20.0<L>  |  0.92    Ca    8.1<L>      19 Aug 2024 03:30  Phos  2.2     08-19  Mg     2.0     08-19        Urinalysis Basic - ( 19 Aug 2024 03:30 )    Color: x / Appearance: x / SG: x / pH: x  Gluc: 107 mg/dL / Ketone: x  / Bili: x / Urobili: x   Blood: x / Protein: x / Nitrite: x   Leuk Esterase: x / RBC: x / WBC x   Sq Epi: x / Non Sq Epi: x / Bacteria: x     HPI:  89M PMH AFib on Eliquis, HTN, BPH, mild cognitive impairment, presents as a fall. He fell earlier in morning day of admission in his assisted living facility and was on the floor for at least an hour (true time unknown) before an attendant found him. He was brought in for evaluation. He states he sometimes walks with a cane at home, thinks he was using it but 'slipped and fell'. Reports pain in back and ribs. Denies HS. Trauma surgery consulted, no trauma code activated.    In ED was tachycardic to the 120s, SBP was 160 and he was saturating well on RA. Eliquis and metoprolol last taken 6pm 8/15 (evening prior to presentation).  On CT Chest with IV Contrast, pt with multifragmented, displaced posterior/medial left 8th-9th rib, left T7 - T10 transverse process fractures with associated large pleural hematomas with active extravasation/hemorrhage. Because of loculated/masslike configuration of lower LEFT thoracic hematomas, extrapleural hematomas, insinuated between lower LEFT parietal pleura and intercostal muscles is an alternate possibility.  Pt admitted to Trauma service.  Pt received  K Centra 2/2 recent Eliquis.  Thoracic Surgery consulted for active extravasation seen on CT. CT Chest reviewed by Dr. Mcnamara - hematoma appears extrapleural, without free flow into the chest. Recommend IR for embolization +/- drain placement due to active extravasation seen on imaging. Presented to IR on 8/17  for angiogram and possible embolization. Multiple left intercostal arteries interrogated and angiography performed. No evidence of active bleeding noted. No embolization performed. Hemostasis achieved at the right common femoral arteriotomy via mynx closure. Patient received one unit PRBC for ABLA. Course complicated by urinary retention, started on flomax, urinary retention now resolved, voiding freely. Cardiology consulted for AFIBB. On Cardizem and Metoprolol. [id number] score 3. AC on hold due to bleeding and falls. As per patient's daughter Amanda [phone], patient  has suffered many falls previously. She is unsure if she would want AC overall as patient is very impulsive and unstable on his feet. LAAO devices  were discussed with his daughter, but she defers any procedure at this time. Will need to re-evaluate AC  prior to D/C as per Neurosurgery recommendations. Medicine consulted for Sherri-trauma evaluation.     Interval history:  Pt reports he feels better today   c/o back pain        PAST MEDICAL & SURGICAL HISTORY:   HTN (hypertension)  Afib  Chronic constipation  Insomnia  Hyperlipidemia  Anxiety and depression  Mild cognitive impairment    No significant past surgical history      HOME  MEDICATIONS    	furosemide 20 mg oral tablet: 1 tab(s) orally once a day  · 	metoprolol tartrate 50 mg oral tablet , 1 tab(s) orally 2 times a day  · 	pravastatin 80 mg oral tablet: 1 tab(s) orally once a day  · 	amLODIPine 5 mg oral tablet: 1 tab(s) orally once a day  · 	ramipril 10 mg oral tablet:  orally once a day  · 	senna (sennosides) 8.6 mg oral tablet:  1 tab(s) orally once a day  · 	Colace 100 mg oral capsule:  2 cap(s) orally once a day (at bedtime)  · 	venlafaxine 37.5 mg oral tablet:  1 tab(s) orally once a day  · 	Melatonin 3 mg oral tablet: 1 tab(s) orally once a day (at bedtime)  · 	Polyethylene Glycol 3350: 17 gram(s) orally once a day  · 	Eliquis 5 mg oral tablet: 1 tab(s) orally 2 times a day      Allergies  No Known Allergies      SOCIAL HISTORY:   Never a smoker   Denies ETOH / IVDA     FAMILY HISTORY:  No pertinent family history in first degree relatives    REVIEW OF SYSTEMS:    CONSTITUTIONAL: No weakness, fevers or chills  EYES/ENT: No visual changes;  No vertigo or throat pain   RESPIRATORY: No cough, wheezing, hemoptysis; No shortness of breath  CARDIOVASCULAR: No chest pain or palpitations  GASTROINTESTINAL: No abdominal or epigastric pain. No nausea, vomiting. No diarrhea or constipation.  GENITOURINARY: No dysuria, frequency or hematuria  NEUROLOGICAL: No numbness or weakness  Psych: No depression, anxiety  SKIN: No itching, rashes        Vital Signs Last 24 Hrs  T(C): 36.6 (20 Aug 2024 04:50), Max: 37.2 (20 Aug 2024 00:29)  T(F): 97.9 (20 Aug 2024 04:50), Max: 98.9 (20 Aug 2024 00:29)  HR: 97 (20 Aug 2024 04:50) (92 - 144)  BP: 129/75 (20 Aug 2024 04:50) (128/90 - 175/104)  BP(mean): 88 (19 Aug 2024 21:36) (88 - 144)  RR: 18 (20 Aug 2024 04:50) (15 - 38)  SpO2: 94% (20 Aug 2024 04:50) (86% - 100%)    Parameters below as of 20 Aug 2024 04:50  Patient On (Oxygen Delivery Method): room air    CONSTITUTIONAL: Awake, alert and in no apparent distress  CARDIAC: Normal rate, regular rhythm.  Heart sounds S1, S2.  RESPIRATORY: Breath sounds reduced bilaterally.   ABDOMINAL: Nontender to palpation. No rebound/ guarding   EXTREMITIES: No edema, cyanosis or deformity   NEUROLOGICAL: Alert and oriented, no focal deficits, no motor or sensory deficits         LABS:                        8.8    9.11  )-----------( 152      ( 20 Aug 2024 06:20 )             26.5     08-19    136  |  101  |  18.9  ----------------------------<  107<H>  3.8   |  20.0<L>  |  0.92    Ca    8.1<L>      19 Aug 2024 03:30  Phos  2.2     08-19  Mg     2.0     08-19        Urinalysis Basic - ( 19 Aug 2024 03:30 )    Color: x / Appearance: x / SG: x / pH: x  Gluc: 107 mg/dL / Ketone: x  / Bili: x / Urobili: x   Blood: x / Protein: x / Nitrite: x   Leuk Esterase: x / RBC: x / WBC x   Sq Epi: x / Non Sq Epi: x / Bacteria: x

## 2024-08-20 NOTE — CONSULT NOTE ADULT - CONSULT REASON
Evaluation for IR embolization
-fall with tp fractures.
pleural hematoma
Atrial Fibrillation
Sherri-trauma eval

## 2024-08-20 NOTE — PROGRESS NOTE ADULT - SUBJECTIVE AND OBJECTIVE BOX
INTERVAL HPI/OVERNIGHT EVENTS: states pain much better today compared to yesterday, mildly confused but redirectable, no overnight events    STATUS POST:  IR angio without embolization 8/16      MEDICATIONS  (STANDING):  acetaminophen     Tablet .. 975 milliGRAM(s) Oral every 8 hours  atorvastatin 20 milliGRAM(s) Oral at bedtime  diltiazem    Tablet 30 milliGRAM(s) Oral every 6 hours  enoxaparin Injectable 30 milliGRAM(s) SubCutaneous every 12 hours  lidocaine   4% Patch 2 Patch Transdermal daily  melatonin 5 milliGRAM(s) Oral at bedtime  metoprolol tartrate 50 milliGRAM(s) Oral two times a day  polyethylene glycol 3350 17 Gram(s) Oral daily  potassium phosphate IVPB 15 milliMole(s) IV Intermittent once  senna 2 Tablet(s) Oral at bedtime  tamsulosin 0.4 milliGRAM(s) Oral at bedtime  venlafaxine XR. 37.5 milliGRAM(s) Oral daily    MEDICATIONS  (PRN):  artificial  tears Solution 1 Drop(s) Both EYES every 6 hours PRN Dry Eyes      Vital Signs Last 24 Hrs  T(C): 36.9 (20 Aug 2024 09:11), Max: 37.2 (20 Aug 2024 00:29)  T(F): 98.5 (20 Aug 2024 09:11), Max: 98.9 (20 Aug 2024 00:29)  HR: 80 (20 Aug 2024 09:11) (80 - 144)  BP: 156/73 (20 Aug 2024 09:11) (128/90 - 170/88)  BP(mean): 88 (19 Aug 2024 21:36) (88 - 117)  RR: 18 (20 Aug 2024 09:11) (18 - 38)  SpO2: 94% (20 Aug 2024 09:11) (92% - 100%)    Parameters below as of 20 Aug 2024 09:11  Patient On (Oxygen Delivery Method): room air        PHYSICAL EXAM:      Constitutional: NAD    Respiratory: no accessory muscle use or conversational dyspnea    Cardiovascular: RRR    Gastrointestinal: dark ecchymosis L abdomen, soft, NT/ND    Extremities: DOYLE          I&O's Detail    19 Aug 2024 07:01  -  20 Aug 2024 07:00  --------------------------------------------------------  IN:    Oral Fluid: 100 mL  Total IN: 100 mL    OUT:    Voided (mL): 1400 mL  Total OUT: 1400 mL    Total NET: -1300 mL          LABS:                        8.8    9.11  )-----------( 152      ( 20 Aug 2024 06:20 )             26.5     08-20    136  |  104  |  17.6  ----------------------------<  93  3.8   |  19.0<L>  |  0.86    Ca    7.9<L>      20 Aug 2024 06:20  Phos  2.3     08-20  Mg     2.1     08-20        Urinalysis Basic - ( 20 Aug 2024 06:20 )    Color: x / Appearance: x / SG: x / pH: x  Gluc: 93 mg/dL / Ketone: x  / Bili: x / Urobili: x   Blood: x / Protein: x / Nitrite: x   Leuk Esterase: x / RBC: x / WBC x   Sq Epi: x / Non Sq Epi: x / Bacteria: x        RADIOLOGY & ADDITIONAL STUDIES:

## 2024-08-20 NOTE — PROGRESS NOTE ADULT - ASSESSMENT
89M PMHx AFib on Eliquis, HTN, BPH, HLD, CKD, depression, mild cognitive impairment, presented on 8/16/24 after a fall at his assisted living facility, found to have Left 8 and 9 rib fractures, left T7-T10 transverse process fractures, and left extrapleural hematoma with active extravasation.    Plan:  - Continue to hold eliquis and lasix  - Cardio considering whether to restart Eliquis, discussion being had with daughter  - Continue home meds  - TLSO for comfort  - multimodal pain regimen  - Geriatrics consult called  - encourage IS  - DVT ppx  - PT consult for dispo

## 2024-08-20 NOTE — CONSULT NOTE ADULT - ASSESSMENT
89M PMH AFib on Eliquis, HTN, BPH, mild cognitive impairment, presents as a fall. He fell earlier in morning day of admission in his assisted living facility and was on the floor for at least an hour (true time unknown) before an attendant found him. He was brought in for evaluation. He states he sometimes walks with a cane at home, thinks he was using it but 'slipped and fell'. Reports pain in back and ribs. Denies HS. Trauma surgery consulted, no trauma code activated.    In ED was tachycardic to the 120s, SBP was 160 and he was saturating well on RA. Eliquis and metoprolol last taken 6pm 8/15 (evening prior to presentation).  On CT Chest with IV Contrast, pt with multifragmented, displaced posterior/medial left 8th-9th rib, left T7 - T10 transverse process fractures with associated large pleural hematomas with active extravasation/hemorrhage. Because of loculated/masslike configuration of lower LEFT thoracic hematomas, extrapleural hematomas, insinuated between lower LEFT parietal pleura and intercostal muscles is an alternate possibility.  Pt admitted to Trauma service.  Pt received  KCentra 2/2 recent Eliquis.  Thoracic Surgery consulted for active extravasation seen on CT. CT Chest reviewed by Dr. Mcnamara - hematoma appears extrapleural, without free flow into the chest. Recommend IR for embolization +/- drain placement due to active extravasation seen on imaging. Presented to IR on 8/17  for angiogram and possible embolization. Multiple left intercostal arteries interrogated and angiography performed. No evidence of active bleeding noted. No embolization performed. Hemostasis achieved at the right common femoral arteriotomy via mynx closure. Patient received one unit PRBC for ABLA. Course complicated by urinary retention, started on flomax, urinary retention now resolved, voiding freely. Cardiology consulted for AFIBB. On Cardizem and Metoprolol. [id number] score 3.  AC on hold due to bleeding and falls. As per patient's daughter Amanda [phone], patient  has suffered many falls previously. She is unsure if she would want AC overall as patient is very impulsive and unstable on his feet. LAAO devices  were discussed with his daughter, but she defers any procedure at this time. Will need to re-evaluate AC  prior to D/C as per Neurosurgery recommendations. Medicine consulted for Sherri-trauma evaluation.  89 M PMH AFib on Eliquis, HTN, BPH, mild cognitive impairment, presents as a fall. He fell earlier in morning day of admission in his assisted living facility and was on the floor for at least an hour (true time unknown) before an attendant found him. He was brought in for evaluation. He states he sometimes walks with a cane at home, thinks he was using it but 'slipped and fell'. Reports pain in back and ribs. Denies HS. Trauma surgery consulted, no trauma code activated.  In ED was tachycardic to the 120s, SBP was 160 and he was saturating well on RA. Eliquis and metoprolol last taken 6pm 8/15 (evening prior to presentation).  On CT Chest with IV Contrast, pt with multifragmented, displaced posterior/medial left 8th-9th rib, left T7 - T10 transverse process fractures with associated large pleural hematomas with active extravasation/hemorrhage. Because of loculated/masslike configuration of lower LEFT thoracic hematomas, extrapleural hematomas, insinuated between lower LEFT parietal pleura and intercostal muscles is an alternate possibility.  Pt admitted to Trauma service.  Pt received  KCentra 2/2 recent Eliquis.  Thoracic Surgery consulted for active extravasation seen on CT. CT Chest reviewed by Dr. Mcnamara - hematoma appears extrapleural, without free flow into the chest. Recommend IR for embolization +/- drain placement due to active extravasation seen on imaging. Presented to IR on 8/17  for angiogram and possible embolization. Multiple left intercostal arteries interrogated and angiography performed. No evidence of active bleeding noted. No embolization performed. Hemostasis achieved at the right common femoral arteriotomy via mynx closure. Patient received one unit PRBC for ABLA. Course complicated by urinary retention, started on flomax, urinary retention now resolved, voiding freely. Cardiology consulted for AFIBB. On Cardizem and Metoprolol. [id number] score 3.  AC on hold due to bleeding and falls. As per patient's daughter Amanda [phone], patient  has suffered many falls previously. She is unsure if she would want AC overall as patient is very impulsive and unstable on his feet. LAAO devices  were discussed with his daughter, but she defers any procedure at this time. Will need to re-evaluate AC  prior to D/C as per Neurosurgery recommendations. Medicine consulted for Sherri-trauma evaluation.     Trauma s/p fall   - left 8th-9th rib, left T7 - T10 transverse process fractures with associated large pleural hematomas   - S/p angio with IR on 8/16, no evidence of active bleeding  - CT surgery on board      A fib   - AC reversed on admission  - c/w Metoprolol and Cardizem   - Cardio consulted, daughter deferred LAAO device procedure   - No AC at this time, will need to re-evaluate prior to DC     Acute blood loss anemia  - s/p KCentra   - s/p 1 unit PRBC   - Monitor CBC     BPH  - c/w Flomax       Identification of Seniors at Risk:                                                                                                                                       Before the event that brought you to the hospital, did you need someone to help you on a regular basis?     Yes  In the 24 hours before your injury, have you needed more help than usual?        No                                                    Have you been hospitalized for one or more nights during the past six months?      Yes                                                In general, do you have serious problems with your vision that cannot be corrected with glasses?       No               In general, do you have serious problems with your memory?      No                                                                              Do you take six or more different medications every day?        Yes                                                                                        A positive screen is an answer of yes to 2 or more - Total:  3

## 2024-08-21 ENCOUNTER — TRANSCRIPTION ENCOUNTER (OUTPATIENT)
Age: 89
End: 2024-08-21

## 2024-08-21 VITALS
SYSTOLIC BLOOD PRESSURE: 138 MMHG | OXYGEN SATURATION: 94 % | TEMPERATURE: 98 F | HEART RATE: 87 BPM | RESPIRATION RATE: 17 BRPM | DIASTOLIC BLOOD PRESSURE: 77 MMHG

## 2024-08-21 LAB
ACETONE SERPL-MCNC: NEGATIVE — SIGNIFICANT CHANGE UP
ANION GAP SERPL CALC-SCNC: 14 MMOL/L — SIGNIFICANT CHANGE UP (ref 5–17)
BUN SERPL-MCNC: 17.1 MG/DL — SIGNIFICANT CHANGE UP (ref 8–20)
CALCIUM SERPL-MCNC: 7.6 MG/DL — LOW (ref 8.4–10.5)
CHLORIDE SERPL-SCNC: 105 MMOL/L — SIGNIFICANT CHANGE UP (ref 96–108)
CO2 SERPL-SCNC: 19 MMOL/L — LOW (ref 22–29)
CREAT SERPL-MCNC: 0.92 MG/DL — SIGNIFICANT CHANGE UP (ref 0.5–1.3)
EGFR: 80 ML/MIN/1.73M2 — SIGNIFICANT CHANGE UP
GLUCOSE SERPL-MCNC: 96 MG/DL — SIGNIFICANT CHANGE UP (ref 70–99)
MAGNESIUM SERPL-MCNC: 1.9 MG/DL — SIGNIFICANT CHANGE UP (ref 1.6–2.6)
PHOSPHATE SERPL-MCNC: 2.8 MG/DL — SIGNIFICANT CHANGE UP (ref 2.4–4.7)
POTASSIUM SERPL-MCNC: 3.6 MMOL/L — SIGNIFICANT CHANGE UP (ref 3.5–5.3)
POTASSIUM SERPL-SCNC: 3.6 MMOL/L — SIGNIFICANT CHANGE UP (ref 3.5–5.3)
SODIUM SERPL-SCNC: 138 MMOL/L — SIGNIFICANT CHANGE UP (ref 135–145)

## 2024-08-21 PROCEDURE — 99232 SBSQ HOSP IP/OBS MODERATE 35: CPT

## 2024-08-21 RX ORDER — RAMIPRIL 10 MG
10 CAPSULE ORAL
Qty: 0 | Refills: 0 | DISCHARGE
Start: 2024-08-21

## 2024-08-21 RX ORDER — POTASSIUM CHLORIDE 10 MEQ
40 TABLET, EXT RELEASE, PARTICLES/CRYSTALS ORAL ONCE
Refills: 0 | Status: COMPLETED | OUTPATIENT
Start: 2024-08-21 | End: 2024-08-21

## 2024-08-21 RX ORDER — RAMIPRIL 10 MG
0 CAPSULE ORAL
Refills: 0 | DISCHARGE

## 2024-08-21 RX ORDER — ACETAMINOPHEN 325 MG/1
3 TABLET ORAL
Qty: 0 | Refills: 0 | DISCHARGE
Start: 2024-08-21

## 2024-08-21 RX ORDER — DILTIAZEM HYDROCHLORIDE 5 MG/ML
1 INJECTION INTRAVENOUS
Qty: 0 | Refills: 0 | DISCHARGE
Start: 2024-08-21

## 2024-08-21 RX ORDER — AMLODIPINE BESYLATE 10 MG/1
1 TABLET ORAL
Refills: 0 | DISCHARGE

## 2024-08-21 RX ORDER — APIXABAN 5 MG/1
1 TABLET, FILM COATED ORAL
Refills: 0 | DISCHARGE

## 2024-08-21 RX ORDER — LIDOCAINE/BENZALKONIUM/ALCOHOL
1 SOLUTION, NON-ORAL TOPICAL
Qty: 0 | Refills: 0 | DISCHARGE
Start: 2024-08-21

## 2024-08-21 RX ADMIN — DILTIAZEM HYDROCHLORIDE 30 MILLIGRAM(S): 5 INJECTION INTRAVENOUS at 05:24

## 2024-08-21 RX ADMIN — ACETAMINOPHEN 975 MILLIGRAM(S): 325 TABLET ORAL at 05:24

## 2024-08-21 RX ADMIN — Medication 40 MILLIEQUIVALENT(S): at 06:15

## 2024-08-21 RX ADMIN — Medication 2 PATCH: at 00:34

## 2024-08-21 RX ADMIN — ACETAMINOPHEN 975 MILLIGRAM(S): 325 TABLET ORAL at 13:32

## 2024-08-21 RX ADMIN — METOPROLOL TARTRATE 50 MILLIGRAM(S): 100 TABLET ORAL at 05:24

## 2024-08-21 RX ADMIN — ENOXAPARIN SODIUM 30 MILLIGRAM(S): 100 INJECTION SUBCUTANEOUS at 05:24

## 2024-08-21 RX ADMIN — DILTIAZEM HYDROCHLORIDE 30 MILLIGRAM(S): 5 INJECTION INTRAVENOUS at 12:23

## 2024-08-21 RX ADMIN — VENLAFAXINE HYDROCHLORIDE 37.5 MILLIGRAM(S): 150 CAPSULE, EXTENDED RELEASE ORAL at 12:23

## 2024-08-21 RX ADMIN — DILTIAZEM HYDROCHLORIDE 30 MILLIGRAM(S): 5 INJECTION INTRAVENOUS at 00:14

## 2024-08-21 NOTE — DISCHARGE NOTE NURSING/CASE MANAGEMENT/SOCIAL WORK - NSDCCRNAME_GEN_ALL_CORE_FT
[facility] at [facility]  [place] [address]

## 2024-08-21 NOTE — DISCHARGE NOTE NURSING/CASE MANAGEMENT/SOCIAL WORK - PATIENT PORTAL LINK FT
You can access the FollowMyHealth Patient Portal offered by [facility] by registering at the following website: [email or web address]. By joining [facility]’s FollowMyHealth portal, you will also be able to view your health information using other applications (apps) compatible with our system.

## 2024-08-21 NOTE — DISCHARGE NOTE NURSING/CASE MANAGEMENT/SOCIAL WORK - NSDCPEFALRISK_GEN_ALL_CORE
For information on Fall & Injury Prevention, visit: [email or web address] OR  [email or web address] OR  https://www.cdc.gov/steadi/patient.html

## 2024-08-21 NOTE — PROGRESS NOTE ADULT - ASSESSMENT
89M PMHx AFib on Eliquis, HTN, BPH, HLD, CKD, depression, mild cognitive impairment, presented on 8/16/24 after a fall at his assisted living facility, found to have Left 8 and 9 rib fractures, left T7-T10 transverse process fractures, and left extrapleural hematoma with active extravasation.    Plan:  - Continue to hold eliquis and lasix  - Cardio considering whether to restart Eliquis, discussion being had with daughter  - Continue home meds  - TLSO for comfort  - multimodal pain regimen  - Geriatrics consult called  - encourage IS  - DVT ppx  - PT consult for dispo   89M PMHx AFib on Eliquis, HTN, BPH, HLD, CKD, depression, mild cognitive impairment, presented on 8/16/24 after a fall at his assisted living facility, found to have Left 8 and 9 rib fractures, left T7-T10 transverse process fractures, and left extrapleural hematoma with active extravasation.    Plan:  - Continue to hold eliquis and lasix  - Cardio considering whether to restart Eliquis, discussion being had with daughter  - Continue home meds  - TLSO for comfort  - multimodal pain regimen  - Geriatrics eval done  - encourage IS  - DVT ppx  - PT consult for dispo

## 2024-08-21 NOTE — PROGRESS NOTE ADULT - PROVIDER SPECIALTY LIST ADULT
SICU
Intervent Radiology
Neurosurgery
Thoracic Surgery
Neurosurgery
SICU
Thoracic Surgery
Neurosurgery
SICU
Thoracic Surgery
Trauma Surgery
Trauma Surgery

## 2024-08-21 NOTE — PROGRESS NOTE ADULT - REASON FOR ADMISSION
Polytrauma

## 2024-08-21 NOTE — PROGRESS NOTE ADULT - SUBJECTIVE AND OBJECTIVE BOX
INTERVAL HPI/OVERNIGHT EVENTS: states pain much better today compared to yesterday, mildly confused but redirectable, no overnight events    STATUS POST:  IR angio without embolization 8/16      MEDICATIONS  (STANDING):  acetaminophen     Tablet .. 975 milliGRAM(s) Oral every 8 hours  atorvastatin 20 milliGRAM(s) Oral at bedtime  diltiazem    Tablet 30 milliGRAM(s) Oral every 6 hours  enoxaparin Injectable 30 milliGRAM(s) SubCutaneous every 12 hours  lidocaine   4% Patch 2 Patch Transdermal daily  melatonin 5 milliGRAM(s) Oral at bedtime  metoprolol tartrate 50 milliGRAM(s) Oral two times a day  polyethylene glycol 3350 17 Gram(s) Oral daily  potassium phosphate IVPB 15 milliMole(s) IV Intermittent once  senna 2 Tablet(s) Oral at bedtime  tamsulosin 0.4 milliGRAM(s) Oral at bedtime  venlafaxine XR. 37.5 milliGRAM(s) Oral daily    MEDICATIONS  (PRN):  artificial  tears Solution 1 Drop(s) Both EYES every 6 hours PRN Dry Eyes      Vital Signs Last 24 Hrs  T(C): 36.9 (20 Aug 2024 09:11), Max: 37.2 (20 Aug 2024 00:29)  T(F): 98.5 (20 Aug 2024 09:11), Max: 98.9 (20 Aug 2024 00:29)  HR: 80 (20 Aug 2024 09:11) (80 - 144)  BP: 156/73 (20 Aug 2024 09:11) (128/90 - 170/88)  BP(mean): 88 (19 Aug 2024 21:36) (88 - 117)  RR: 18 (20 Aug 2024 09:11) (18 - 38)  SpO2: 94% (20 Aug 2024 09:11) (92% - 100%)    Parameters below as of 20 Aug 2024 09:11  Patient On (Oxygen Delivery Method): room air        PHYSICAL EXAM:      Constitutional: NAD    Respiratory: no accessory muscle use or conversational dyspnea    Cardiovascular: RRR    Gastrointestinal: dark ecchymosis L abdomen, soft, NT/ND    Extremities: DOYLE          I&O's Detail    19 Aug 2024 07:01  -  20 Aug 2024 07:00  --------------------------------------------------------  IN:    Oral Fluid: 100 mL  Total IN: 100 mL    OUT:    Voided (mL): 1400 mL  Total OUT: 1400 mL    Total NET: -1300 mL          LABS:                        8.8    9.11  )-----------( 152      ( 20 Aug 2024 06:20 )             26.5     08-20    136  |  104  |  17.6  ----------------------------<  93  3.8   |  19.0<L>  |  0.86    Ca    7.9<L>      20 Aug 2024 06:20  Phos  2.3     08-20  Mg     2.1     08-20        Urinalysis Basic - ( 20 Aug 2024 06:20 )    Color: x / Appearance: x / SG: x / pH: x  Gluc: 93 mg/dL / Ketone: x  / Bili: x / Urobili: x   Blood: x / Protein: x / Nitrite: x   Leuk Esterase: x / RBC: x / WBC x   Sq Epi: x / Non Sq Epi: x / Bacteria: x        RADIOLOGY & ADDITIONAL STUDIES: INTERVAL HPI/OVERNIGHT EVENTS: No acute events ovenight  STATUS POST:  IR angio without embolization 8/16      ICU Vital Signs Last 24 Hrs  T(C): 36.3 (21 Aug 2024 08:00), Max: 36.9 (20 Aug 2024 09:11)  T(F): 97.4 (21 Aug 2024 08:00), Max: 98.5 (20 Aug 2024 09:11)  HR: 80 (21 Aug 2024 08:00) (78 - 97)  BP: 146/76 (21 Aug 2024 08:00) (134/83 - 156/73)  BP(mean): 100 (20 Aug 2024 20:10) (100 - 100)  ABP: --  ABP(mean): --  RR: 18 (21 Aug 2024 08:00) (18 - 18)  SpO2: 92% (21 Aug 2024 08:00) (92% - 94%)    O2 Parameters below as of 21 Aug 2024 08:00  Patient On (Oxygen Delivery Method): room air          I&O's Detail    20 Aug 2024 07:01  -  21 Aug 2024 07:00  --------------------------------------------------------  IN:  Total IN: 0 mL    OUT:    Voided (mL): 850 mL  Total OUT: 850 mL    Total NET: -850 mL            MEDICATIONS  (STANDING):  acetaminophen     Tablet .. 975 milliGRAM(s) Oral every 8 hours  atorvastatin 20 milliGRAM(s) Oral at bedtime  diltiazem    Tablet 30 milliGRAM(s) Oral every 6 hours  enoxaparin Injectable 30 milliGRAM(s) SubCutaneous every 12 hours  lidocaine   4% Patch 2 Patch Transdermal daily  melatonin 5 milliGRAM(s) Oral at bedtime  metoprolol tartrate 50 milliGRAM(s) Oral two times a day  polyethylene glycol 3350 17 Gram(s) Oral daily  senna 2 Tablet(s) Oral at bedtime  tamsulosin 0.4 milliGRAM(s) Oral at bedtime  venlafaxine XR. 37.5 milliGRAM(s) Oral daily    MEDICATIONS  (PRN):  artificial  tears Solution 1 Drop(s) Both EYES every 6 hours PRN Dry Eyes              PHYSICAL EXAM:      Constitutional: NAD    Neuro:  GCS 15, baseline dementia, A&O x1-2    Respiratory: Unlabored on RA    Cardiovascular: RRR    Gastrointestinal: dark ecchymosis L abdomen, soft, NT/ND    Extremities: DOYLE    Skin:  Warm, dry, intact    :  Voids                LABS:                                   8.8    9.11  )-----------( 152      ( 20 Aug 2024 06:20 )             26.5                     08-21    138  |  105  |  17.1  ----------------------------<  96  3.6   |  19.0<L>  |  0.92    Ca    7.6<L>      21 Aug 2024 04:00  Phos  2.8     08-21  Mg     1.9     08-21            Urinalysis Basic - ( 20 Aug 2024 06:20 )    Color: x / Appearance: x / SG: x / pH: x  Gluc: 93 mg/dL / Ketone: x  / Bili: x / Urobili: x   Blood: x / Protein: x / Nitrite: x   Leuk Esterase: x / RBC: x / WBC x   Sq Epi: x / Non Sq Epi: x / Bacteria: x        RADIOLOGY & ADDITIONAL STUDIES:

## 2024-08-21 NOTE — PROGRESS NOTE ADULT - NUTRITIONAL ASSESSMENT
This patient has been assessed with a concern for Malnutrition and has been determined to have a diagnosis/diagnoses of Moderate protein-calorie malnutrition.    This patient is being managed with:   Diet Regular-  Entered: Aug 17 2024 10:32AM  

## 2024-09-10 PROBLEM — K59.09 OTHER CONSTIPATION: Chronic | Status: ACTIVE | Noted: 2024-08-16

## 2024-09-10 PROBLEM — I10 ESSENTIAL (PRIMARY) HYPERTENSION: Chronic | Status: ACTIVE | Noted: 2024-08-16

## 2024-09-10 PROBLEM — I48.91 UNSPECIFIED ATRIAL FIBRILLATION: Chronic | Status: ACTIVE | Noted: 2024-08-16

## 2024-09-10 PROBLEM — F41.9 ANXIETY DISORDER, UNSPECIFIED: Chronic | Status: ACTIVE | Noted: 2024-08-16

## 2024-09-10 PROBLEM — G47.00 INSOMNIA, UNSPECIFIED: Chronic | Status: ACTIVE | Noted: 2024-08-16

## 2024-09-10 PROBLEM — G31.84 MILD COGNITIVE IMPAIRMENT OF UNCERTAIN OR UNKNOWN ETIOLOGY: Chronic | Status: ACTIVE | Noted: 2024-08-16

## 2024-09-10 PROBLEM — E78.5 HYPERLIPIDEMIA, UNSPECIFIED: Chronic | Status: ACTIVE | Noted: 2024-08-16

## 2024-09-13 ENCOUNTER — INPATIENT (INPATIENT)
Facility: HOSPITAL | Age: 89
LOS: 5 days | Discharge: NURSING, REHAB OR LONG-TERM CARE | DRG: 72 | End: 2024-09-19
Attending: GENERAL ACUTE CARE HOSPITAL | Admitting: STUDENT IN AN ORGANIZED HEALTH CARE EDUCATION/TRAINING PROGRAM
Payer: MEDICARE

## 2024-09-13 VITALS
TEMPERATURE: 98 F | HEIGHT: 67 IN | SYSTOLIC BLOOD PRESSURE: 115 MMHG | RESPIRATION RATE: 18 BRPM | DIASTOLIC BLOOD PRESSURE: 72 MMHG | WEIGHT: 164.91 LBS | OXYGEN SATURATION: 95 % | HEART RATE: 91 BPM

## 2024-09-13 LAB
ALBUMIN SERPL ELPH-MCNC: 2.5 G/DL — LOW (ref 3.3–5.2)
ALP SERPL-CCNC: 126 U/L — HIGH (ref 40–120)
ALT FLD-CCNC: 12 U/L — SIGNIFICANT CHANGE UP
ANION GAP SERPL CALC-SCNC: 14 MMOL/L — SIGNIFICANT CHANGE UP (ref 5–17)
APTT BLD: 33.5 SEC — SIGNIFICANT CHANGE UP (ref 24.5–35.6)
AST SERPL-CCNC: 39 U/L — SIGNIFICANT CHANGE UP
BASOPHILS # BLD AUTO: 0.07 K/UL — SIGNIFICANT CHANGE UP (ref 0–0.2)
BASOPHILS NFR BLD AUTO: 0.6 % — SIGNIFICANT CHANGE UP (ref 0–2)
BILIRUB SERPL-MCNC: 1.2 MG/DL — SIGNIFICANT CHANGE UP (ref 0.4–2)
BUN SERPL-MCNC: 40.8 MG/DL — HIGH (ref 8–20)
CALCIUM SERPL-MCNC: 8.6 MG/DL — SIGNIFICANT CHANGE UP (ref 8.4–10.5)
CHLORIDE SERPL-SCNC: 105 MMOL/L — SIGNIFICANT CHANGE UP (ref 96–108)
CO2 SERPL-SCNC: 22 MMOL/L — SIGNIFICANT CHANGE UP (ref 22–29)
CREAT SERPL-MCNC: 1.73 MG/DL — HIGH (ref 0.5–1.3)
EGFR: 37 ML/MIN/1.73M2 — LOW
EOSINOPHIL # BLD AUTO: 0.22 K/UL — SIGNIFICANT CHANGE UP (ref 0–0.5)
EOSINOPHIL NFR BLD AUTO: 1.8 % — SIGNIFICANT CHANGE UP (ref 0–6)
GLUCOSE SERPL-MCNC: 109 MG/DL — HIGH (ref 70–99)
HCT VFR BLD CALC: 42.3 % — SIGNIFICANT CHANGE UP (ref 39–50)
HGB BLD-MCNC: 13.6 G/DL — SIGNIFICANT CHANGE UP (ref 13–17)
IMM GRANULOCYTES NFR BLD AUTO: 1.2 % — HIGH (ref 0–0.9)
INR BLD: 1.27 RATIO — HIGH (ref 0.85–1.18)
LYMPHOCYTES # BLD AUTO: 1.73 K/UL — SIGNIFICANT CHANGE UP (ref 1–3.3)
LYMPHOCYTES # BLD AUTO: 14.4 % — SIGNIFICANT CHANGE UP (ref 13–44)
MCHC RBC-ENTMCNC: 30.1 PG — SIGNIFICANT CHANGE UP (ref 27–34)
MCHC RBC-ENTMCNC: 32.2 GM/DL — SIGNIFICANT CHANGE UP (ref 32–36)
MCV RBC AUTO: 93.6 FL — SIGNIFICANT CHANGE UP (ref 80–100)
MONOCYTES # BLD AUTO: 1.38 K/UL — HIGH (ref 0–0.9)
MONOCYTES NFR BLD AUTO: 11.5 % — SIGNIFICANT CHANGE UP (ref 2–14)
NEUTROPHILS # BLD AUTO: 8.45 K/UL — HIGH (ref 1.8–7.4)
NEUTROPHILS NFR BLD AUTO: 70.5 % — SIGNIFICANT CHANGE UP (ref 43–77)
NT-PROBNP SERPL-SCNC: HIGH PG/ML (ref 0–300)
PLATELET # BLD AUTO: 204 K/UL — SIGNIFICANT CHANGE UP (ref 150–400)
POTASSIUM SERPL-MCNC: 4.6 MMOL/L — SIGNIFICANT CHANGE UP (ref 3.5–5.3)
POTASSIUM SERPL-SCNC: 4.6 MMOL/L — SIGNIFICANT CHANGE UP (ref 3.5–5.3)
PROT SERPL-MCNC: 6.4 G/DL — LOW (ref 6.6–8.7)
PROTHROM AB SERPL-ACNC: 14 SEC — HIGH (ref 9.5–13)
RBC # BLD: 4.52 M/UL — SIGNIFICANT CHANGE UP (ref 4.2–5.8)
RBC # FLD: 16.5 % — HIGH (ref 10.3–14.5)
SODIUM SERPL-SCNC: 141 MMOL/L — SIGNIFICANT CHANGE UP (ref 135–145)
TROPONIN T, HIGH SENSITIVITY RESULT: 71 NG/L — HIGH (ref 0–51)
WBC # BLD: 11.99 K/UL — HIGH (ref 3.8–10.5)
WBC # FLD AUTO: 11.99 K/UL — HIGH (ref 3.8–10.5)

## 2024-09-13 PROCEDURE — 70450 CT HEAD/BRAIN W/O DYE: CPT | Mod: 26,MC

## 2024-09-13 PROCEDURE — 99285 EMERGENCY DEPT VISIT HI MDM: CPT | Mod: GC

## 2024-09-13 PROCEDURE — 71045 X-RAY EXAM CHEST 1 VIEW: CPT | Mod: 26

## 2024-09-13 PROCEDURE — 36410 VNPNXR 3YR/> PHY/QHP DX/THER: CPT | Mod: GC

## 2024-09-13 NOTE — ED ADULT NURSE NOTE - CHIEF COMPLAINT QUOTE
pt BIBA from [facility] [place], as per EMS pt needs "plural effusion drained" as per chart from NH, pt had xray preformed at facility s/p tx for PNA. xray showered Large left plural effusion. pt a&ox1, hx of dementia.

## 2024-09-13 NOTE — ED ADULT NURSE NOTE - OBJECTIVE STATEMENT
Assumed care at 1850 pt AOX1 poor historian denies any pain or symptoms, as per NH pt found to have fluid in his lungs on x-ray, needs pleural effusion drained

## 2024-09-13 NOTE — ED PROCEDURE NOTE - CPROC ED INFUS LINE DETAIL1
The location was identified, and the area was draped and prepped./The catheter was placed using sterile technique./Ultrasound guidance was used. The location was identified, and the area was draped and prepped./Ultrasound guidance was used.

## 2024-09-13 NOTE — ED PROVIDER NOTE - OBJECTIVE STATEMENT
88 yo m pmh compression fracture,  afib no longher on eliquis since recent fall, htn, bph presents with AMS and left sided pleural effusion vs hematoma. patient was recently admitted to trauma service for multiple rib fractures and throacic transverse process fractures. eliquis reversed with k centra, had L extrapleural hematoma with active extravasation, s/p 1 u prbc. course complicated by urinary retention andAKI as well as rapid afib. Case discussed with RN at [facility] who states patient has had trouble swallowing x 6 days, placed on nectar thick liquids, and appears a bit more lethargic than baseline for the last few days. Had been on levaquin for PNA x 10 days, last dose 9/10 as well as duonebs. had ct yesterday which showedleft pleural effusion with high denisity component compatabile with hemothorax with deviation of trachea to the right. patient is A&O to self, endorsing SOB but no pain, no chest pain. No recent falls per RN at [facility].

## 2024-09-13 NOTE — ED PROVIDER NOTE - CLINICAL SUMMARY MEDICAL DECISION MAKING FREE TEXT BOX
hx and physical as noted above. will consult thoracic given patients recent history, patient is mildly tachypneic but otherwise hemodynamically stable, has not been on AC per med reconciliation and discussion with RN at [facility], no recent trauma. CT head given progressive decline and difficulty swallowing. anticipate admission

## 2024-09-13 NOTE — ED PROCEDURE NOTE - NS ED PROC PERFORMED BY1 FT
Routine Office Visit    Patient Name: Valarie Bermeo    : 1951  MRN: [id number]    Subjective:  Valarie is a 68 y.o. female who presents today for:   Chief Complaint   Patient presents with    Diabetes     pt reported 152 fasting blood sugar this AM    Follow-up       68-year-old female comes in for follow-up on her diabetes and high blood pressure.  She does report that she has been taking the Jardiance and has notices this makes a difference.  Today she admits when her sugars are on the lower side meaning the low 100 she skips her Lantus.  The she states that she was not aware that she is supposed to take it every day even when his sugars are fine.  However, but the next day the sugars are back up.  She is taking her metformin, and her pravastatin.  She reports that she has been seeing the neurologist for chronic headaches, and recently she had her nortriptyline change from a oral pills to solution.  She is to start that today.  She is no longer taking fluoxetine or citalopram.  She is taking her levothyroxine as prescribed without any concerns.  Patient is requesting a refill on her meclizine which she takes for occasional dizziness.  She states that when she gets disease she takes a pill and could be several weeks or months before she needs another dose.    Past Medical History  Past Medical History:   Diagnosis Date    Abdominal adhesions     h/o    Chronic tension headaches     Chronic venous insufficiency     s/p left endovasular laser    Deep vein thrombosis     Diabetes mellitus type II     DVT (deep venous thrombosis)     recurrent on coumadin    Heel spur     Hypothyroidism     thyroid nodule    Obesity     Observed sleep apnea     using c-pap    Postmenopausal     Recurrent UTI        Past Surgical History  Past Surgical History:   Procedure Laterality Date    CATARACT EXTRACTION Bilateral     Dr. Silva     SECTION      endovascular      HYSTERECTOMY      OOPHORECTOMY       screening colon N/A 7/9/2014    Performed by Bruce Marcus MD at [facility] END[facility]        Family History  Family History   Problem Relation Age of Onset    COPD Mother     Cataracts Mother     COPD Father     Diabetes Father     Hypertension Father     Cataracts Father     Diabetes Sister     No Known Problems Brother     No Known Problems Maternal Aunt     No Known Problems Maternal Uncle     No Known Problems Paternal Aunt     No Known Problems Paternal Uncle     No Known Problems Maternal Grandmother     No Known Problems Maternal Grandfather     No Known Problems Paternal Grandmother     No Known Problems Paternal Grandfather     Amblyopia Neg Hx     Blindness Neg Hx     Cancer Neg Hx     Glaucoma Neg Hx     Macular degeneration Neg Hx     Retinal detachment Neg Hx     Strabismus Neg Hx     Stroke Neg Hx     Thyroid disease Neg Hx        Social History  Social History     Socioeconomic History    Marital status:      Spouse name: Not on file    Number of children: Not on file    Years of education: Not on file    Highest education level: Not on file   Social Needs    Financial resource strain: Not on file    Food insecurity - worry: Not on file    Food insecurity - inability: Not on file    Transportation needs - medical: Not on file    Transportation needs - non-medical: Not on file   Occupational History    Occupation: retired     Employer: [employer]   Tobacco Use    Smoking status: Never Smoker    Smokeless tobacco: Never Used   Substance and Sexual Activity    Alcohol use: No    Drug use: No    Sexual activity: Yes     Partners: Male   Other Topics Concern    Not on file   Social History Narrative    .  Two children.         Current Medications  Current Outpatient Medications on File Prior to Visit   Medication Sig Dispense Refill    ammonium lactate (LAC-HYDRIN) 12 % lotion Apply topically as needed for Dry Skin. 400 g 5    clobetasol 0.05%  "(TEMOVATE) 0.05 % Oint Apply to affected area nightly x 4 weeks, then every other night x 4 weeks, then twice weekly x 4 weeks 60 g 2    clotrimazole 1 % Oint Apply to affected skin twice daily x 14 days 56.7 g 0    fluticasone (FLONASE) 50 mcg/actuation nasal spray 1 spray by Each Nare route once daily. 16 g 3    LANTUS SOLOSTAR U-100 INSULIN glargine 100 units/mL (3mL) SubQ pen ADMINISTER 40 UNITS UNDER THE SKIN EVERY DAY 6 mL 0    levothyroxine (SYNTHROID) 50 MCG tablet TAKE 1 TABLET BY MOUTH EVERY DAY 30 tablet 5    loratadine (CLARITIN) 10 mg tablet Take 1 tablet (10 mg total) by mouth daily as needed for Allergies (or runny nose). 90 tablet 0    metFORMIN (GLUCOPHAGE) 850 MG tablet TAKE 1 TABLET BY MOUTH TWICE DAILY WITH MEALS 180 tablet 0    mupirocin (BACTROBAN) 2 % ointment Apply to affected area 3 times daily 22 g 1    nortriptyline (PAMELOR) 10 mg/5 mL Soln Take 2.5 mLs (5 mg total) by mouth every evening. 75 mL 2    pen needle, diabetic (NOVOFINE 32) 32 gauge x 1/4" Ndle TEST THREE TIMES DAILY 100 each 5    tiZANidine (ZANAFLEX) 4 MG tablet TAKE 1 TABLET(4 MG) BY MOUTH EVERY EVENING 90 tablet 0    warfarin (COUMADIN) 5 MG tablet TAKE 1/2 TABLET BY MOUTH ON THURSDAY, AND 1 TABLET BY MOUTH ON ALL OTHER DAYS 96 tablet 0    [DISCONTINUED] citalopram (CELEXA) 10 MG tablet TAKE 1 TABLET(10 MG) BY MOUTH EVERY DAY 30 tablet 0    [DISCONTINUED] empagliflozin (JARDIANCE) 10 mg Tab Take 1 tablet by mouth once daily. 90 tablet 0    [DISCONTINUED] FLUoxetine 10 MG capsule   8    [DISCONTINUED] meclizine (ANTIVERT) 50 MG tablet Take 1 tablet (50 mg total) by mouth 2 (two) times daily. 30 tablet 0    [DISCONTINUED] pravastatin (PRAVACHOL) 40 MG tablet Take 1 tablet (40 mg total) by mouth once daily. 90 tablet 3     No current facility-administered medications on file prior to visit.        Allergies   Review of patient's allergies indicates:   Allergen Reactions    Lovenox [enoxaparin] Other (See " "Comments)     Severe headache      Augmentin [amoxicillin-pot clavulanate] Other (See Comments)     Yeast infection    Effexor [venlafaxine] Other (See Comments)     Other reaction(s): bad mood changes  Bad mood  changes    Hydrochlorothiazide      Other reaction(s): pain in back    Lisinopril Swelling     Throat swells.     Pcn [penicillins] Other (See Comments)     Other reaction(s): Unknown    Sulfa (sulfonamide antibiotics)      Other reaction(s): RASH       Review of Systems   Constitutional: Negative for unexpected weight change.   HENT: Negative for ear pain and sore throat.    Eyes: Negative for visual disturbance.   Respiratory: Negative for shortness of breath.    Cardiovascular: Negative for chest pain.   Gastrointestinal: Negative for abdominal pain and blood in stool.   Endocrine: Negative for cold intolerance and heat intolerance.   Genitourinary: Negative for dysuria and frequency.   Musculoskeletal:        Pain in toes   Skin: Negative for rash.   Neurological: Negative for weakness, numbness and headaches.   Hematological: Negative for adenopathy.   Psychiatric/Behavioral: Negative for suicidal ideas.     BP (!) 142/74 (BP Location: Left arm, Patient Position: Sitting, BP Method: Medium (Automatic))   Pulse 72   Resp 18   Ht 5' 2" (1.575 m)   Wt 76.6 kg (168 lb 14 oz)   SpO2 98%   BMI 30.89 kg/m²     Physical Exam   Constitutional: She appears well-developed and well-nourished.   HENT:   Head: Normocephalic and atraumatic.   Right Ear: External ear normal.   Left Ear: External ear normal.   Nose: Nose normal.   Mouth/Throat: Oropharynx is clear and moist. No oropharyngeal exudate.   Eyes: Conjunctivae and EOM are normal. Pupils are equal, round, and reactive to light. Right eye exhibits no discharge. Left eye exhibits no discharge.   Neck: Normal range of motion. Neck supple. No tracheal deviation present.   Cardiovascular: Normal rate, regular rhythm, normal heart sounds and intact " distal pulses.   No murmur heard.  Pulmonary/Chest: Effort normal and breath sounds normal. She has no wheezes. She has no rales.   Abdominal: Soft. Bowel sounds are normal. She exhibits no mass. There is no tenderness.   Musculoskeletal:        Feet:    Lymphadenopathy:     She has no cervical adenopathy.   Psychiatric: She has a normal mood and affect.   Vitals reviewed.      Assessment/Plan:  Valarie was seen today for diabetes and follow-up.    Diagnoses and all orders for this visit:    Type 2 diabetes mellitus with hyperglycemia, with long-term current use of insulin  -     pravastatin (PRAVACHOL) 40 MG tablet; Take 1 tablet (40 mg total) by mouth once daily.  -     empagliflozin (JARDIANCE) 25 mg Tab; Take 1 tablet by mouth once daily.  -     Comprehensive metabolic panel; Future  -     Hemoglobin A1c; Future  A1c is coming down however still elevated.  Discussed with patient increasing her Jardiance to 25 mg to help further decrease the A1c.  Furthermore discussed with patient that she is taking Lantus every day.  Educated her on how Lantus works.  Recheck labs in 3 months.  Continue current dose of metformin and pravastatin.    Hypertension, benign  Patient does not want start another medication for her blood pressure.  She states that she wants to see if with the higher dose of the Jardiance if her blood pressure will improve.  Discussed with her that the medication is not a blood pressure medicine even though it can help lower her blood pressure.    Obesity, Class I, BMI 30-34.9  The patient's BMI has been recorded in the chart. The patient has been provided educational materials regarding the benefits of attaining and maintaining a normal weight. We will continue to address and follow this issue during follow up visits.    Pain in both feet  -     Ambulatory referral to Podiatry  Refer to Podiatry.    Dizziness  -     meclizine (ANTIVERT) 25 mg tablet; Take 1 tablet (25 mg total) by mouth 3 (three) times  daily as needed.  Meclizine refilled.    Need for vaccination  -     (In Office Administered) Pneumococcal Polysaccharide Vaccine (23 Valent) (SQ/IM)          This office note has been dictated.  This dictation has been generated using M-Modal Fluency Direct dictation; some phonetic errors may occur.        Dr. Almanza

## 2024-09-13 NOTE — ED PROVIDER NOTE - PHYSICAL EXAMINATION
PHYSICAL EXAM:   General: chronically ill appearing, tachypneic  HEENT: NC/AT, PERRLA, , airway patent  Cardiovascular: regular rate, heart sounds distant, no pericardial effusion on recent CT yesterday  Respiratory: tachypneic, diminshed L sided breath sounds, saturwell on RA  Abdominal: soft, nontender, nondistended, no rebound, guarding or rigidity  Back: no spinal tenderness or step offs no ulcers  Extremities: trace LE edema b/l.   Neuro: Alert and oriented x1 (self)  -Corrie Palma MD Attending Physician

## 2024-09-13 NOTE — ED ADULT TRIAGE NOTE - CHIEF COMPLAINT QUOTE
pt BIBA from [facility] nursing [place], as per EMS pt needs "plural effusion drained" as per chart from NH, pt had xray preformed at facility s/p tx for PNA. xray showered Large left plural effusion. pt a&ox1, hx of dementia.

## 2024-09-14 DIAGNOSIS — G93.41 METABOLIC ENCEPHALOPATHY: ICD-10-CM

## 2024-09-14 DIAGNOSIS — J90 PLEURAL EFFUSION, NOT ELSEWHERE CLASSIFIED: ICD-10-CM

## 2024-09-14 LAB
ALBUMIN SERPL ELPH-MCNC: 2.2 G/DL — LOW (ref 3.3–5.2)
ALP SERPL-CCNC: 111 U/L — SIGNIFICANT CHANGE UP (ref 40–120)
ALT FLD-CCNC: 11 U/L — SIGNIFICANT CHANGE UP
AMMONIA BLD-MCNC: 17 UMOL/L — SIGNIFICANT CHANGE UP (ref 11–55)
ANION GAP SERPL CALC-SCNC: 12 MMOL/L — SIGNIFICANT CHANGE UP (ref 5–17)
ANISOCYTOSIS BLD QL: SLIGHT — SIGNIFICANT CHANGE UP
APPEARANCE UR: CLEAR — SIGNIFICANT CHANGE UP
AST SERPL-CCNC: 34 U/L — SIGNIFICANT CHANGE UP
BACTERIA # UR AUTO: NEGATIVE /HPF — SIGNIFICANT CHANGE UP
BASOPHILS # BLD AUTO: 0.18 K/UL — SIGNIFICANT CHANGE UP (ref 0–0.2)
BASOPHILS NFR BLD AUTO: 1.3 % — SIGNIFICANT CHANGE UP (ref 0–2)
BILIRUB SERPL-MCNC: 1.3 MG/DL — SIGNIFICANT CHANGE UP (ref 0.4–2)
BILIRUB UR-MCNC: ABNORMAL
BLD GP AB SCN SERPL QL: SIGNIFICANT CHANGE UP
BUN SERPL-MCNC: 39.1 MG/DL — HIGH (ref 8–20)
BURR CELLS BLD QL SMEAR: PRESENT — SIGNIFICANT CHANGE UP
CALCIUM SERPL-MCNC: 8.2 MG/DL — LOW (ref 8.4–10.5)
CAST: 2 /LPF — SIGNIFICANT CHANGE UP (ref 0–4)
CHLORIDE SERPL-SCNC: 106 MMOL/L — SIGNIFICANT CHANGE UP (ref 96–108)
CO2 SERPL-SCNC: 23 MMOL/L — SIGNIFICANT CHANGE UP (ref 22–29)
COLOR SPEC: SIGNIFICANT CHANGE UP
CREAT SERPL-MCNC: 1.57 MG/DL — HIGH (ref 0.5–1.3)
DIFF PNL FLD: NEGATIVE — SIGNIFICANT CHANGE UP
EGFR: 42 ML/MIN/1.73M2 — LOW
ELLIPTOCYTES BLD QL SMEAR: SLIGHT — SIGNIFICANT CHANGE UP
EOSINOPHIL # BLD AUTO: 0.49 K/UL — SIGNIFICANT CHANGE UP (ref 0–0.5)
EOSINOPHIL NFR BLD AUTO: 3.5 % — SIGNIFICANT CHANGE UP (ref 0–6)
FOLATE SERPL-MCNC: >20 NG/ML — SIGNIFICANT CHANGE UP
GIANT PLATELETS BLD QL SMEAR: PRESENT — SIGNIFICANT CHANGE UP
GLUCOSE SERPL-MCNC: 87 MG/DL — SIGNIFICANT CHANGE UP (ref 70–99)
GLUCOSE UR QL: NEGATIVE MG/DL — SIGNIFICANT CHANGE UP
HCT VFR BLD CALC: 43.3 % — SIGNIFICANT CHANGE UP (ref 39–50)
HGB BLD-MCNC: 13.7 G/DL — SIGNIFICANT CHANGE UP (ref 13–17)
KETONES UR-MCNC: ABNORMAL MG/DL
LACTATE SERPL-SCNC: 3 MMOL/L — HIGH (ref 0.5–2)
LACTATE SERPL-SCNC: 4.6 MMOL/L — CRITICAL HIGH (ref 0.5–2)
LEUKOCYTE ESTERASE UR-ACNC: ABNORMAL
LYMPHOCYTES # BLD AUTO: 1.82 K/UL — SIGNIFICANT CHANGE UP (ref 1–3.3)
LYMPHOCYTES # BLD AUTO: 13 % — SIGNIFICANT CHANGE UP (ref 13–44)
MAGNESIUM SERPL-MCNC: 2 MG/DL — SIGNIFICANT CHANGE UP (ref 1.6–2.6)
MANUAL SMEAR VERIFICATION: SIGNIFICANT CHANGE UP
MCHC RBC-ENTMCNC: 30.3 PG — SIGNIFICANT CHANGE UP (ref 27–34)
MCHC RBC-ENTMCNC: 31.6 GM/DL — LOW (ref 32–36)
MCV RBC AUTO: 95.8 FL — SIGNIFICANT CHANGE UP (ref 80–100)
METAMYELOCYTES # FLD: 0.4 % — HIGH (ref 0–0)
MONOCYTES # BLD AUTO: 1.02 K/UL — HIGH (ref 0–0.9)
MONOCYTES NFR BLD AUTO: 7.3 % — SIGNIFICANT CHANGE UP (ref 2–14)
NEUTROPHILS # BLD AUTO: 10.14 K/UL — HIGH (ref 1.8–7.4)
NEUTROPHILS NFR BLD AUTO: 72.3 % — SIGNIFICANT CHANGE UP (ref 43–77)
NITRITE UR-MCNC: NEGATIVE — SIGNIFICANT CHANGE UP
OVALOCYTES BLD QL SMEAR: SLIGHT — SIGNIFICANT CHANGE UP
PH UR: 6 — SIGNIFICANT CHANGE UP (ref 5–8)
PHOSPHATE SERPL-MCNC: 3.6 MG/DL — SIGNIFICANT CHANGE UP (ref 2.4–4.7)
PLAT MORPH BLD: NORMAL — SIGNIFICANT CHANGE UP
PLATELET # BLD AUTO: 166 K/UL — SIGNIFICANT CHANGE UP (ref 150–400)
POIKILOCYTOSIS BLD QL AUTO: SIGNIFICANT CHANGE UP
POLYCHROMASIA BLD QL SMEAR: SIGNIFICANT CHANGE UP
POTASSIUM SERPL-MCNC: 4.5 MMOL/L — SIGNIFICANT CHANGE UP (ref 3.5–5.3)
POTASSIUM SERPL-SCNC: 4.5 MMOL/L — SIGNIFICANT CHANGE UP (ref 3.5–5.3)
PROT SERPL-MCNC: 6.1 G/DL — LOW (ref 6.6–8.7)
PROT UR-MCNC: 30 MG/DL
RBC # BLD: 4.52 M/UL — SIGNIFICANT CHANGE UP (ref 4.2–5.8)
RBC # FLD: 16.7 % — HIGH (ref 10.3–14.5)
RBC BLD AUTO: ABNORMAL
RBC CASTS # UR COMP ASSIST: 2 /HPF — SIGNIFICANT CHANGE UP (ref 0–4)
SODIUM SERPL-SCNC: 140 MMOL/L — SIGNIFICANT CHANGE UP (ref 135–145)
SP GR SPEC: 1.02 — SIGNIFICANT CHANGE UP (ref 1–1.03)
SQUAMOUS # UR AUTO: 0 /HPF — SIGNIFICANT CHANGE UP (ref 0–5)
TROPONIN T, HIGH SENSITIVITY RESULT: 70 NG/L — HIGH (ref 0–51)
TSH SERPL-MCNC: 1.51 UIU/ML — SIGNIFICANT CHANGE UP (ref 0.27–4.2)
UROBILINOGEN FLD QL: 2 MG/DL (ref 0.2–1)
VARIANT LYMPHS # BLD: 2.2 % — SIGNIFICANT CHANGE UP (ref 0–6)
VIT B12 SERPL-MCNC: 1699 PG/ML — HIGH (ref 232–1245)
WBC # BLD: 14.03 K/UL — HIGH (ref 3.8–10.5)
WBC # FLD AUTO: 14.03 K/UL — HIGH (ref 3.8–10.5)
WBC UR QL: 1 /HPF — SIGNIFICANT CHANGE UP (ref 0–5)

## 2024-09-14 PROCEDURE — 76775 US EXAM ABDO BACK WALL LIM: CPT | Mod: 26

## 2024-09-14 PROCEDURE — 71250 CT THORAX DX C-: CPT | Mod: 26

## 2024-09-14 PROCEDURE — 99232 SBSQ HOSP IP/OBS MODERATE 35: CPT | Mod: FS

## 2024-09-14 PROCEDURE — 93010 ELECTROCARDIOGRAM REPORT: CPT

## 2024-09-14 PROCEDURE — 99223 1ST HOSP IP/OBS HIGH 75: CPT

## 2024-09-14 RX ORDER — POLYETHYLENE GLYCOL 3350 17 G/17G
17 POWDER, FOR SOLUTION ORAL
Refills: 0 | DISCHARGE

## 2024-09-14 RX ORDER — SENNA 187 MG
2 TABLET ORAL AT BEDTIME
Refills: 0 | Status: DISCONTINUED | OUTPATIENT
Start: 2024-09-14 | End: 2024-09-19

## 2024-09-14 RX ORDER — CEFEPIME 2 G/1
2000 INJECTION, POWDER, FOR SOLUTION INTRAVENOUS EVERY 12 HOURS
Refills: 0 | Status: DISCONTINUED | OUTPATIENT
Start: 2024-09-14 | End: 2024-09-14

## 2024-09-14 RX ORDER — DOCUSATE CALCIUM 240 MG/1
2 CAPSULE ORAL
Refills: 0 | DISCHARGE

## 2024-09-14 RX ORDER — DILTIAZEM HYDROCHLORIDE 5 MG/ML
30 INJECTION INTRAVENOUS EVERY 6 HOURS
Refills: 0 | Status: DISCONTINUED | OUTPATIENT
Start: 2024-09-14 | End: 2024-09-19

## 2024-09-14 RX ORDER — POLYETHYLENE GLYCOL 3350 17 G/17G
17 POWDER, FOR SOLUTION ORAL
Refills: 0 | Status: DISCONTINUED | OUTPATIENT
Start: 2024-09-14 | End: 2024-09-19

## 2024-09-14 RX ORDER — ACETAMINOPHEN 325 MG/1
650 TABLET ORAL EVERY 6 HOURS
Refills: 0 | Status: DISCONTINUED | OUTPATIENT
Start: 2024-09-14 | End: 2024-09-19

## 2024-09-14 RX ORDER — ONDANSETRON 2 MG/ML
4 INJECTION, SOLUTION INTRAMUSCULAR; INTRAVENOUS EVERY 8 HOURS
Refills: 0 | Status: DISCONTINUED | OUTPATIENT
Start: 2024-09-14 | End: 2024-09-19

## 2024-09-14 RX ORDER — METOPROLOL TARTRATE 100 MG/1
1 TABLET ORAL
Refills: 0 | DISCHARGE

## 2024-09-14 RX ORDER — VENLAFAXINE HYDROCHLORIDE 150 MG/1
1 CAPSULE, EXTENDED RELEASE ORAL
Refills: 0 | DISCHARGE

## 2024-09-14 RX ORDER — MAGNESIUM, ALUMINUM HYDROXIDE 200-225/5
30 SUSPENSION, ORAL (FINAL DOSE FORM) ORAL EVERY 4 HOURS
Refills: 0 | Status: DISCONTINUED | OUTPATIENT
Start: 2024-09-14 | End: 2024-09-19

## 2024-09-14 RX ORDER — VENLAFAXINE HYDROCHLORIDE 150 MG/1
37.5 CAPSULE, EXTENDED RELEASE ORAL DAILY
Refills: 0 | Status: DISCONTINUED | OUTPATIENT
Start: 2024-09-14 | End: 2024-09-19

## 2024-09-14 RX ORDER — TAMSULOSIN HYDROCHLORIDE 0.4 MG/1
0.4 CAPSULE ORAL AT BEDTIME
Refills: 0 | Status: DISCONTINUED | OUTPATIENT
Start: 2024-09-14 | End: 2024-09-19

## 2024-09-14 RX ORDER — FUROSEMIDE 40 MG
40 TABLET ORAL ONCE
Refills: 0 | Status: COMPLETED | OUTPATIENT
Start: 2024-09-14 | End: 2024-09-14

## 2024-09-14 RX ORDER — SENNA 187 MG
1 TABLET ORAL
Refills: 0 | DISCHARGE

## 2024-09-14 RX ORDER — CEFEPIME 2 G/1
2000 INJECTION, POWDER, FOR SOLUTION INTRAVENOUS EVERY 12 HOURS
Refills: 0 | Status: DISCONTINUED | OUTPATIENT
Start: 2024-09-14 | End: 2024-09-17

## 2024-09-14 RX ORDER — FUROSEMIDE 40 MG
1 TABLET ORAL
Refills: 0 | DISCHARGE

## 2024-09-14 RX ORDER — METOPROLOL TARTRATE 100 MG/1
50 TABLET ORAL
Refills: 0 | Status: DISCONTINUED | OUTPATIENT
Start: 2024-09-14 | End: 2024-09-19

## 2024-09-14 RX ORDER — HEPARIN SODIUM,BOVINE 1000/ML
5000 VIAL (ML) INJECTION EVERY 8 HOURS
Refills: 0 | Status: DISCONTINUED | OUTPATIENT
Start: 2024-09-14 | End: 2024-09-16

## 2024-09-14 RX ADMIN — Medication 20 MILLIGRAM(S): at 21:34

## 2024-09-14 RX ADMIN — Medication 40 MILLIGRAM(S): at 12:11

## 2024-09-14 RX ADMIN — TAMSULOSIN HYDROCHLORIDE 0.4 MILLIGRAM(S): 0.4 CAPSULE ORAL at 21:34

## 2024-09-14 RX ADMIN — DILTIAZEM HYDROCHLORIDE 30 MILLIGRAM(S): 5 INJECTION INTRAVENOUS at 12:11

## 2024-09-14 RX ADMIN — VENLAFAXINE HYDROCHLORIDE 37.5 MILLIGRAM(S): 150 CAPSULE, EXTENDED RELEASE ORAL at 12:11

## 2024-09-14 RX ADMIN — METOPROLOL TARTRATE 50 MILLIGRAM(S): 100 TABLET ORAL at 17:24

## 2024-09-14 RX ADMIN — Medication 2 TABLET(S): at 21:34

## 2024-09-14 RX ADMIN — DILTIAZEM HYDROCHLORIDE 30 MILLIGRAM(S): 5 INJECTION INTRAVENOUS at 17:24

## 2024-09-14 RX ADMIN — Medication 5000 UNIT(S): at 12:19

## 2024-09-14 RX ADMIN — Medication 5000 UNIT(S): at 21:33

## 2024-09-14 RX ADMIN — CEFEPIME 2000 MILLIGRAM(S): 2 INJECTION, POWDER, FOR SOLUTION INTRAVENOUS at 17:24

## 2024-09-14 NOTE — PROGRESS NOTE ADULT - SUBJECTIVE AND OBJECTIVE BOX
Thoracic Surgery follow-up called by ER attending due to residual Left hemothorax noted on outpatient imaging    PAST MEDICAL & SURGICAL HISTORY:  HTN (hypertension)  Afib  Chronic constipation  Insomnia  Hyperlipidemia  Anxiety and depression  Mild cognitive impairment  No significant past surgical history    FAMILY HISTORY:  No pertinent family history in first degree relatives    Brief Hospital Course:   89 year old male with a PMHx of AFib (no longer on Eliquis due to falls), HTN, BPH, mild cognitive impairment, recently admitted to [facility] 8/16-8/21/24 s/p mechanical fall with transverse process spinal fractures and Left extrapleural hematoma with concern for active extravasation, ultimately with no active extravasation seen by IR during angiogram, and no intervention required from thoracic surgery, with patient discharged to rehab. Patient now sent to [facility] ER last night 9/13/24 for increased lethargy at [facility].  Patient with a recent history of trouble swallowing, placed on aspiration diet with thickened liquids, and placed on Levaquin for aspiration PNA.  Despite treatment, patient noted with increased lethargy at [facility], so he was sent to the ER.      Outpatient CT chest 9/12/24 showed a Left pleural effusion with high density component consistent with a hemothorax with deviation of trachea to the right.  Thoracic surgery team called to follow up on imaging.     Subjective: Patient lying flat on stretcher in ER. A/o x 1. Unknown baseline mental status. H/o dementia. No pain elicited. Unable to obtain full ROS due to mental status.     MEDICATIONS  (STANDING):  Reviewed in chart.     Allergies: No Known Allergies    Vitals   T(C): 36.1 (13 Sep 2024 23:22), Max: 37 (13 Sep 2024 20:29)  T(F): 97 (13 Sep 2024 23:22), Max: 98.6 (13 Sep 2024 20:29)  HR: 84 (13 Sep 2024 23:22) (84 - 91)  BP: 147/73 (13 Sep 2024 23:22) (115/72 - 147/73)  RR: 20 (13 Sep 2024 23:22) (18 - 20)  SpO2: 92% (13 Sep 2024 23:22) (92% - 95%)  O2 Parameters below as of 13 Sep 2024 17:13  Patient On (Oxygen Delivery Method): room air    Physical Exam  General: Lying flat on stretcher in ER in NAD, elderly, cachectic  Neuro: A+O x 1, non-focal, speech clear and intact  HEENT:  NCAT  Neck:  Supple, trachea midline  Pulm: +Diminished BSs at left base, no accessory muscle use noted  CV: RRR, +S1S2  Abd: soft, NT, ND, + BS  Ext: DOYLE x 4, trace edema, no cyanosis, distal motor/neuro/circ intact  Skin: warm, dry, perfused    LABS                        13.6   11.99 )-----------( 204      ( 13 Sep 2024 20:29 )             42.3     09-13    141  |  105  |  40.8<H>  ----------------------------<  109<H>  4.6   |  22.0  |  1.73<H>    Ca    8.6      13 Sep 2024 20:29    TPro  6.4<L>  /  Alb  2.5<L>  /  TBili  1.2  /  DBili  x   /  AST  39  /  ALT  12  /  AlkPhos  126<H>  09-13    PT/INR - ( 13 Sep 2024 23:00 )   PT: 14.0 sec;   INR: 1.27 ratio      PTT - ( 13 Sep 2024 23:00 )  PTT:33.5 sec    Urinalysis Basic - ( 13 Sep 2024 20:29 )  Color: x / Appearance: x / SG: x / pH: x  Gluc: 109 mg/dL / Ketone: x  / Bili: x / Urobili: x   Blood: x / Protein: x / Nitrite: x   Leuk Esterase: x / RBC: x / WBC x   Sq Epi: x / Non Sq Epi: x / Bacteria: x    Last CXR:  < from: Xray Chest 1 View- PORTABLE-Urgent (Xray Chest 1 View- PORTABLE-Urgent .) (09.13.24 @ 19:46) >  IMPRESSION:  Decreased moderate LEFT pleural effusion and lower zone airspace consolidation obscuring diaphragm contours.  < end of copied text >

## 2024-09-14 NOTE — CHART NOTE - NSCHARTNOTEFT_GEN_A_CORE
89 year old male with a PMHx of AFib (no longer on Eliquis due to falls), HTN, BPH, mild cognitive impairment, recently admitted to [facility] 8/16-8/21/24 s/p mechanical fall with transverse process spinal fractures and Left extrapleural hematoma with concern for active extravasation, ultimately with no active extravasation seen by IR during angiogram, and no intervention required from thoracic surgery, with patient discharged to rehab. Patient now sent to [facility] ER last night 9/13/24 for increased lethargy at [facility].  Patient with a recent history of trouble swallowing, placed on aspiration diet with thickened liquids, and placed on Levaquin for aspiration PNA.  Despite treatment, patient noted with increased lethargy at [facility], so he was sent to the ER.      Outpatient CT chest 9/12/24 showed a Left pleural effusion with high density component consistent with a hemothorax with deviation of trachea to the right.  Thoracic surgery team called to follow up on imaging.       ·  Problem: Pleural effusion, left.   ·  Plan: Patient brought to ER with lethargy, AMS.   Was being treated for aspiration PNA at [facility] with PO Levaquin and placed on an aspiration diet with thickened liquids.   Thoracic sx consulted for retained Left hemothorax on outpatient CT imaging.  SpO2 stable on room air.  H/H stable since discharge 1 month ago.   CT reviewed by Dr. Mcnamara - appears to  have intrapleural and extrapleural components.  Recommend IR for potential chest tube placement and drainage of intrapleural fluid collection.      Plan discussed / reviewed with Thoracic Surgery attending Dr. Mcnamara.

## 2024-09-14 NOTE — H&P ADULT - NSHPPHYSICALEXAM_GEN_ALL_CORE
GENERAL: pt examined bedside, laying comfortably in bed in NAD  HEENT: NC/AT, moist oral mucosa, clear conjunctiva, sclera nonicteric  RESPIRATORY: Normal respiratory effort; CTA b/l, no wheezing, rhonchi, rales  CARDIOVASCULAR: RRR, normal S1 and S2, no murmur/rub/gallop  ABDOMEN: soft, NT/ND, normoactive bowel sounds, no rebound/guarding  MSK: No joint deformities, edema, erythema  EXTREMITIES: No cynaosis, no clubbing, no lower extremity edema; Peripheral pulses are 2+ bilaterally  PSYCH: affect appropriate and cooperative  NEUROLOGY: A+O to person, place, and time, no focal neurologic deficits appreciated  SKIN: No rashes or no palpable lesions GENERAL: pt examined bedside, laying comfortably in bed in NAD  HEENT: NC/AT, moist oral mucosa, clear conjunctiva, sclera nonicteric  NECK: +JVD and hepatojugular reflex  RESPIRATORY: poor inspiratory effort, decreased BS on L, scattered course rales on Rt, no wheezing or rhonchi  CARDIOVASCULAR: RRR, normal S1 and S2  ABDOMEN: soft, NT/ND, +bowel sounds, no rebound/guarding  MSK: No joint deformities, edema, erythema  EXTREMITIES: No cynaosis, no clubbing, no lower extremity edema  PSYCH: affect flat but cooperative  NEUROLOGY: Drowsy but easily arousable, answers simple questions (oriented x 2), follows simple commands, moving all extremities, no focal neurologic deficits appreciated  SKIN: scattered ecchymosis on extremities

## 2024-09-14 NOTE — H&P ADULT - HISTORY OF PRESENT ILLNESS
89 y/o M w/ PMH AFib (no longer on Eliquis 2/2 falls), HTN, BPH, HFpEF (61% 08/16/24), mild cognitive impairment (AAO2-3 at baseline), recently admitted to [facility] trauma service (8/16/24 - 8/21/24) s/p mechanical fall found to have transverse process spinal fractures, multiple rib fractures and Left extrapleural hematoma w/ concern for active extravasation.   At that time pt was on elquis, it was reversed pt received 1u PRBC but ultimately pt had no active extravasation seen by IR during angiogram and no intervention required from thoracic surgery.  Hospital course was also complicated by [facility] due to urinary retention and AF RVR.   AC w/s d/c'd during that admission due to frequent falls and pt ultimately d/c'd to [facility] 08/21/24 presents back to [facility] ED from [facility] for lethargy.    While at [facility] pt was reported to dysphagia and diet was adjusted to thickened liquids and was started on levaquin (completed 09/10) for aspiration PNA.  Pt has become increasingly lethargic past few days and was attributed to worsening aspiration therefore was sent for outpt CT chest 9/12/24 to assess and was significant for Left pleural effusion with high density component consistent with a hemothorax with deviation of trachea to the right and was subsequently sent in to hospital.  At this time pt is a poor historian however  88 y/o M w/ PMH AFib (no longer on Eliquis 2/2 falls), HTN, BPH, HFpEF (61% 08/16/24), mild cognitive impairment (AAO2-3 at baseline), recently admitted to [facility] trauma service (8/16/24 - 8/21/24) s/p mechanical fall found to have transverse process spinal fractures, multiple rib fractures and Left extrapleural hematoma w/ concern for active extravasation.   At that time pt was on elquis, it was reversed pt received 1u PRBC but ultimately pt had no active extravasation seen by IR during angiogram and no intervention required from thoracic surgery.  Hospital course was also complicated by [facility] due to urinary retention and AF RVR.   AC w/s d/c'd during that admission due to frequent falls and pt ultimately d/c'd to [facility] 08/21/24 presents back to [facility] ED from [facility] for lethargy.    While at [facility] pt was reported to dysphagia and diet was adjusted to thickened liquids and was started on levaquin (completed 09/10) for aspiration PNA.  Pt has become increasingly lethargic past few days and was attributed to worsening aspiration therefore was sent for outpt CT chest 9/12/24 to assess and was significant for Left pleural effusion with high density component consistent with a hemothorax with deviation of trachea to the right and was subsequently sent in to hospital.  At this time pt is a poor historian however does report sob but denies cp, palpitations, abd pain, N/V, HA and is unsure if he has been retaining urine or had dysuria.

## 2024-09-14 NOTE — ED ADULT NURSE REASSESSMENT NOTE - NS ED NURSE REASSESS COMMENT FT1
Report given to 6 twr RN Sav, patient is resting comfortably, awakens to verbal stimuli, rr even and unlabored, denies pain, chest pain, sob, iv patent, able to make needs known, safety maintained.

## 2024-09-14 NOTE — PATIENT PROFILE ADULT - FALL HARM RISK - HARM RISK INTERVENTIONS

## 2024-09-14 NOTE — PROGRESS NOTE ADULT - ASSESSMENT
89 year old male with a PMHx of AFib (no longer on Eliquis due to falls), HTN, BPH, mild cognitive impairment, recently admitted to [facility] 8/16-8/21/24 s/p mechanical fall with transverse process spinal fractures and Left extrapleural hematoma with concern for active extravasation, ultimately with no active extravasation seen by IR during angiogram, and no intervention required from thoracic surgery, with patient discharged to rehab. Patient now sent to [facility] ER last night 9/13/24 for increased lethargy at [facility].  Patient with a recent history of trouble swallowing, placed on aspiration diet with thickened liquids, and placed on Levaquin for aspiration PNA.  Despite treatment, patient noted with increased lethargy at [facility], so he was sent to the ER.      Outpatient CT chest 9/12/24 showed a Left pleural effusion with high density component consistent with a hemothorax with deviation of trachea to the right.  Thoracic surgery team called to follow up on imaging.

## 2024-09-14 NOTE — H&P ADULT - ASSESSMENT
88 y/o M w/ PMH AFib (no longer on Eliquis 2/2 falls), HTN, BPH, HFpEF (61% 08/16/24), mild cognitive impairment (AAO2-3 at baseline), recently admitted to [facility] trauma service (8/16/24 - 8/21/24) s/p mechanical fall found to have transverse process spinal fractures, multiple rib fractures and Left extrapleural hematoma w/ concern for active extravasation but was r/o w/ IR during angiogram and eliquis was d/c'd.  Hospital course was also complicated by LENARD due to urinary retention and AF RVR, medications were adjusted and pt was d/[facility]'d to [facility] on 08[facility]24 but presents back to [facility] ED for lethargy.   Of note while [facility] pt was reported to dysphagia, diet was adjusted to thickened liquids, was started on levaquin (completed 09/10) for aspiration PNA and sent for outpt CT chest 9/12/24 which was significant for large left pleural effusion with high density component consistent with a hemothorax with deviation of trachea to the right and was subsequently sent in to hospital.  VS relatively stable other than RR 20 and maintaining O2 sat>95% ORA.  Clinically toxic appearing w/ +JVD and hepatojugular reflex.  Initial w/u significant for leukocytosis but has no L-shift, LENARD w/ Cr 1.73, Trop 71-->70.  EKG w/ no acute ischemic changes, CXR report pending but L-pleural effusion noted, possible small rt effusion and pulm vascular congestion appreciated.  CTSx consulted by ED but no plan for thoracentesis at this time.  Admit for acute metabolic encephalopathy, CHF exacerbation, SIRS, type II MI and LENARD.          89 y/o M w/ PMH AFib (no longer on Eliquis 2/2 falls), HTN, BPH, HFpEF (61% 08/16/24), mild cognitive impairment (AAO2-3 at baseline), recently admitted to [facility] trauma service (8/16/24 - 8/21/24) s/p mechanical fall found to have transverse process spinal fractures, multiple rib fractures and Left extrapleural hematoma w/ concern for active extravasation but was r/o w/ IR during angiogram and eliquis was d/c'd.  Hospital course was also complicated by LENARD due to urinary retention and AF RVR, medications were adjusted and pt was d/c'd to [facility] on 08/21/24 but presents back to [facility] ED for lethargy.   Of note while at [facility] pt was reported to dysphagia, diet was adjusted to thickened liquids, was started on levaquin (completed 09/10) for aspiration PNA and sent for outpt CT chest 9/12/24 which was significant for large left pleural effusion with high density component consistent with a hemothorax with deviation of trachea to the right and was subsequently sent in to hospital.  VS relatively stable other than RR 20 and maintaining O2 sat>95% ORA.  Clinically toxic appearing w/ +JVD and hepatojugular reflex.  Initial w/u significant for leukocytosis but has no L-shift, LENARD w/ Cr 1.73, Trop 71-->70.  EKG w/ no acute ischemic changes, CXR report pending but L-pleural effusion noted, possible small rt effusion and pulm vascular congestion appreciated.  CTSx consulted by ED but no plan for thoracentesis at this time.  Admit for acute metabolic encephalopathy, CHF exacerbation, SIRS, type II MI and LENARD.         Acute metabolic encephalopathy likely multifactorial 2/2 uremia, SIRS, aspiration, pleural effusion, urinary retention   - Pt oriented x 2 but drowsy and toxic appearing   - CTH negative for acute pathology   - CXR report pending but L-pleural effusion noted, possible small rt effusion and pulm vascular congestion noted  - Will order non con CT chest to better reassess effusion  - Not requiring supplemental O2 as pt is maintaining O2 sat well ORA  - Completed recent treatment for aspiration PNA on 09/10 w/ levaquin   - NPO for now pending SLP eval   - s/p jarrell placement in ED for retention   - Started on emperic abx for SIRS while   - Will check TSH, B12, folate, RPR and ammonia to complete AMS w/u   - Despite outpt CT report of hemothorax pts h/h stable and hemodynamically stable  - CTSx consulted but no plan for emergent thoracentesis at this time   - Supplemental O2 if needed   - Aspiration and fall precautions       Acute decompensated HFpEF  - Positive JVD and evidence of hepatojugular reflex on exam   - CXR w/ evidence of pulm vascular congestion and large L pleural effusion and possible small R pleural effusion; official report pending    - Although typically see b/l pleural effusions w/ CHF, physical exam findings and pulm vascular congestion on imaging supports CHF  - BNP 13K and trops 71-->70  - Will give 1x dose of IV lasix 40mg qd for now as pt is npo and reassess need for IV lasix tomorrow otherwise can resume po lasix   - Anticipate degree of azotemia given IV diuresis   - Had TTE on 08/16/24 w/ LVEF 61%; will hold off on repeat TTE at this time   - Monitor I/O's and fluid restrict   - Maintain K>4 and Mg>2  - Monitor on telemetry       SIRS w/ leukocytosis and tachypnea w/ suspicion for sepsis   - Completed full course of levaquin on 09/10 for aspiration pna  - Suspect ongoing aspiration however also has large L-effusion   - Repeat CBC w/ uptrending WBC although still no L-shift   - Clinically toxic appearing  - UA negative for pyuria  - Will order blood and sputum cultures and MRSA pcr  - Start on empiric cefipime and if MRSA pcr positive start vanco   - If infectious w/u negative would d/c abxs  - Hold off on IVFs at this time in light of concern for vol overload   - If thoracentesis done will send for culture and assess fluid analysis   - Had CT chest outpt that reported large L-pleural effusions and possibly hemothorax   - Current CXR report pending but L-pleural effusion noted, possible small rt effusion and pulm vascular congestion appreciated  - CT chest non con ordered to reassesses effusion  - Monitor CBC and temperature         Type II MI, likely demand 2/2 above vs poor renal clearance  - Trop 71-->70  - EKG w/ no acute ischemic changes  - No chest pain reported   - BNP >13K suspect multifactorial from acute CHF, LENARD and possible sepsis   - Will order A1c and lipid panel   - Had TTE on 08/16/24 w/ LVEF 61%; will hold off on repeat TTE at this time   - Given trops down trended will hold off on cardio consult at this time   - Monitor on telemetry       AGMA likely 2/2 starvation ketosis and uremia  - Corrected AG 18, serum HCO3 normal on initial w/u   - UA w/ +ketones but no evidence of UTI   - Uremia likely from LENARD in the setting of urinary retention   - Stat CMP ordered and repeat corrected AG trending down   - Will check lactate level   - Pt npo for now pending SLP evaluation given concern for ongoing aspiration   - Aspiration precautions       LENADR likely 2/2 urinary retention   - Baseline Cr around 0.92 but was 1.73 on initial w/u   - Repeat CMP ordered and although Cr still elevated its improving s/p jarrell placed for retention in ED 09/14   - Had reported urinary retention on prior admission and has hx of BPH  - Will place on flomax   - UA w/ +protein, trace ketones, trace leuks but no WBC to suspect UTI  - UA also w/out blood therefore stone unlikely   - Will order renal US to assess for hydronephrosis  - Monitor renal function and I/o's  - Avoid nephrotoxic meds or renally dose if needed      Mild transaminitis w/ cholestatic predominancy   - Suspect congestive hepatopathy given +JVD and hepatojugular reflex   - Benign abd exam   - AST/ALT and Tbili normal   - Anticipate will improve w/ diuresis   - Trend LFTs periodically       AF  - Currently rate controlled  - CHADSVASC = 4  - c/w BB and dilt   - No longer on AC due to frequent falls   - Monitor on telemetry       HTN / HLD  - Hold ramipril 10mg qd for now given LENARD and will get 1x dose of lasix   - If renal function remains stable tomorrow would consider resuming ACEI  - c/w diltiazem and metoprolol   - c/w theraputic interchange of pravastatin ordered      Depression/Anxiety  - c/w venlafaxine           VTE ppx: heparin sq    Dispo: Acute.  Anticipate d/c back to [facility] pending clinical course.

## 2024-09-14 NOTE — H&P ADULT - NSHPLABSRESULTS_GEN_ALL_CORE
13.6   11.99 )-----------( 204      ( 13 Sep 2024 20:29 )             42.3       09-13    141  |  105  |  40.8<H>  ----------------------------<  109<H>  4.6   |  22.0  |  1.73<H>    Ca    8.6      13 Sep 2024 20:29    TPro  6.4<L>  /  Alb  2.5<L>  /  TBili  1.2  /  DBili  x   /  AST  39  /  ALT  12  /  AlkPhos  126<H>  09-13        < from: CT Head No Cont (09.13.24 @ 22:41) >    IMPRESSION:    No acute intracranial hemorrhage, mass effect, or acute displaced   calvarial fracture.    < end of copied text >      < from: TTE W or WO Ultrasound Enhancing Agent (08.16.24 @ 13:27) >      CONCLUSIONS:      1. Left ventricular cavity is small. Left ventricular systolic function is normal with an ejection fraction of 61 % by Devi's method of disks.   2. Analysis of left ventricular diastolic function and filling pressure is made challenging by the presence of atrial fibrillation.   3. Normal right ventricular cavity size and mildly reduced right ventricular systolic function.   4. Left atrium is moderately dilated.   5. Mild mitral regurgitation.   6. Trileaflet aortic valve with normal systolic excursion. There is focal calcification of the aortic valve leaflets.   7. Moderate aortic regurgitation.   8. Mild to moderate tricuspid regurgitation.   9. Estimated pulmonary artery systolic pressure is 33 mmHg.  10. No pericardial effusion seen.  11. Large left pleural effusion noted.    < end of copied text >

## 2024-09-15 LAB
A1C WITH ESTIMATED AVERAGE GLUCOSE RESULT: 5.7 % — HIGH (ref 4–5.6)
ALBUMIN SERPL ELPH-MCNC: 2.3 G/DL — LOW (ref 3.3–5.2)
ALP SERPL-CCNC: 105 U/L — SIGNIFICANT CHANGE UP (ref 40–120)
ALT FLD-CCNC: 10 U/L — SIGNIFICANT CHANGE UP
ANION GAP SERPL CALC-SCNC: 13 MMOL/L — SIGNIFICANT CHANGE UP (ref 5–17)
AST SERPL-CCNC: 32 U/L — SIGNIFICANT CHANGE UP
BASOPHILS # BLD AUTO: 0.06 K/UL — SIGNIFICANT CHANGE UP (ref 0–0.2)
BASOPHILS NFR BLD AUTO: 0.5 % — SIGNIFICANT CHANGE UP (ref 0–2)
BILIRUB SERPL-MCNC: 1.3 MG/DL — SIGNIFICANT CHANGE UP (ref 0.4–2)
BUN SERPL-MCNC: 43.8 MG/DL — HIGH (ref 8–20)
CALCIUM SERPL-MCNC: 9 MG/DL — SIGNIFICANT CHANGE UP (ref 8.4–10.5)
CHLORIDE SERPL-SCNC: 108 MMOL/L — SIGNIFICANT CHANGE UP (ref 96–108)
CHOLEST SERPL-MCNC: 81 MG/DL — SIGNIFICANT CHANGE UP
CO2 SERPL-SCNC: 22 MMOL/L — SIGNIFICANT CHANGE UP (ref 22–29)
CREAT SERPL-MCNC: 1.47 MG/DL — HIGH (ref 0.5–1.3)
CULTURE RESULTS: NO GROWTH — SIGNIFICANT CHANGE UP
EGFR: 45 ML/MIN/1.73M2 — LOW
EOSINOPHIL # BLD AUTO: 0.28 K/UL — SIGNIFICANT CHANGE UP (ref 0–0.5)
EOSINOPHIL NFR BLD AUTO: 2.5 % — SIGNIFICANT CHANGE UP (ref 0–6)
ESTIMATED AVERAGE GLUCOSE: 117 MG/DL — HIGH (ref 68–114)
GLUCOSE SERPL-MCNC: 104 MG/DL — HIGH (ref 70–99)
HCT VFR BLD CALC: 43.9 % — SIGNIFICANT CHANGE UP (ref 39–50)
HDLC SERPL-MCNC: 26 MG/DL — LOW
HGB BLD-MCNC: 13.7 G/DL — SIGNIFICANT CHANGE UP (ref 13–17)
IMM GRANULOCYTES NFR BLD AUTO: 0.8 % — SIGNIFICANT CHANGE UP (ref 0–0.9)
LACTATE SERPL-SCNC: 1.9 MMOL/L — SIGNIFICANT CHANGE UP (ref 0.5–2)
LIPID PNL WITH DIRECT LDL SERPL: 35 MG/DL — SIGNIFICANT CHANGE UP
LYMPHOCYTES # BLD AUTO: 1.78 K/UL — SIGNIFICANT CHANGE UP (ref 1–3.3)
LYMPHOCYTES # BLD AUTO: 16.1 % — SIGNIFICANT CHANGE UP (ref 13–44)
MAGNESIUM SERPL-MCNC: 2 MG/DL — SIGNIFICANT CHANGE UP (ref 1.6–2.6)
MCHC RBC-ENTMCNC: 30 PG — SIGNIFICANT CHANGE UP (ref 27–34)
MCHC RBC-ENTMCNC: 31.2 GM/DL — LOW (ref 32–36)
MCV RBC AUTO: 96.3 FL — SIGNIFICANT CHANGE UP (ref 80–100)
MONOCYTES # BLD AUTO: 1.1 K/UL — HIGH (ref 0–0.9)
MONOCYTES NFR BLD AUTO: 10 % — SIGNIFICANT CHANGE UP (ref 2–14)
MRSA PCR RESULT.: SIGNIFICANT CHANGE UP
NEUTROPHILS # BLD AUTO: 7.73 K/UL — HIGH (ref 1.8–7.4)
NEUTROPHILS NFR BLD AUTO: 70.1 % — SIGNIFICANT CHANGE UP (ref 43–77)
NON HDL CHOLESTEROL: 55 MG/DL — SIGNIFICANT CHANGE UP
PHOSPHATE SERPL-MCNC: 3.4 MG/DL — SIGNIFICANT CHANGE UP (ref 2.4–4.7)
PLATELET # BLD AUTO: 187 K/UL — SIGNIFICANT CHANGE UP (ref 150–400)
POTASSIUM SERPL-MCNC: 4.6 MMOL/L — SIGNIFICANT CHANGE UP (ref 3.5–5.3)
POTASSIUM SERPL-SCNC: 4.6 MMOL/L — SIGNIFICANT CHANGE UP (ref 3.5–5.3)
PROT SERPL-MCNC: 6.1 G/DL — LOW (ref 6.6–8.7)
RBC # BLD: 4.56 M/UL — SIGNIFICANT CHANGE UP (ref 4.2–5.8)
RBC # FLD: 16.6 % — HIGH (ref 10.3–14.5)
S AUREUS DNA NOSE QL NAA+PROBE: SIGNIFICANT CHANGE UP
SODIUM SERPL-SCNC: 142 MMOL/L — SIGNIFICANT CHANGE UP (ref 135–145)
SPECIMEN SOURCE: SIGNIFICANT CHANGE UP
TRIGL SERPL-MCNC: 98 MG/DL — SIGNIFICANT CHANGE UP
WBC # BLD: 11.04 K/UL — HIGH (ref 3.8–10.5)
WBC # FLD AUTO: 11.04 K/UL — HIGH (ref 3.8–10.5)

## 2024-09-15 PROCEDURE — 99233 SBSQ HOSP IP/OBS HIGH 50: CPT

## 2024-09-15 PROCEDURE — 99232 SBSQ HOSP IP/OBS MODERATE 35: CPT

## 2024-09-15 RX ADMIN — Medication 5000 UNIT(S): at 21:24

## 2024-09-15 RX ADMIN — DILTIAZEM HYDROCHLORIDE 30 MILLIGRAM(S): 5 INJECTION INTRAVENOUS at 11:50

## 2024-09-15 RX ADMIN — CEFEPIME 2000 MILLIGRAM(S): 2 INJECTION, POWDER, FOR SOLUTION INTRAVENOUS at 05:17

## 2024-09-15 RX ADMIN — Medication 20 MILLIGRAM(S): at 21:24

## 2024-09-15 RX ADMIN — TAMSULOSIN HYDROCHLORIDE 0.4 MILLIGRAM(S): 0.4 CAPSULE ORAL at 21:24

## 2024-09-15 RX ADMIN — METOPROLOL TARTRATE 50 MILLIGRAM(S): 100 TABLET ORAL at 17:33

## 2024-09-15 RX ADMIN — METOPROLOL TARTRATE 50 MILLIGRAM(S): 100 TABLET ORAL at 05:17

## 2024-09-15 RX ADMIN — Medication 5000 UNIT(S): at 05:16

## 2024-09-15 RX ADMIN — VENLAFAXINE HYDROCHLORIDE 37.5 MILLIGRAM(S): 150 CAPSULE, EXTENDED RELEASE ORAL at 11:50

## 2024-09-15 RX ADMIN — DILTIAZEM HYDROCHLORIDE 30 MILLIGRAM(S): 5 INJECTION INTRAVENOUS at 05:17

## 2024-09-15 RX ADMIN — Medication 2 TABLET(S): at 21:24

## 2024-09-15 RX ADMIN — CEFEPIME 2000 MILLIGRAM(S): 2 INJECTION, POWDER, FOR SOLUTION INTRAVENOUS at 17:33

## 2024-09-15 RX ADMIN — DILTIAZEM HYDROCHLORIDE 30 MILLIGRAM(S): 5 INJECTION INTRAVENOUS at 17:34

## 2024-09-15 RX ADMIN — Medication 5000 UNIT(S): at 13:00

## 2024-09-15 RX ADMIN — DILTIAZEM HYDROCHLORIDE 30 MILLIGRAM(S): 5 INJECTION INTRAVENOUS at 00:21

## 2024-09-15 NOTE — SWALLOW BEDSIDE ASSESSMENT ADULT - MODE OF PRESENTATION
cup/self fed/fed by clinician spoon/fed by clinician fed by clinician cup/spoon/self fed/fed by clinician

## 2024-09-15 NOTE — PROGRESS NOTE ADULT - CONVERSATION DETAILS
Patient's daughter at bedside and previous MOLST discussed - daughter confirms patient is a DNR but states he is also a DNI. However, she did agree to a trial of NIPPV if needed. Patient AAO x 3 today and confirms that he would not want any heroic measures. New MOLST completed and placed in chart. All questions answered and emotional support provided.

## 2024-09-15 NOTE — PROGRESS NOTE ADULT - ASSESSMENT
88 y/o M w/ PMH AFib (no longer on Eliquis 2/2 falls), HTN, BPH, HFpEF (61% 08/16/24), mild cognitive impairment (AAO2-3 at baseline), recently admitted to [facility] trauma service (8/16/24 - 8/21/24) s/p mechanical fall found to have transverse process spinal fractures, multiple rib fractures and Left extrapleural hematoma w/ concern for active extravasation but was r/o w/ IR during angiogram and eliquis was d/c'd.  Hospital course was also complicated by LENARD due to urinary retention and AF RVR, medications were adjusted and pt was d/c'd to [facility] on 08/[facility]/24 but presents back to [facility] ED for lethargy.   Of note while at [facility] pt was reported to dysphagia, diet was adjusted to thickened liquids, was started on levaquin (completed 09/10) for aspiration PNA and sent for outpt CT chest 9/12/24 which was significant for large left pleural effusion with high density component consistent with a hemothorax with deviation of trachea to the right and was subsequently sent in to hospital.  VS relatively stable other than RR 20 and maintaining O2 sat>95% ORA.  Clinically toxic appearing w/ +JVD and hepatojugular reflex.  Initial w/u significant for leukocytosis but has no L-shift, LENARD w/ Cr 1.73, Trop 71-->70.  EKG w/ no acute ischemic changes, CXR report pending but L-pleural effusion noted, possible small rt effusion and pulm vascular congestion appreciated.  CTSx consulted by ED but no plan for thoracentesis at this time.  Admit for acute metabolic encephalopathy, CHF exacerbation, SIRS, type II MI and LENARD.     Acute metabolic encephalopathy likely multifactorial 2/2 uremia, SIRS, aspiration, pleural effusion, urinary retention   - Pt oriented x 2 but drowsy and toxic appearing   - CTH negative for acute pathology   - CXR report pending but L-pleural effusion noted, possible small rt effusion and pulm vascular congestion noted  - Will order non con CT chest to better reassess effusion  - Not requiring supplemental O2 as pt is maintaining O2 sat well ORA  - Completed recent treatment for aspiration PNA on 09/10 w/ levaquin   - NPO for now pending SLP eval   - s/p jarrell placement in ED for retention   - Started on emperic abx for SIRS while   - Will check TSH, B12, folate, RPR and ammonia to complete AMS w/u   - Despite outpt CT report of hemothorax pts h/h stable and hemodynamically stable  - CTSx consulted but no plan for emergent thoracentesis at this time   - Supplemental O2 if needed   - Aspiration and fall precautions     Acute decompensated HFpEF  - Positive JVD and evidence of hepatojugular reflex on exam   - CXR w/ evidence of pulm vascular congestion and large L pleural effusion and possible small R pleural effusion; official report pending    - Although typically see b/l pleural effusions w/ CHF, physical exam findings and pulm vascular congestion on imaging supports CHF  - BNP 13K and trops 71-->70  - Will give 1x dose of IV lasix 40mg qd for now as pt is npo and reassess need for IV lasix tomorrow otherwise can resume po lasix   - Anticipate degree of azotemia given IV diuresis   - Had TTE on 08/16/24 w/ LVEF 61%; will hold off on repeat TTE at this time   - Monitor I/O's and fluid restrict   - Maintain K>4 and Mg>2  - Monitor on telemetry     SIRS w/ leukocytosis and tachypnea w/ suspicion for sepsis   - Completed full course of levaquin on 09/10 for aspiration pna  - Suspect ongoing aspiration however also has large L-effusion   - Repeat CBC w/ uptrending WBC although still no L-shift   - Clinically toxic appearing  - UA negative for pyuria  - Will order blood and sputum cultures and MRSA pcr  - Start on empiric cefipime and if MRSA pcr positive start vanco   - If infectious w/u negative would d/c abxs  - Hold off on IVFs at this time in light of concern for vol overload   - If thoracentesis done will send for culture and assess fluid analysis   - Had CT chest outpt that reported large L-pleural effusions and possibly hemothorax   - Current CXR report pending but L-pleural effusion noted, possible small rt effusion and pulm vascular congestion appreciated  - CT chest non con ordered to reassesses effusion  - Monitor CBC and temperature       Type II MI, likely demand 2/2 above vs poor renal clearance  - Trop 71-->70  - EKG w/ no acute ischemic changes  - No chest pain reported   - BNP >13K suspect multifactorial from acute CHF, LENARD and possible sepsis   - Will order A1c and lipid panel   - Had TTE on 08/16/24 w/ LVEF 61%; will hold off on repeat TTE at this time   - Given trops down trended will hold off on cardio consult at this time   - Monitor on telemetry     AGMA likely 2/2 starvation ketosis and uremia  - Corrected AG 18, serum HCO3 normal on initial w/u   - UA w/ +ketones but no evidence of UTI   - Uremia likely from LENARD in the setting of urinary retention   - Stat CMP ordered and repeat corrected AG trending down   - Will check lactate level   - Pt npo for now pending SLP evaluation given concern for ongoing aspiration   - Aspiration precautions     LEANRD likely 2/2 urinary retention   - Baseline Cr around 0.92 but was 1.73 on initial w/u   - Repeat CMP ordered and although Cr still elevated its improving s/p jarrell placed for retention in ED 09/14   - Had reported urinary retention on prior admission and has hx of BPH  - Will place on flomax   - UA w/ +protein, trace ketones, trace leuks but no WBC to suspect UTI  - UA also w/out blood therefore stone unlikely   - Will order renal US to assess for hydronephrosis  - Monitor renal function and I/o's  - Avoid nephrotoxic meds or renally dose if needed    Mild transaminitis w/ cholestatic predominancy   - Suspect congestive hepatopathy given +JVD and hepatojugular reflex   - Benign abd exam   - AST/ALT and Tbili normal   - Anticipate will improve w/ diuresis   - Trend LFTs periodically     AF  - Currently rate controlled  - CHADSVASC = 4  - c/w BB and dilt   - No longer on AC due to frequent falls   - Monitor on telemetry     HTN / HLD  - Hold ramipril 10mg qd for now given LENARD and will get 1x dose of lasix   - If renal function remains stable tomorrow would consider resuming ACEI  - c/w diltiazem and metoprolol   - c/w theraputic interchange of pravastatin ordered    Depression/Anxiety  - continune venlafaxine     VTE ppx: Heparin SC    Dispo: Remains acute; f/u blood cultures. IR consult to assist with left chest tube. PT evaluation.    Plan discussed with patient, daughter at bedside at length, RN 88 y/o M w/ PMH AFib (no longer on Eliquis 2/2 falls), HTN, BPH, HFpEF (61% 08/16/24), mild cognitive impairment (AAO2-3 at baseline), recently admitted to [facility] trauma service (8/16/24 - 8/21/24) s/p mechanical fall found to have transverse process spinal fractures, multiple rib fractures and Left extrapleural hematoma w/ concern for active extravasation but was r/o w/ IR during angiogram and eliquis was d/c'd.  Hospital course was also complicated by LENARD due to urinary retention and AF RVR, medications were adjusted and pt was [facility]'d [facility] on [facility]24 but presents back to [facility] ED for lethargy.   Of note while at [facility] pt was reported to dysphagia, diet was adjusted to thickened liquids, was started on levaquin (completed 09/10) for aspiration PNA and sent for outpt CT chest 9/12/24 which was significant for large left pleural effusion with high density component consistent with a hemothorax with deviation of trachea to the right and was subsequently sent in to hospital.  VS relatively stable other than RR 20 and maintaining O2 sat>95% ORA.  Clinically toxic appearing w/ +JVD and hepatojugular reflex.  Initial w/u significant for leukocytosis but has no L-shift, LENARD w/ Cr 1.73, Trop 71-->70.  EKG w/ no acute ischemic changes, CXR report pending but L-pleural effusion noted, possible small rt effusion and pulm vascular congestion appreciated.  CTSx consulted by ED but no plan for thoracentesis at this time.  Admit for acute metabolic encephalopathy, CHF exacerbation, SIRS, type II MI and LENARD.     Acute metabolic encephalopathy likely multifactorial 2/2 uremia, SIRS, aspiration, pleural effusion, urinary retention - improved  - at baseline patient with mild cognitive impairment who resides in the memory unit at his facility  - currently AAO x 3 and following all commands  - CTH negative for acute pathology   - CXR report pending but L-pleural effusion noted, possible small rt effusion and pulm vascular congestion noted  - Completed recent treatment for aspiration PNA on 09/10 w/ levaquin; f/u blood cultures and continue Cefepime for now  - continue dysphagia diet per SLO  - s/p jarrell placement in ED for retention   - Will check TSH, B12, folate, and ammonia within normal range  - f/u RPR    - UA negative for UTI    Pleural Effusions  - patient with known left hemothorax   - currently remains stable on room air  - CT chest with worsening bilateral effusions  - CTSx consulted but no plan for emergent thoracentesis at this time and recommended IR eval due to left extra and intrapleural effusions  - Despite outpt CT report of hemothorax pts h/h stable and hemodynamically stable  - recently completed Levaquin for aspirin PNA  - f/u blood cultures  - Aspiration and fall precautions   - thoracic recs appreciated    Acute on Chronic HFpEF  - appears euvolemic today s/p IV lasix  - resume PO lasix in 24-48 hours   - CXR and CT chest reviewed but patient is stable on room air  - BNP 13K and trops 71-->70  - Had TTE on 08/16/24 w/ LVEF 61%; will hold off on repeat TTE at this time   - Monitor I/O's and fluid restrict   - Maintain K>4 and Mg>2  - Monitor on telemetry     SIRS w/ leukocytosis and tachypnea w/ suspicion for sepsis   - Completed full course of levaquin on 09/10 for aspiration pna  - Suspect ongoing aspiration however also has large L-effusion   - Leukocytosis improving and clinically non toxic appearsing  - UA negative for pyuria  - f/u blood cultures   - d/c cefepime if cultures are negative  - If thoracentesis done will send for culture and assess fluid analysis   - Monitor CBC and temperature       Type II MI, likely demand 2/2 above vs poor renal clearance  - Trop 71-->70  - EKG w/ no acute ischemic changes  - No chest pain reported   - BNP >13K suspect multifactorial from acute CHF, LENARD and possible sepsis   - Had TTE on 08/16/24 w/ LVEF 61%; will hold off on repeat TTE at this time   - Monitor on telemetry     AGMA likely 2/2 starvation ketosis and uremia  - UA w/ +ketones but no evidence of UTI   - Uremia likely from LENARD in the setting of urinary retention   - lactate normalized  - encourage PO intake  - Aspiration precautions     LENARD likely 2/2 urinary retention   - Baseline Cr around 0.92 but was 1.73 on admission  - creatinine improved to 1.4 today s/p jarrell  - continue flomax  - renal sono without hydro, med renal disease  - UA w/ +protein, trace ketones, trace leuks    - hold lasix today  - hold ACE-I  - Monitor renal function and I/o's  - Avoid nephrotoxic meds or renally dose medications as needed  - repeat BMP in AM    Mild transaminitis w/ cholestatic predominancy   - Suspect congestive hepatopathy given +JVD and hepatojugular reflex   - Benign abdominal exam   - AST/ALT and Tbili normal   - Trend LFTs     AF  - Currently rate controlled  - CHADSVASC = 4  - c/w BB and cardizem   - No longer on AC due to frequent falls   - Monitor on telemetry     HTN    - BP stable  - c/w diltiazem and metoprolol   - low sodium diet    HLD  -continue statin    Depression/Anxiety  - continune venlafaxine     VTE ppx: Heparin SC    Dispo: Remains acute; f/u blood cultures. IR consult to assist with left chest tube. PT evaluation.    Plan discussed with patient, daughter at bedside at length, RN

## 2024-09-15 NOTE — PROGRESS NOTE ADULT - SUBJECTIVE AND OBJECTIVE BOX
CHIEF COMPLAINT/INTERVAL HISTORY:    Patient is a 89y old  Male who presents with a chief complaint of AMS (15 Sep 2024 12:26)    SUBJECTIVE & OBJECTIVE: Pt seen and examined at bedside. No overnight events.     ROS: No chest pain, palpitations, SOB, light headedness, dizziness, headache, nausea/vomiting, fevers/chills, abdominal pain, dysuria.    ICU Vital Signs Last 24 Hrs  T(C): 36.4 (15 Sep 2024 12:08), Max: 36.4 (15 Sep 2024 04:00)  T(F): 97.5 (15 Sep 2024 12:08), Max: 97.6 (15 Sep 2024 04:00)  HR: 83 (15 Sep 2024 12:08) (67 - 99)  BP: 165/77 (15 Sep 2024 12:08) (125/71 - 165/77)  RR: 18 (15 Sep 2024 12:08) (18 - 18)  SpO2: 93% (15 Sep 2024 12:08) (93% - 99%)    O2 Parameters below as of 15 Sep 2024 12:08  Patient On (Oxygen Delivery Method): room air    MEDICATIONS  (STANDING):  atorvastatin 20 milliGRAM(s) Oral at bedtime  cefepime  Injectable. 2000 milliGRAM(s) IV Push every 12 hours  diltiazem    Tablet 30 milliGRAM(s) Oral every 6 hours  heparin   Injectable 5000 Unit(s) SubCutaneous every 8 hours  metoprolol tartrate 50 milliGRAM(s) Oral two times a day  senna 2 Tablet(s) Oral at bedtime  tamsulosin 0.4 milliGRAM(s) Oral at bedtime  venlafaxine 37.5 milliGRAM(s) Oral daily    MEDICATIONS  (PRN):  acetaminophen     Tablet .. 650 milliGRAM(s) Oral every 6 hours PRN Temp greater or equal to 38C (100.4F), Mild Pain (1 - 3)  aluminum hydroxide/magnesium hydroxide/simethicone Suspension 30 milliLiter(s) Oral every 4 hours PRN Dyspepsia  melatonin 3 milliGRAM(s) Oral at bedtime PRN Insomnia  ondansetron Injectable 4 milliGRAM(s) IV Push every 8 hours PRN Nausea and/or Vomiting  polyethylene glycol 3350 17 Gram(s) Oral two times a day PRN Constipation      LABS:                        13.7   11.04 )-----------( 187      ( 15 Sep 2024 06:12 )             43.9     09-15    142  |  108  |  43.8<H>  ----------------------------<  104<H>  4.6   |  22.0  |  1.47<H>    Ca    9.0      15 Sep 2024 06:12  Phos  3.4     09-15  Mg     2.0     09-15    TPro  6.1<L>  /  Alb  2.3<L>  /  TBili  1.3  /  DBili  x   /  AST  32  /  ALT  10  /  AlkPhos  105  09-15    PT/INR - ( 13 Sep 2024 23:00 )   PT: 14.0 sec;   INR: 1.27 ratio         PTT - ( 13 Sep 2024 23:00 )  PTT:33.5 sec  Urinalysis Basic - ( 15 Sep 2024 06:12 )    Color: x / Appearance: x / SG: x / pH: x  Gluc: 104 mg/dL / Ketone: x  / Bili: x / Urobili: x   Blood: x / Protein: x / Nitrite: x   Leuk Esterase: x / RBC: x / WBC x   Sq Epi: x / Non Sq Epi: x / Bacteria: x      PHYSICAL EXAM:    GENERAL: elderly male, alert and oriented today, laying in bed, NAD  HEAD:  Atraumatic, Normocephalic  EYES: EOMI, PERRLA, conjunctiva and sclera clear  ENMT: Moist mucous membranes  NECK: Supple   NERVOUS SYSTEM:  Alert & Oriented X3, generalized weakness  CHEST/LUNG: coarse breath sounds  HEART: Regular rate and rhythm; + S1/S2  ABDOMEN: Soft, Nontender, Nondistended   EXTREMITIES: no pedal edema       CHIEF COMPLAINT/INTERVAL HISTORY:    Patient is a 89y old  Male who presents with a chief complaint of AMS (15 Sep 2024 12:26)    SUBJECTIVE & OBJECTIVE: Pt seen and examined at bedside. No overnight events. Awake/alert and oriented today. Denies any acute complaints. Saturating well on RA. CT chest reviewed; IR consulted.    ROS: No chest pain, palpitations, light headedness, dizziness, headache, nausea/vomiting, fevers/chills, abdominal pain, dysuria.    ICU Vital Signs Last 24 Hrs  T(C): 36.4 (15 Sep 2024 12:08), Max: 36.4 (15 Sep 2024 04:00)  T(F): 97.5 (15 Sep 2024 12:08), Max: 97.6 (15 Sep 2024 04:00)  HR: 83 (15 Sep 2024 12:08) (67 - 99)  BP: 165/77 (15 Sep 2024 12:08) (125/71 - 165/77)  RR: 18 (15 Sep 2024 12:08) (18 - 18)  SpO2: 93% (15 Sep 2024 12:08) (93% - 99%)    O2 Parameters below as of 15 Sep 2024 12:08  Patient On (Oxygen Delivery Method): room air    MEDICATIONS  (STANDING):  atorvastatin 20 milliGRAM(s) Oral at bedtime  cefepime  Injectable. 2000 milliGRAM(s) IV Push every 12 hours  diltiazem    Tablet 30 milliGRAM(s) Oral every 6 hours  heparin   Injectable 5000 Unit(s) SubCutaneous every 8 hours  metoprolol tartrate 50 milliGRAM(s) Oral two times a day  senna 2 Tablet(s) Oral at bedtime  tamsulosin 0.4 milliGRAM(s) Oral at bedtime  venlafaxine 37.5 milliGRAM(s) Oral daily    MEDICATIONS  (PRN):  acetaminophen     Tablet .. 650 milliGRAM(s) Oral every 6 hours PRN Temp greater or equal to 38C (100.4F), Mild Pain (1 - 3)  aluminum hydroxide/magnesium hydroxide/simethicone Suspension 30 milliLiter(s) Oral every 4 hours PRN Dyspepsia  melatonin 3 milliGRAM(s) Oral at bedtime PRN Insomnia  ondansetron Injectable 4 milliGRAM(s) IV Push every 8 hours PRN Nausea and/or Vomiting  polyethylene glycol 3350 17 Gram(s) Oral two times a day PRN Constipation      LABS:                        13.7   11.04 )-----------( 187      ( 15 Sep 2024 06:12 )             43.9     09-15    142  |  108  |  43.8<H>  ----------------------------<  104<H>  4.6   |  22.0  |  1.47<H>    Ca    9.0      15 Sep 2024 06:12  Phos  3.4     09-15  Mg     2.0     09-15    TPro  6.1<L>  /  Alb  2.3<L>  /  TBili  1.3  /  DBili  x   /  AST  32  /  ALT  10  /  AlkPhos  105  09-15    PT/INR - ( 13 Sep 2024 23:00 )   PT: 14.0 sec;   INR: 1.27 ratio         PTT - ( 13 Sep 2024 23:00 )  PTT:33.5 sec  Urinalysis Basic - ( 15 Sep 2024 06:12 )    Color: x / Appearance: x / SG: x / pH: x  Gluc: 104 mg/dL / Ketone: x  / Bili: x / Urobili: x   Blood: x / Protein: x / Nitrite: x   Leuk Esterase: x / RBC: x / WBC x   Sq Epi: x / Non Sq Epi: x / Bacteria: x      PHYSICAL EXAM:    GENERAL: elderly male, alert and oriented today, laying in bed, NAD  HEAD:  Atraumatic, Normocephalic  EYES: EOMI, PERRLA, conjunctiva and sclera clear  ENMT: Moist mucous membranes  NECK: Supple   NERVOUS SYSTEM:  Alert & Oriented X3, generalized weakness  CHEST/LUNG: coarse breath sounds  HEART: Regular rate and rhythm; + S1/S2  ABDOMEN: Soft, Nontender, Nondistended   EXTREMITIES: no pedal edema

## 2024-09-15 NOTE — PROGRESS NOTE ADULT - SUBJECTIVE AND OBJECTIVE BOX
Patient seen and examined.  Denies CP, SOB, N/V. Denies any difficulty breathing     T(C): 36.4 (09-15-24 @ 12:08)  T(F): 97.5 (09-15-24 @ 12:08)  HR: 83 (09-15-24 @ 12:08)  BP: 165/77 (09-15-24 @ 12:08)    RR: 18 (09-15-24 @ 12:08)  SpO2: 93% (09-15-24 @ 12:08)    Physical Exam:  Gen: A&O confused at times   Pulm:  decreased breath sounds left side   CV:  S1S2,   Abd: +BS, soft, NT, ND  Ext:  +DP b/l, no c/c/e      I&O's Detail    14 Sep 2024 07:01  -  15 Sep 2024 07:00  --------------------------------------------------------  IN:  Total IN: 0 mL    OUT:    Indwelling Catheter - Urethral (mL): 1050 mL  Total OUT: 1050 mL    Total NET: -1050 mL                              13.7   11.04 )-----------( 187      ( 15 Sep 2024 06:12 )             43.9   09-15    142  |  108  |  43.8<H>  ----------------------------<  104<H>  4.6   |  22.0  |  1.47<H>    Ca    9.0      15 Sep 2024 06:12  Phos  3.4     09-15  Mg     2.0     09-15    TPro  6.1<L>  /  Alb  2.3<L>  /  TBili  1.3  /  DBili  x   /  AST  32  /  ALT  10  /  AlkPhos  105  09-15  aPTT: 33.5 sec; PT: 14.0 sec; INR: 1.27 ratio  09-13-24 @ 23:00         CAPILLARY BLOOD GLUCOSE            Medications:  acetaminophen     Tablet .. 650 milliGRAM(s) Oral every 6 hours PRN  aluminum hydroxide/magnesium hydroxide/simethicone Suspension 30 milliLiter(s) Oral every 4 hours PRN  atorvastatin 20 milliGRAM(s) Oral at bedtime  cefepime  Injectable. 2000 milliGRAM(s) IV Push every 12 hours  diltiazem    Tablet 30 milliGRAM(s) Oral every 6 hours  heparin   Injectable 5000 Unit(s) SubCutaneous every 8 hours  melatonin 3 milliGRAM(s) Oral at bedtime PRN  metoprolol tartrate 50 milliGRAM(s) Oral two times a day  ondansetron Injectable 4 milliGRAM(s) IV Push every 8 hours PRN  polyethylene glycol 3350 17 Gram(s) Oral two times a day PRN  senna 2 Tablet(s) Oral at bedtime  tamsulosin 0.4 milliGRAM(s) Oral at bedtime  venlafaxine 37.5 milliGRAM(s) Oral daily      < from: CT Chest No Cont (09.14.24 @ 11:28) >  IMPRESSION:    1.  The left pleural effusion has increased in size.  2.  There is an extrapleural/loculated elliptical shaped area of   increased attenuation (30 Hounsfield units) which could represent   products of hemorrhage or alternatively be proteinaceous in origin.  3.  The right pleural effusion has increased in size.  4.  Left eighth throughtenth ribs are fractured at the costovertebral   margin.  5.  CT chest with contrast is recommended.    < end of copied text >

## 2024-09-15 NOTE — PROGRESS NOTE ADULT - ASSESSMENT
89 year old male with a PMHx of AFib (no longer on Eliquis due to falls), HTN, BPH, mild cognitive impairment, recently admitted to [facility] 8/16-8/21/24 s/p mechanical fall with transverse process spinal fractures and Left extrapleural hematoma with concern for active extravasation, ultimately with no active extravasation seen by IR during angiogram, and no intervention required from thoracic surgery, with patient discharged to rehab. Patient now sent to [facility] ER last night 9/13/24 for increased lethargy at [facility].  Patient with a recent history of trouble swallowing, placed on aspiration diet with thickened liquids, and placed on Levaquin for aspiration PNA.  Despite treatment, patient noted with increased lethargy at [facility], so he was sent to the ER.      Outpatient CT chest 9/12/24 showed a Left pleural effusion with high density component consistent with a hemothorax with deviation of trachea to the right.  Thoracic surgery team called to follow up on imaging.

## 2024-09-15 NOTE — SWALLOW BEDSIDE ASSESSMENT ADULT - SLP GENERAL OBSERVATIONS
Pt received and seen awake and alert, seated upright in bed, daughter present at bedside, cooperative, 0/10 pain pre/post assessment.

## 2024-09-15 NOTE — SWALLOW BEDSIDE ASSESSMENT ADULT - COMMENTS
Per MD note, "89 y/o M w/ PMH AFib (no longer on Eliquis 2/2 falls), HTN, BPH, HFpEF (61% 08/16/24), mild cognitive impairment (AAO2-3 at baseline), recently admitted to [facility] trauma service (8/16/24 - 8/21/24) s/p mechanical fall found to have transverse process spinal fractures, multiple rib fractures and Left extrapleural hematoma w/ concern for active extravasation.   At that time pt was on elquis, it was reversed pt received 1u PRBC but ultimately pt had no active extravasation seen by IR during angiogram and no intervention required from thoracic surgery.  Hospital course was also complicated by LENARD due to urinary retention and AF RVR.   AC w/s d/c'd during that admission due to frequent falls and pt ultimately d/c'd to [facility] 08/21/24 presents back to [facility] ED from [facility] for lethargy.    While at [facility] pt was reported to dysphagia and diet was adjusted to thickened liquids and was started on levaquin (completed 09/10) for aspiration PNA.  Pt has become increasingly lethargic past few days and was attributed to worsening aspiration therefore was sent for outpt CT chest 9/12/24 to assess and was significant for Left pleural effusion with high density component consistent with a hemothorax with deviation of trachea to the right and was subsequently sent in to hospital.  At this time pt is a poor historian however does report sob but denies cp, palpitations, abd pain, N/V, HA and is unsure if he has been retaining urine or had dysuria."

## 2024-09-15 NOTE — SWALLOW BEDSIDE ASSESSMENT ADULT - SWALLOW EVAL: PATIENT/FAMILY GOALS STATEMENT
"I want him to drink normal liquids because it was just recently that his diet was changed at the nursing home." - Pt's daughter

## 2024-09-15 NOTE — SWALLOW BEDSIDE ASSESSMENT ADULT - SWALLOW EVAL: DIAGNOSIS
Suspected mod pharyngeal dysphagia, characterized by immediate and delayed coughing s/p swallows of thin liquids and mildly-thick liquids. Oral stage of swallow appeared relatively unremarkable, however, assessment was limited d/t pt only willing to accept puree textures and various liquid consistencies with max verbal encouragement. No advanced solids trials 2/2 pt report of poor appetite. Pharyngeal dysphagia suspected marked by suspected delayed initiation of pharyngeal swallow response times, mildly decreased hyolaryngeal elevation via palpation, and immediate coughing s/p swallows of thin liquids, and delayed coughing s/p swallows of mildly-thick liquids following consecutive sips.  Pt with no overt s/s of aspiration/penetration across all PO trials of moderately-thick liquids and puree textures. No additional PO trials assessed per pt request.

## 2024-09-16 LAB
ANION GAP SERPL CALC-SCNC: 11 MMOL/L — SIGNIFICANT CHANGE UP (ref 5–17)
BASOPHILS # BLD AUTO: 0.07 K/UL — SIGNIFICANT CHANGE UP (ref 0–0.2)
BASOPHILS NFR BLD AUTO: 0.6 % — SIGNIFICANT CHANGE UP (ref 0–2)
BUN SERPL-MCNC: 40.5 MG/DL — HIGH (ref 8–20)
CALCIUM SERPL-MCNC: 8.4 MG/DL — SIGNIFICANT CHANGE UP (ref 8.4–10.5)
CHLORIDE SERPL-SCNC: 107 MMOL/L — SIGNIFICANT CHANGE UP (ref 96–108)
CO2 SERPL-SCNC: 24 MMOL/L — SIGNIFICANT CHANGE UP (ref 22–29)
CREAT SERPL-MCNC: 1.11 MG/DL — SIGNIFICANT CHANGE UP (ref 0.5–1.3)
EGFR: 63 ML/MIN/1.73M2 — SIGNIFICANT CHANGE UP
EOSINOPHIL # BLD AUTO: 0.41 K/UL — SIGNIFICANT CHANGE UP (ref 0–0.5)
EOSINOPHIL NFR BLD AUTO: 3.6 % — SIGNIFICANT CHANGE UP (ref 0–6)
GLUCOSE SERPL-MCNC: 102 MG/DL — HIGH (ref 70–99)
HCT VFR BLD CALC: 42.9 % — SIGNIFICANT CHANGE UP (ref 39–50)
HGB BLD-MCNC: 14 G/DL — SIGNIFICANT CHANGE UP (ref 13–17)
IMM GRANULOCYTES NFR BLD AUTO: 1.9 % — HIGH (ref 0–0.9)
LYMPHOCYTES # BLD AUTO: 1.67 K/UL — SIGNIFICANT CHANGE UP (ref 1–3.3)
LYMPHOCYTES # BLD AUTO: 14.8 % — SIGNIFICANT CHANGE UP (ref 13–44)
MAGNESIUM SERPL-MCNC: 1.9 MG/DL — SIGNIFICANT CHANGE UP (ref 1.6–2.6)
MCHC RBC-ENTMCNC: 30.4 PG — SIGNIFICANT CHANGE UP (ref 27–34)
MCHC RBC-ENTMCNC: 32.6 GM/DL — SIGNIFICANT CHANGE UP (ref 32–36)
MCV RBC AUTO: 93.1 FL — SIGNIFICANT CHANGE UP (ref 80–100)
MONOCYTES # BLD AUTO: 1.1 K/UL — HIGH (ref 0–0.9)
MONOCYTES NFR BLD AUTO: 9.7 % — SIGNIFICANT CHANGE UP (ref 2–14)
NEUTROPHILS # BLD AUTO: 7.85 K/UL — HIGH (ref 1.8–7.4)
NEUTROPHILS NFR BLD AUTO: 69.4 % — SIGNIFICANT CHANGE UP (ref 43–77)
PHOSPHATE SERPL-MCNC: 2.6 MG/DL — SIGNIFICANT CHANGE UP (ref 2.4–4.7)
PLATELET # BLD AUTO: 210 K/UL — SIGNIFICANT CHANGE UP (ref 150–400)
POTASSIUM SERPL-MCNC: 4.2 MMOL/L — SIGNIFICANT CHANGE UP (ref 3.5–5.3)
POTASSIUM SERPL-SCNC: 4.2 MMOL/L — SIGNIFICANT CHANGE UP (ref 3.5–5.3)
RBC # BLD: 4.61 M/UL — SIGNIFICANT CHANGE UP (ref 4.2–5.8)
RBC # FLD: 16.5 % — HIGH (ref 10.3–14.5)
SODIUM SERPL-SCNC: 142 MMOL/L — SIGNIFICANT CHANGE UP (ref 135–145)
T PALLIDUM AB TITR SER: NEGATIVE — SIGNIFICANT CHANGE UP
WBC # BLD: 11.31 K/UL — HIGH (ref 3.8–10.5)
WBC # FLD AUTO: 11.31 K/UL — HIGH (ref 3.8–10.5)

## 2024-09-16 PROCEDURE — 99232 SBSQ HOSP IP/OBS MODERATE 35: CPT

## 2024-09-16 PROCEDURE — 71045 X-RAY EXAM CHEST 1 VIEW: CPT | Mod: 26

## 2024-09-16 PROCEDURE — 99231 SBSQ HOSP IP/OBS SF/LOW 25: CPT

## 2024-09-16 PROCEDURE — 99221 1ST HOSP IP/OBS SF/LOW 40: CPT | Mod: FS

## 2024-09-16 RX ADMIN — DILTIAZEM HYDROCHLORIDE 30 MILLIGRAM(S): 5 INJECTION INTRAVENOUS at 12:44

## 2024-09-16 RX ADMIN — Medication 1 TABLET(S): at 17:23

## 2024-09-16 RX ADMIN — Medication 20 MILLIGRAM(S): at 22:34

## 2024-09-16 RX ADMIN — TAMSULOSIN HYDROCHLORIDE 0.4 MILLIGRAM(S): 0.4 CAPSULE ORAL at 22:35

## 2024-09-16 RX ADMIN — METOPROLOL TARTRATE 50 MILLIGRAM(S): 100 TABLET ORAL at 05:17

## 2024-09-16 RX ADMIN — METOPROLOL TARTRATE 50 MILLIGRAM(S): 100 TABLET ORAL at 17:18

## 2024-09-16 RX ADMIN — VENLAFAXINE HYDROCHLORIDE 37.5 MILLIGRAM(S): 150 CAPSULE, EXTENDED RELEASE ORAL at 12:44

## 2024-09-16 RX ADMIN — DILTIAZEM HYDROCHLORIDE 30 MILLIGRAM(S): 5 INJECTION INTRAVENOUS at 05:17

## 2024-09-16 RX ADMIN — CEFEPIME 2000 MILLIGRAM(S): 2 INJECTION, POWDER, FOR SOLUTION INTRAVENOUS at 17:19

## 2024-09-16 RX ADMIN — Medication 5000 UNIT(S): at 05:17

## 2024-09-16 RX ADMIN — Medication 25 GRAM(S): at 17:19

## 2024-09-16 RX ADMIN — DILTIAZEM HYDROCHLORIDE 30 MILLIGRAM(S): 5 INJECTION INTRAVENOUS at 17:18

## 2024-09-16 RX ADMIN — DILTIAZEM HYDROCHLORIDE 30 MILLIGRAM(S): 5 INJECTION INTRAVENOUS at 00:23

## 2024-09-16 RX ADMIN — CEFEPIME 2000 MILLIGRAM(S): 2 INJECTION, POWDER, FOR SOLUTION INTRAVENOUS at 05:17

## 2024-09-16 RX ADMIN — Medication 2 TABLET(S): at 22:35

## 2024-09-16 NOTE — PROGRESS NOTE ADULT - ASSESSMENT
89 year old male with a PMHx of AFib (no longer on Eliquis due to falls), HTN, BPH, mild cognitive impairment, recently admitted to [facility] 8/16-8/21/24 s/p mechanical fall with transverse process spinal fractures and Left extrapleural hematoma with concern for active extravasation, ultimately with no active extravasation seen by IR during angiogram, and no intervention required from thoracic surgery, with patient discharged to rehab. Patient now sent to [facility] ER last night 9/13/24 for increased lethargy at [facility].  Patient with a recent history of trouble swallowing, placed on aspiration diet with thickened liquids, and placed on Levaquin for aspiration PNA.  Despite treatment, patient noted with increased lethargy and AMS at [facility], so he was sent to the ER.      Outpatient CT chest 9/12/24 showed a Left pleural effusion with high density component consistent with a hemothorax with deviation of trachea to the right.  Thoracic surgery team called to follow up on imaging.

## 2024-09-16 NOTE — DIETITIAN INITIAL EVALUATION ADULT - OTHER INFO
89 y/o M w/ PMH AFib (no longer on Eliquis 2/2 falls), HTN, BPH, HFpEF (61% 08/16/24), mild cognitive impairment (AAO2-3 at baseline), recently admitted to [facility] trauma service (8/16/24 - 8/21/24) s/p mechanical fall found to have transverse process spinal fractures, multiple rib fractures and Left extrapleural hematoma w/ concern for active extravasation but was r/o w/ IR during angiogram and eliquis was d/c'd. Hospital course was also complicated by LENARD due to urinary retention and AF RVR, medications were adjusted and pt was d/c'd to [facility] on 08/21/24 but presents back to [facility] ED for lethargy. Of note while at [facility] pt was reported to dysphagia, diet was adjusted to thickened liquids, was started on levaquin (completed 09/10) for aspiration PNA and sent for outpt CT chest 9/12/24 which was significant for large left pleural effusion with high density component consistent with a hemothorax with deviation of trachea to the right and was subsequently sent in to hospital.  VS relatively stable other than RR 20 and maintaining O2 sat>95% ORA.  Clinically toxic appearing w/ +JVD and hepatojugular reflex. Initial w/u significant for leukocytosis but has no L-shift, LENARD w/ Cr 1.73, Trop 71-->70.  EKG w/ no acute ischemic changes, CXR report pending but L-pleural effusion noted, possible small rt effusion and pulm vascular congestion appreciated.  CTSx consulted by ED but no plan for thoracentesis at this time.  Admit for acute metabolic encephalopathy, CHF exacerbation, SIRS, type II MI and LENARD.

## 2024-09-16 NOTE — PROGRESS NOTE ADULT - SUBJECTIVE AND OBJECTIVE BOX
Subjective: Patient remains stable on room air at rest. HCT stable at 43. WBC 11 on cefepime. Blood cultures remain NGTD at 48H. Daughter bedside.     Vital Signs:  Vital Signs Last 24 Hrs  T(C): 36.7 (09-16-24 @ 05:14), Max: 36.8 (09-15-24 @ 21:13)  T(F): 98.1 (09-16-24 @ 05:14), Max: 98.2 (09-15-24 @ 21:13)  HR: 90 (09-16-24 @ 05:14) (83 - 92)  BP: 135/77 (09-16-24 @ 05:14) (135/77 - 165/77)  RR: 18 (09-16-24 @ 05:14) (16 - 18)  SpO2: 100% (09-16-24 @ 05:14) (93% - 100%) on (O2)      General: NAD  Neurology: Awake  ENT: Gross hearing intact, grossly patent pharynx, no stridor  Neck: Neck supple, trachea midline  Respiratory: Diminished at L base  CV: S1S2, no murmurs, rubs or gallops  Extremities: No edema  Skin: No Rashes, Hematoma, Ecchymosis      Relevant labs, radiology and Medications reviewed                        14.0   11.31 )-----------( 210      ( 16 Sep 2024 05:41 )             42.9     09-16    142  |  107  |  40.5<H>  ----------------------------<  102<H>  4.2   |  24.0  |  1.11    Ca    8.4      16 Sep 2024 05:41  Phos  2.6     09-16  Mg     1.9     09-16    TPro  6.1<L>  /  Alb  2.3<L>  /  TBili  1.3  /  DBili  x   /  AST  32  /  ALT  10  /  AlkPhos  105  09-15      MEDICATIONS  (STANDING):  atorvastatin 20 milliGRAM(s) Oral at bedtime  cefepime  Injectable. 2000 milliGRAM(s) IV Push every 12 hours  diltiazem    Tablet 30 milliGRAM(s) Oral every 6 hours  metoprolol tartrate 50 milliGRAM(s) Oral two times a day  senna 2 Tablet(s) Oral at bedtime  tamsulosin 0.4 milliGRAM(s) Oral at bedtime  venlafaxine 37.5 milliGRAM(s) Oral daily    MEDICATIONS  (PRN):  acetaminophen     Tablet .. 650 milliGRAM(s) Oral every 6 hours PRN Temp greater or equal to 38C (100.4F), Mild Pain (1 - 3)  aluminum hydroxide/magnesium hydroxide/simethicone Suspension 30 milliLiter(s) Oral every 4 hours PRN Dyspepsia  melatonin 3 milliGRAM(s) Oral at bedtime PRN Insomnia  ondansetron Injectable 4 milliGRAM(s) IV Push every 8 hours PRN Nausea and/or Vomiting  polyethylene glycol 3350 17 Gram(s) Oral two times a day PRN Constipation        Assessment  89y Male  w/ PAST MEDICAL & SURGICAL HISTORY:  HTN (hypertension)      Afib      Chronic constipation      Insomnia      Hyperlipidemia      Anxiety and depression      Mild cognitive impairment      No significant past surgical history      admitted with complaints of Patient is a 89y old  Male who presents with a chief complaint of AMS (16 Sep 2024 08:51)

## 2024-09-16 NOTE — DIETITIAN NUTRITION RISK NOTIFICATION - TREATMENT: THE FOLLOWING DIET HAS BEEN RECOMMENDED
Diet, Soft and Bite Sized:   DASH/TLC {Sodium & Cholesterol Restricted} (DASH)  Moderately Thick Liquids (MODTHICKLIQS) (09-16-24 @ 10:50) [Active]

## 2024-09-16 NOTE — DIETITIAN INITIAL EVALUATION ADULT - NS FNS DIET ORDER
Diet, Soft and Bite Sized:   DASH/TLC {Sodium & Cholesterol Restricted} (DASH)  Moderately Thick Liquids (MODTHICKLIQS) (09-16-24 @ 10:50)

## 2024-09-16 NOTE — PHYSICAL THERAPY INITIAL EVALUATION ADULT - PERTINENT HX OF CURRENT PROBLEM, REHAB EVAL
Pt is an 89 y/o male who presented from [facility] with lethargy. Previously hospitalized after fall at [facility] resulting in rib and transverse process fx's. CT (+) for large left pleural effusion.

## 2024-09-16 NOTE — DIETITIAN INITIAL EVALUATION ADULT - PERTINENT LABORATORY DATA
09-16    142  |  107  |  40.5<H>  ----------------------------<  102<H>  4.2   |  24.0  |  1.11    Ca    8.4      16 Sep 2024 05:41  Phos  2.6     09-16  Mg     1.9     09-16    TPro  6.1<L>  /  Alb  2.3<L>  /  TBili  1.3  /  DBili  x   /  AST  32  /  ALT  10  /  AlkPhos  105  09-15  A1C with Estimated Average Glucose Result: 5.7 % (09-15-24 @ 06:12)

## 2024-09-16 NOTE — DIETITIAN NUTRITION RISK NOTIFICATION - ADDITIONAL COMMENTS/DIETITIAN RECOMMENDATIONS
1) Liberalize DASH/TLC restriction 2/2 altered consistency  2) Add Ensure BID, consistency per SLP   3) Rx MVI daily  4) RD to provide Magic Cup TID   5) Monitor weights daily for trend/accuracy

## 2024-09-16 NOTE — DIETITIAN INITIAL EVALUATION ADULT - ORAL INTAKE PTA/DIET HISTORY
Pt sleeping through most of interview, Pts daughter at bedside assists. Pt has had poor appetite/PO intake for prolonged period of time, completing <25% of meals in house, dislikes pureed/mod thick consistency. SLP camille completed today with recommendation to advance diet to Soft & Bite Sized/mod thick. Per previous nutrition assessment completed Aug 2024, Pt reported poor PO intake and weight loss unknown amount, weight documented at the time 187lbs. Current weight 158lbs noted, unclear accuracy of weights though daughter does admit to some weight loss. Pt agreeable to trial Magic Cup and Ensure, prefers chocolate.

## 2024-09-16 NOTE — PROGRESS NOTE ADULT - ASSESSMENT
88 y/o M w/ PMH AFib (no longer on Eliquis 2/2 falls), HTN, BPH, HFpEF (61% 08/16/24), mild cognitive impairment (AAO2-3 at baseline), recently admitted to [facility] trauma service (8/16/24 - 8/21/24) s/p mechanical fall found to have transverse process spinal fractures, multiple rib fractures and Left extrapleural hematoma w/ concern for active extravasation but was r/o w/ IR during angiogram and eliquis was d/c'd.  Hospital course was also complicated by LENARD due to urinary retention and AF RVR, medications were adjusted and pt was d/c'd to [facility] on 08/21/24 but presents back to [facility] ED for lethargy.   Of note while at [facility] pt was reported to dysphagia, diet was adjusted to thickened liquids, was started on levaquin (completed 09/10) for aspiration PNA and sent for outpt CT chest 9/12/24 which was significant for large left pleural effusion with high density component consistent with a hemothorax with deviation of trachea to the right and was subsequently sent in to hospital.  VS relatively stable other than RR 20 and maintaining O2 sat>95% ORA.  Clinically toxic appearing w/ +JVD and hepatojugular reflex.  Initial w/u significant for leukocytosis but has no L-shift, LENARD w/ Cr 1.73, Trop 71-->70.  EKG w/ no acute ischemic changes, CXR report pending but L-pleural effusion noted, possible small rt effusion and pulm vascular congestion appreciated.  CTSx consulted by ED but no plan for thoracentesis at this time.  Admit for acute metabolic encephalopathy, CHF exacerbation, SIRS, type II MI and LENARD.     Acute metabolic encephalopathy likely multifactorial 2/2 uremia, SIRS, aspiration, pleural effusion, urinary retention - improved  - at baseline patient with mild cognitive impairment who resides in the memory unit at his facility  - currently AAO x 2- 3 and following all commands but sundowns  - CTH negative for acute pathology   - CXR report pending but L-pleural effusion noted, possible small rt effusion and pulm vascular congestion noted  - Completed recent treatment for aspiration PNA on 09/10 w/ levaquin; f/u blood cultures and continue Cefepime for now  - continue dysphagia diet per SLO  - s/p jarrell placement in ED for retention   - TSH, B12, folate, and ammonia within normal range  - f/u RPR    - UA negative for UTI    Pleural Effusions  - patient with known left hemothorax   - currently remains stable on room air  - CT chest with worsening bilateral effusions  - CTSx consulted but no plan for emergent thoracentesis at this time and recommended IR eval due to left extra and intrapleural effusions  - IR consulted - recommended surgical evacuation  - Thoracic follow up appreciated; family has opted for conservative management. Hospice referral requested.  - Despite outpt CT report of hemothorax pts h/h stable and hemodynamically stable  - recently completed Levaquin for aspirin PNA  - blood cultures negative  - Aspiration and fall precautions   - thoracic recs appreciated    Acute on Chronic HFpEF  - appears euvolemic today s/p IV lasix  - hold PO lasix due to concerns for intravascular volume depletion  - CXR and CT chest reviewed but patient is stable on room air  - BNP 13K and trops 71-->70  - Had TTE on 08/16/24 w/ LVEF 61%; will hold off on repeat TTE at this time   - Monitor I/O's and fluid restrict   - Maintain K>4 and Mg>2  - Monitor on telemetry     SIRS w/ leukocytosis and tachypnea w/ suspicion for sepsis   - Completed full course of levaquin on 09/10 for aspiration pna  - Suspect ongoing aspiration however also has large L-effusion   - Leukocytosis improving and clinically non toxic appearsing  - UA negative for pyuria  - f/u blood cultures   - d/c cefepime if cultures are negative  - If thoracentesis done will send for culture and assess fluid analysis   - Monitor CBC and temperature       Type II MI, likely demand 2/2 above vs poor renal clearance  - Trop 71-->70  - EKG w/ no acute ischemic changes  - No chest pain reported   - BNP >13K suspect multifactorial from acute CHF, LENARD and possible sepsis   - Had TTE on 08/16/24 w/ LVEF 61%; will hold off on repeat TTE at this time   - Monitor on telemetry     AGMA likely 2/2 starvation ketosis and uremia - improved  - UA w/ +ketones but no evidence of UTI   - Uremia likely from LENARD in the setting of urinary retention   - lactate normalized  - encourage PO intake as discussed with RN  - Aspiration precautions     LENARD likely 2/2 urinary retention   - Baseline Cr around 0.92 but was 1.73 on admission  - creatinine improved to 1.1 today s/p jarrell  - continue flomax  - renal sono without hydro, med renal disease  - UA w/ +protein, trace ketones, trace leuks    - hold lasix and ACE-I  - Monitor renal function and I/o's  - Avoid nephrotoxic meds or renally dose medications as needed    Mild transaminitis w/ cholestatic predominancy   - Suspect congestive hepatopathy given +JVD and hepatojugular reflex   - Benign abdominal exam   - AST/ALT and Tbili normal   - Trend LFTs     AF  - Currently rate controlled  - CHADSVASC = 4  - c/w BB and cardizem   - No longer on AC due to frequent falls   - Monitor on telemetry     HTN    - BP stable  - c/w diltiazem and metoprolol   - low sodium diet    HLD  -continue statin    Depression/Anxiety  - continue venlafaxine     NSVT  -3 beats earlier this morning   -replace mag  -K > 4  -continue beta-blocker  -TTE without WMA    VTE ppx: Heparin SC    Dispo: Medically stable for discharge, but daughter interested to pursue hospice at [facility] vs returning to [facility]. Hospice referral requested.     Plan discussed with patient, daughter at bedside at length, RN, IR PA, Thoracic team, RN, CCM, Hospice SW

## 2024-09-16 NOTE — DIETITIAN INITIAL EVALUATION ADULT - ADD RECOMMEND
Liberalize DASH/TLC restriction 2/2 altered consistency; Add Ensure BID; Rx MVI daily; RD to provide Magic Cup TID

## 2024-09-16 NOTE — PHYSICAL THERAPY INITIAL EVALUATION ADULT - ADDITIONAL COMMENTS
Pt is from the [facility] in the memory unit. On this admission pt is from [facility] at [facility].

## 2024-09-16 NOTE — CONSULT NOTE ADULT - SUBJECTIVE AND OBJECTIVE BOX
HPI:  89 y/o M w/ PMH AFib (no longer on Eliquis 2/2 falls), HTN, BPH, HFpEF (61% 08/16/24), mild cognitive impairment (AAO2-3 at baseline), recently admitted to [facility] trauma service (8/16/24 - 8/21/24) s/p mechanical fall found to have transverse process spinal fractures, multiple rib fractures and Left extrapleural hematoma w/ concern for active extravasation.   At that time pt was on elquis, it was reversed pt received 1u PRBC but ultimately pt had no active extravasation seen by IR during angiogram and no intervention required from thoracic surgery.  Hospital course was also complicated by LENARD due to urinary retention and AF RVR.   AC w/s d/c'd during that admission due to frequent falls and pt ultimately d/c'd to [facility] 08/21/24 presents back to [facility] ED from [facility] for lethargy.    While at [facility] pt was reported to dysphagia and diet was adjusted to thickened liquids and was started on levaquin (completed 09/10) for aspiration PNA.  Pt has become increasingly lethargic past few days and was attributed to worsening aspiration therefore was sent for outpt CT chest 9/12/24 to assess and was significant for Left pleural effusion with high density component consistent with a hemothorax with deviation of trachea to the right and was subsequently sent in to hospital.  At this time pt is a poor historian however does report sob but denies cp, palpitations, abd pain, N/V, HA and is unsure if he has been retaining urine or had dysuria.  (14 Sep 2024 07:39)    CT noted for extrapleural hematoma and intrapleural fluid collection. CTS following and recommending IR drainage of intrapleural fluid.         ============================================================================  Medications:  Home Medications:  Colace 100 mg oral capsule: 2 cap(s) orally once a day (at bedtime) (14 Sep 2024 09:21)  dilTIAZem 30 mg oral tablet: 1 tab(s) orally every 6 hours (14 Sep 2024 09:21)  furosemide 20 mg oral tablet: 1 tab(s) orally once a day (14 Sep 2024 09:21)  Melatonin 3 mg oral tablet: 1 tab(s) orally once a day (at bedtime) (14 Sep 2024 09:21)  metoprolol tartrate 50 mg oral tablet: 1 tab(s) orally 2 times a day (14 Sep 2024 09:21)  Polyethylene Glycol 3350: 17 gram(s) orally once a day (14 Sep 2024 09:21)  pravastatin 80 mg oral tablet: 1 tab(s) orally once a day (14 Sep 2024 09:21)  ramipril: 10 milligram(s) orally once a day (14 Sep 2024 09:21)  senna (sennosides) 8.6 mg oral tablet: 1 tab(s) orally once a day (14 Sep 2024 09:21)  venlafaxine 37.5 mg oral tablet: 1 tab(s) orally once a day (14 Sep 2024 09:21)    MEDICATIONS  (STANDING):  atorvastatin 20 milliGRAM(s) Oral at bedtime  cefepime  Injectable. 2000 milliGRAM(s) IV Push every 12 hours  diltiazem    Tablet 30 milliGRAM(s) Oral every 6 hours  metoprolol tartrate 50 milliGRAM(s) Oral two times a day  senna 2 Tablet(s) Oral at bedtime  tamsulosin 0.4 milliGRAM(s) Oral at bedtime  venlafaxine 37.5 milliGRAM(s) Oral daily    MEDICATIONS  (PRN):  acetaminophen     Tablet .. 650 milliGRAM(s) Oral every 6 hours PRN Temp greater or equal to 38C (100.4F), Mild Pain (1 - 3)  aluminum hydroxide/magnesium hydroxide/simethicone Suspension 30 milliLiter(s) Oral every 4 hours PRN Dyspepsia  melatonin 3 milliGRAM(s) Oral at bedtime PRN Insomnia  ondansetron Injectable 4 milliGRAM(s) IV Push every 8 hours PRN Nausea and/or Vomiting  polyethylene glycol 3350 17 Gram(s) Oral two times a day PRN Constipation      Allergies:   No Known Allergies    ============================================================================  PAST MEDICAL & SURGICAL HISTORY:  HTN (hypertension)      Afib      Chronic constipation      Insomnia      Hyperlipidemia      Anxiety and depression      Mild cognitive impairment      No significant past surgical history          FAMILY HISTORY:  No pertinent family history in first degree relatives        Social History:      ============================================================================  Vitals:  Vital Signs Last 24 Hrs  T(C): 36.7 (16 Sep 2024 05:14), Max: 36.8 (15 Sep 2024 21:13)  T(F): 98.1 (16 Sep 2024 05:14), Max: 98.2 (15 Sep 2024 21:13)  HR: 90 (16 Sep 2024 05:14) (83 - 92)  BP: 135/77 (16 Sep 2024 05:14) (135/77 - 165/77)  BP(mean): --  RR: 18 (16 Sep 2024 05:14) (16 - 18)  SpO2: 100% (16 Sep 2024 05:14) (93% - 100%)    Parameters below as of 16 Sep 2024 05:14  Patient On (Oxygen Delivery Method): room air    Labs:                        14.0   11.31 )-----------( 210      ( 16 Sep 2024 05:41 )             42.9     09-16    142  |  107  |  40.5<H>  ----------------------------<  102<H>  4.2   |  24.0  |  1.11    Ca    8.4      16 Sep 2024 05:41  Phos  2.6     09-16  Mg     1.9     09-16    TPro  6.1<L>  /  Alb  2.3<L>  /  TBili  1.3  /  DBili  x   /  AST  32  /  ALT  10  /  AlkPhos  105  09-15        Imaging:   Pertinent Imaging Reviewed.    ============================================================================

## 2024-09-16 NOTE — DIETITIAN INITIAL EVALUATION ADULT - ETIOLOGY
related to inability to meet sufficient protein-energy needs in setting of metabolic enceph, multiple hosp admissions/rehab,dysphagia, advanced age, pleural effusion

## 2024-09-16 NOTE — PROGRESS NOTE ADULT - PROBLEM SELECTOR PLAN 1
Patient brought to ER with lethargy, AMS.   Was being treated for aspiration PNA at [facility] with PO Levaquin and placed on an aspiration diet with thickened liquids.   Thoracic sx consulted for retained Left hemothorax on outpatient CT imaging.  SpO2 stable on room air.  H/H stable since discharge 1 month ago.   CT reviewed by Dr. Mcnamara - appears to  have intrapleural and extrapleural components.  Recommend IR for potential chest tube placement and drainage of intrapleural fluid collection.      Plan discussed / reviewed with Thoracic Surgery attending Dr. Mcnamara.
SpO2 stable on room air.  H/H stable since discharge 1 month ago.   CT reviewed by Dr. Mcnamara - appears to  have intrapleural and extrapleural components.    CTS and IR with no window for drainage of intrapleural pleural effusion secondary to extrapleural hematoma  Recommending conservative management for now to avoid potential infection with hematoma disruption  Daughter in agreement with no major interventions  B/l component to pleural effusion (small Right) however patient unable to tolerate diuresis at this time secondary to LENARD and hypovolemia  IS/OOB/PT  Chest PT TID  Nebs and or Mucomyst PRN  C/W antibiotics for PNA (cefepime)  Aspiration precautions and bedside swallow eval currently in progress  Plan discussed / reviewed with Thoracic Surgery attending Dr. Mcnamara.
Patient brought to ER with lethargy, AMS.   Was being treated for aspiration PNA at [facility] with PO Levaquin and placed on an aspiration diet with thickened liquids.   Thoracic sx consulted for retained Left hemothorax on outpatient CT imaging.  SpO2 stable on room air.  H/H stable since discharge 1 month ago.   CXR with improved Left hemothorax.  No emergent thoracic surgery intervention required at this time.   Plan discussed / reviewed with Thoracic Surgery attending Dr. Mcnamara.

## 2024-09-16 NOTE — PROGRESS NOTE ADULT - SUBJECTIVE AND OBJECTIVE BOX
CHIEF COMPLAINT/INTERVAL HISTORY:    Patient is a 89y old  Male who presents with a chief complaint of Metabolic encephalopathy     (16 Sep 2024 14:03)    SUBJECTIVE & OBJECTIVE: Pt seen and examined at bedside. No overnight events. Denies any acute complaints; AAO x 2-3.     ROS: No chest pain, palpitations, SOB, light headedness, dizziness, headache, nausea/vomiting, fevers/chills, abdominal pain, dysuria or increased urinary frequency.    ICU Vital Signs Last 24 Hrs  T(C): 36.4 (16 Sep 2024 16:33), Max: 36.8 (15 Sep 2024 21:13)  T(F): 97.6 (16 Sep 2024 16:33), Max: 98.2 (15 Sep 2024 21:13)  HR: 89 (16 Sep 2024 16:33) (84 - 92)  BP: 149/73 (16 Sep 2024 16:33) (135/77 - 155/60)  RR: 18 (16 Sep 2024 16:33) (16 - 18)  SpO2: 99% (16 Sep 2024 16:33) (98% - 100%)    O2 Parameters below as of 16 Sep 2024 16:33  Patient On (Oxygen Delivery Method): room air      MEDICATIONS  (STANDING):  atorvastatin 20 milliGRAM(s) Oral at bedtime  cefepime  Injectable. 2000 milliGRAM(s) IV Push every 12 hours  diltiazem    Tablet 30 milliGRAM(s) Oral every 6 hours  magnesium sulfate  IVPB 1 Gram(s) IV Intermittent once  metoprolol tartrate 50 milliGRAM(s) Oral two times a day  multivitamin 1 Tablet(s) Oral daily  senna 2 Tablet(s) Oral at bedtime  tamsulosin 0.4 milliGRAM(s) Oral at bedtime  venlafaxine 37.5 milliGRAM(s) Oral daily    MEDICATIONS  (PRN):  acetaminophen     Tablet .. 650 milliGRAM(s) Oral every 6 hours PRN Temp greater or equal to 38C (100.4F), Mild Pain (1 - 3)  aluminum hydroxide/magnesium hydroxide/simethicone Suspension 30 milliLiter(s) Oral every 4 hours PRN Dyspepsia  melatonin 3 milliGRAM(s) Oral at bedtime PRN Insomnia  ondansetron Injectable 4 milliGRAM(s) IV Push every 8 hours PRN Nausea and/or Vomiting  polyethylene glycol 3350 17 Gram(s) Oral two times a day PRN Constipation      LABS:                        14.0   11.31 )-----------( 210      ( 16 Sep 2024 05:41 )             42.9     09-16    142  |  107  |  40.5<H>  ----------------------------<  102<H>  4.2   |  24.0  |  1.11    Ca    8.4      16 Sep 2024 05:41  Phos  2.6     09-16  Mg     1.9     09-16    TPro  6.1<L>  /  Alb  2.3<L>  /  TBili  1.3  /  DBili  x   /  AST  32  /  ALT  10  /  AlkPhos  105  09-15      Urinalysis Basic - ( 16 Sep 2024 05:41 )    Color: x / Appearance: x / SG: x / pH: x  Gluc: 102 mg/dL / Ketone: x  / Bili: x / Urobili: x   Blood: x / Protein: x / Nitrite: x   Leuk Esterase: x / RBC: x / WBC x   Sq Epi: x / Non Sq Epi: x / Bacteria: x    PHYSICAL EXAM:    GENERAL: elderly male, alert and oriented today, laying in bed, NAD  HEAD:  Atraumatic, Normocephalic  EYES: EOMI, PERRLA, conjunctiva and sclera clear  ENMT: Moist mucous membranes  NECK: Supple   NERVOUS SYSTEM:  Alert & Oriented X3, generalized weakness  CHEST/LUNG: coarse breath sounds  HEART: Regular rate and rhythm; + S1/S2  ABDOMEN: Soft, Nontender, Nondistended   EXTREMITIES: no pedal edema

## 2024-09-16 NOTE — DIETITIAN INITIAL EVALUATION ADULT - PERTINENT MEDS FT
MEDICATIONS  (STANDING):  atorvastatin 20 milliGRAM(s) Oral at bedtime  cefepime  Injectable. 2000 milliGRAM(s) IV Push every 12 hours  diltiazem    Tablet 30 milliGRAM(s) Oral every 6 hours  metoprolol tartrate 50 milliGRAM(s) Oral two times a day  senna 2 Tablet(s) Oral at bedtime  tamsulosin 0.4 milliGRAM(s) Oral at bedtime  venlafaxine 37.5 milliGRAM(s) Oral daily

## 2024-09-16 NOTE — CONSULT NOTE ADULT - ASSESSMENT
Patient is a 89 year-old admitted for AMS. seen in consultation for L chest tube placement. Patient known to IR service and underwent IR angio on 8/17 due to L chest hematoma, no active extravasation was seen at the time.  Imaging reviewed by Dr. Escobar  Left extrapleural hematoma now larger with mass effect on intrapleural fluid. Recommend CTS evaluation of surgical management of hematoma.       Please call extension [phone] with any questions, concerns or issues regarding above.

## 2024-09-17 PROCEDURE — 99232 SBSQ HOSP IP/OBS MODERATE 35: CPT

## 2024-09-17 RX ORDER — DILTIAZEM HYDROCHLORIDE 5 MG/ML
1 INJECTION INTRAVENOUS
Qty: 0 | Refills: 0 | DISCHARGE
Start: 2024-09-17

## 2024-09-17 RX ORDER — METOPROLOL TARTRATE 100 MG/1
1 TABLET ORAL
Qty: 0 | Refills: 0 | DISCHARGE
Start: 2024-09-17

## 2024-09-17 RX ORDER — VENLAFAXINE HYDROCHLORIDE 150 MG/1
1 CAPSULE, EXTENDED RELEASE ORAL
Qty: 0 | Refills: 0 | DISCHARGE
Start: 2024-09-17

## 2024-09-17 RX ORDER — TAMSULOSIN HYDROCHLORIDE 0.4 MG/1
1 CAPSULE ORAL
Qty: 0 | Refills: 0 | DISCHARGE
Start: 2024-09-17

## 2024-09-17 RX ADMIN — Medication 1 TABLET(S): at 11:17

## 2024-09-17 RX ADMIN — DILTIAZEM HYDROCHLORIDE 30 MILLIGRAM(S): 5 INJECTION INTRAVENOUS at 00:27

## 2024-09-17 RX ADMIN — Medication 20 MILLIGRAM(S): at 22:04

## 2024-09-17 RX ADMIN — METOPROLOL TARTRATE 50 MILLIGRAM(S): 100 TABLET ORAL at 17:21

## 2024-09-17 RX ADMIN — DILTIAZEM HYDROCHLORIDE 30 MILLIGRAM(S): 5 INJECTION INTRAVENOUS at 05:12

## 2024-09-17 RX ADMIN — DILTIAZEM HYDROCHLORIDE 30 MILLIGRAM(S): 5 INJECTION INTRAVENOUS at 23:22

## 2024-09-17 RX ADMIN — DILTIAZEM HYDROCHLORIDE 30 MILLIGRAM(S): 5 INJECTION INTRAVENOUS at 17:21

## 2024-09-17 RX ADMIN — TAMSULOSIN HYDROCHLORIDE 0.4 MILLIGRAM(S): 0.4 CAPSULE ORAL at 22:04

## 2024-09-17 RX ADMIN — DILTIAZEM HYDROCHLORIDE 30 MILLIGRAM(S): 5 INJECTION INTRAVENOUS at 11:17

## 2024-09-17 RX ADMIN — METOPROLOL TARTRATE 50 MILLIGRAM(S): 100 TABLET ORAL at 05:12

## 2024-09-17 RX ADMIN — VENLAFAXINE HYDROCHLORIDE 37.5 MILLIGRAM(S): 150 CAPSULE, EXTENDED RELEASE ORAL at 11:18

## 2024-09-17 RX ADMIN — Medication 2 TABLET(S): at 22:04

## 2024-09-17 RX ADMIN — CEFEPIME 2000 MILLIGRAM(S): 2 INJECTION, POWDER, FOR SOLUTION INTRAVENOUS at 05:11

## 2024-09-17 NOTE — CHART NOTE - NSCHARTNOTEFT_GEN_A_CORE
89yo M with PMH of dementia presents with known hematoma from mechanical fall in August and new small adjacent pleural effusion with atelectasis. HCT stable. Imaging reviewed and effusion not amendable to drainage without disruption of hematoma and increased risk of infection. D/W Family no major surgical interventions. Continue with conservative management and re-consult thoracic surgery as needed. D/W Dr. Heebrt. 88yo M with PMH of dementia presents with known hematoma from mechanical fall in August and new small adjacent pleural effusion with atelectasis. HCT stable. Patient HD stable on room air. Imaging reviewed and effusion not amendable to drainage without disruption of hematoma and increased risk of infection. D/W Family no major surgical interventions. Continue with conservative management and re-consult thoracic surgery as needed. D/W Dr. Hebert.

## 2024-09-17 NOTE — PROGRESS NOTE ADULT - SUBJECTIVE AND OBJECTIVE BOX
CHIEF COMPLAINT/INTERVAL HISTORY:    Patient is a 89y old  Male who presents with a chief complaint of AMS (16 Sep 2024 16:42)    SUBJECTIVE & OBJECTIVE: Pt seen and examined at bedside. Pulled out IV last night; likely due to sundowning. No acute complaints; calm/cooperative today.    ROS: No chest pain, palpitations, SOB, light headedness, dizziness, headache, nausea/vomiting, fevers/chills, abdominal pain.    ICU Vital Signs Last 24 Hrs  T(C): 36.4 (17 Sep 2024 12:11), Max: 36.4 (17 Sep 2024 04:51)  T(F): 97.6 (17 Sep 2024 12:11), Max: 97.6 (17 Sep 2024 12:11)  HR: 86 (17 Sep 2024 12:11) (78 - 90)  BP: 154/78 (17 Sep 2024 12:11) (123/62 - 154/78)  BP(mean): 3 (17 Sep 2024 04:51) (3 - 3)  RR: 18 (17 Sep 2024 12:11) (16 - 18)  SpO2: 95% (17 Sep 2024 12:11) (95% - 98%)    O2 Parameters below as of 17 Sep 2024 04:51  Patient On (Oxygen Delivery Method): room air    MEDICATIONS  (STANDING):  atorvastatin 20 milliGRAM(s) Oral at bedtime  diltiazem    Tablet 30 milliGRAM(s) Oral every 6 hours  metoprolol tartrate 50 milliGRAM(s) Oral two times a day  multivitamin 1 Tablet(s) Oral daily  senna 2 Tablet(s) Oral at bedtime  tamsulosin 0.4 milliGRAM(s) Oral at bedtime  venlafaxine 37.5 milliGRAM(s) Oral daily    MEDICATIONS  (PRN):  acetaminophen     Tablet .. 650 milliGRAM(s) Oral every 6 hours PRN Temp greater or equal to 38C (100.4F), Mild Pain (1 - 3)  aluminum hydroxide/magnesium hydroxide/simethicone Suspension 30 milliLiter(s) Oral every 4 hours PRN Dyspepsia  melatonin 3 milliGRAM(s) Oral at bedtime PRN Insomnia  ondansetron Injectable 4 milliGRAM(s) IV Push every 8 hours PRN Nausea and/or Vomiting  polyethylene glycol 3350 17 Gram(s) Oral two times a day PRN Constipation      LABS:                        14.0   11.31 )-----------( 210      ( 16 Sep 2024 05:41 )             42.9     09-16    142  |  107  |  40.5[H]  ----------------------------<  102[H]  4.2   |  24.0  |  1.11    Ca    8.4      16 Sep 2024 05:41  Phos  2.6     09-16  Mg     1.9     09-16    Urinalysis Basic - ( 16 Sep 2024 05:41 )    Color: x / Appearance: x / SG: x / pH: x  Gluc: 102 mg/dL / Ketone: x  / Bili: x / Urobili: x   Blood: x / Protein: x / Nitrite: x   Leuk Esterase: x / RBC: x / WBC x   Sq Epi: x / Non Sq Epi: x / Bacteria: x    PHYSICAL EXAM:    GENERAL: elderly male, alert and oriented today, laying in bed, NAD  HEAD:  Atraumatic, Normocephalic  EYES: EOMI, PERRLA, conjunctiva and sclera clear  ENMT: Moist mucous membranes  NECK: Supple   NERVOUS SYSTEM:  Alert & Oriented X3, generalized weakness  CHEST/LUNG: coarse breath sounds  HEART: Regular rate and rhythm; + S1/S2  ABDOMEN: Soft, Nontender, Nondistended   EXTREMITIES: no pedal edema

## 2024-09-17 NOTE — PROGRESS NOTE ADULT - ASSESSMENT
88 y/o M w/ PMH AFib (no longer on Eliquis 2/2 falls), HTN, BPH, HFpEF (61% 08/16/24), mild cognitive impairment (AAO2-3 at baseline), recently admitted to [facility] trauma service (8/16/24 - 8/21/24) s/p mechanical fall found to have transverse process spinal fractures, multiple rib fractures and Left extrapleural hematoma w/ concern for active extravasation but was r/o w/ IR during angiogram and eliquis was d/c'd.  Hospital course was also complicated by LENARD due to urinary retention and AF RVR, medications were adjusted and pt was d/c'd to [facility] on 08/21/24 but presents back to [facility] ED for lethargy.   Of note [facility] was reported to dysphagia, diet was adjusted to thickened liquids, was started on levaquin (completed 09/10) for aspiration PNA and sent for outpt CT chest 9/12/24 which was significant for large left pleural effusion with high density component consistent with a hemothorax with deviation of trachea to the right and was subsequently sent in to hospital.  VS relatively stable other than RR 20 and maintaining O2 sat>95% ORA.  Clinically toxic appearing w/ +JVD and hepatojugular reflex.  Initial w/u significant for leukocytosis but has no L-shift, LENARD w/ Cr 1.73, Trop 71-->70.  EKG w/ no acute ischemic changes, CXR report pending but L-pleural effusion noted, possible small rt effusion and pulm vascular congestion appreciated.  CTSx consulted by ED but no plan for thoracentesis at this time.  Admit for acute metabolic encephalopathy, CHF exacerbation, SIRS, type II MI and LENARD.     Acute metabolic encephalopathy likely multifactorial 2/2 uremia, SIRS, aspiration, pleural effusion, urinary retention - improved  - at baseline patient with mild cognitive impairment who resides in the memory unit at his facility  - currently AAO x 2- 3 and following all commands but sundowns  - CTH negative for acute pathology   - CXR report pending but L-pleural effusion noted, possible small rt effusion and pulm vascular congestion noted  - Completed recent treatment for aspiration PNA on 09/10 w/ levaquin; f/u blood cultures and continue Cefepime for now  - continue dysphagia diet per SLO  - s/p jarrell placement in ED for retention   - TSH, B12, folate, and ammonia within normal range  - RPR negative  - UA negative for UTI    Pleural Effusions  - patient with known left hemothorax   - currently remains stable on room air  - CT chest with worsening bilateral effusions  - CTSx consulted but no plan for emergent thoracentesis at this time and recommended IR eval due to left extra and intrapleural effusions  - IR consulted - recommended surgical evacuation  - Thoracic follow up appreciated; family has opted for conservative management.    - Despite outpt CT report of hemothorax pts h/h stable and hemodynamically stable  - recently completed Levaquin for aspirin PNA  - blood cultures negative  - Aspiration and fall precautions   - thoracic recs appreciated  - Hospice referral sent; awaiting approval    Acute on Chronic HFpEF  - appears euvolemic today s/p IV lasix  - hold PO lasix due to concerns for intravascular volume depletion  - CXR and CT chest reviewed but patient is stable on room air  - BNP 13K and trops 71-->70  - Had TTE on 08/16/24 w/ LVEF 61%; will hold off on repeat TTE at this time   - Monitor I/O's and fluid restrict   - Maintain K>4 and Mg>2  - resume PO lasix on discharge    SIRS w/ leukocytosis and tachypnea w/ suspicion for sepsis   - Completed full course of levaquin on 09/10 for aspiration pna  - Leukocytosis improved and clinically non toxic appearing  - UA negative for pyuria  - blood cultures negative  - d/c cefepime       Type II MI, likely demand 2/2 above vs poor renal clearance  - Trop 71-->70  - EKG w/ no acute ischemic changes  - No chest pain reported   - BNP >13K suspect multifactorial from acute CHF, LENARD and possible sepsis   - Had TTE on 08/16/24 w/ LVEF 61%; will hold off on repeat TTE at this time     AGMA likely 2/2 starvation ketosis and uremia - resolved  - UA w/ +ketones but no evidence of UTI   - Uremia likely from LENARD in the setting of urinary retention   - lactate normalized  - encourage PO intake as discussed with RN  - Aspiration precautions     LENARD likely 2/2 urinary retention   - Baseline Cr around 0.92 but was 1.73 on admission  - creatinine improved to 1.1   - continue flomax and maintain jarrell  - renal sono without hydro, med renal disease  - UA w/ +protein, trace ketones, trace leuks    - hold lasix and ACE-I  - Monitor renal function and I/o's  - Avoid nephrotoxic meds or renally dose medications as needed    Mild transaminitis w/ cholestatic predominancy   - Suspect congestive hepatopathy given +JVD and hepatojugular reflex   - Benign abdominal exam   - AST/ALT and Tbili normal     AF  - Currently rate controlled  - CHADSVASC = 4  - c/w BB and cardizem   - No longer on AC due to frequent falls   - Monitor on telemetry     HTN    - BP stable  - c/w diltiazem and metoprolol   - low sodium diet    HLD  -continue statin    Depression/Anxiety  - continue venlafaxine     VTE ppx: Heparin SC    Dispo: Medically stable for discharge to hospice at [facility] pending acceptance.    Plan discussed with patient, daughter, hospice TIAN, RN

## 2024-09-18 ENCOUNTER — TRANSCRIPTION ENCOUNTER (OUTPATIENT)
Age: 89
End: 2024-09-18

## 2024-09-18 PROCEDURE — 99232 SBSQ HOSP IP/OBS MODERATE 35: CPT

## 2024-09-18 RX ADMIN — DILTIAZEM HYDROCHLORIDE 30 MILLIGRAM(S): 5 INJECTION INTRAVENOUS at 12:15

## 2024-09-18 RX ADMIN — DILTIAZEM HYDROCHLORIDE 30 MILLIGRAM(S): 5 INJECTION INTRAVENOUS at 06:22

## 2024-09-18 RX ADMIN — METOPROLOL TARTRATE 50 MILLIGRAM(S): 100 TABLET ORAL at 18:00

## 2024-09-18 RX ADMIN — METOPROLOL TARTRATE 50 MILLIGRAM(S): 100 TABLET ORAL at 06:22

## 2024-09-18 RX ADMIN — TAMSULOSIN HYDROCHLORIDE 0.4 MILLIGRAM(S): 0.4 CAPSULE ORAL at 23:01

## 2024-09-18 RX ADMIN — DILTIAZEM HYDROCHLORIDE 30 MILLIGRAM(S): 5 INJECTION INTRAVENOUS at 18:00

## 2024-09-18 RX ADMIN — Medication 1 TABLET(S): at 12:15

## 2024-09-18 RX ADMIN — VENLAFAXINE HYDROCHLORIDE 37.5 MILLIGRAM(S): 150 CAPSULE, EXTENDED RELEASE ORAL at 12:15

## 2024-09-18 RX ADMIN — Medication 2 TABLET(S): at 23:00

## 2024-09-18 RX ADMIN — DILTIAZEM HYDROCHLORIDE 30 MILLIGRAM(S): 5 INJECTION INTRAVENOUS at 23:01

## 2024-09-18 RX ADMIN — Medication 20 MILLIGRAM(S): at 23:01

## 2024-09-18 NOTE — DISCHARGE NOTE PROVIDER - CARE PROVIDER_API CALL
Jovan Escobar  Vascular/Intervent Radiology  [address] [place] [id number]  Phone: [phone]  Fax: [phone]  Follow Up Time: 2 weeks    Moo Mcnamara  Thoracic Surgery  [address] [place] [id number]  Phone: [phone]  Fax: [phone]  Follow Up Time: 2 weeks

## 2024-09-18 NOTE — DISCHARGE NOTE PROVIDER - CARE PROVIDERS DIRECT ADDRESSES
[email or web address]

## 2024-09-18 NOTE — PROGRESS NOTE ADULT - SUBJECTIVE AND OBJECTIVE BOX
CHIEF COMPLAINT/INTERVAL HISTORY:    Patient is a 89y old  Male who presents with a chief complaint of AMS (18 Sep 2024 11:15)    SUBJECTIVE & OBJECTIVE: Pt seen and examined at bedside. NO overnight events. Delirious this morning; oriented to self but reports he was planting watermelon seeds. Awaiting discharge to [facility].    ROS: Unobtainable due to confusion    ICU Vital Signs Last 24 Hrs  T(C): 36.6 (18 Sep 2024 12:09), Max: 36.6 (17 Sep 2024 20:00)  T(F): 97.8 (18 Sep 2024 12:09), Max: 97.9 (17 Sep 2024 20:00)  HR: 63 (18 Sep 2024 12:09) (63 - 83)  BP: 140/82 (18 Sep 2024 12:09) (132/71 - 164/74)  RR: 18 (18 Sep 2024 12:09) (18 - 18)  SpO2: 96% (18 Sep 2024 12:09) (96% - 98%)    O2 Parameters below as of 18 Sep 2024 12:09  Patient On (Oxygen Delivery Method): room air    MEDICATIONS  (STANDING):  atorvastatin 20 milliGRAM(s) Oral at bedtime  diltiazem    Tablet 30 milliGRAM(s) Oral every 6 hours  metoprolol tartrate 50 milliGRAM(s) Oral two times a day  multivitamin 1 Tablet(s) Oral daily  senna 2 Tablet(s) Oral at bedtime  tamsulosin 0.4 milliGRAM(s) Oral at bedtime  venlafaxine 37.5 milliGRAM(s) Oral daily    MEDICATIONS  (PRN):  acetaminophen     Tablet .. 650 milliGRAM(s) Oral every 6 hours PRN Temp greater or equal to 38C (100.4F), Mild Pain (1 - 3)  aluminum hydroxide/magnesium hydroxide/simethicone Suspension 30 milliLiter(s) Oral every 4 hours PRN Dyspepsia  melatonin 3 milliGRAM(s) Oral at bedtime PRN Insomnia  ondansetron Injectable 4 milliGRAM(s) IV Push every 8 hours PRN Nausea and/or Vomiting  polyethylene glycol 3350 17 Gram(s) Oral two times a day PRN Constipation

## 2024-09-18 NOTE — PROGRESS NOTE ADULT - NUTRITIONAL ASSESSMENT
This patient has been assessed with a concern for Malnutrition and has been determined to have a diagnosis/diagnoses of Severe protein-calorie malnutrition.    This patient is being managed with:   Diet Soft and Bite Sized-  Moderately Thick Liquids (MODTHICKLIQS)  Supplement Feeding Modality:  Oral  Ensure Enlive Cans or Servings Per Day:  1       Frequency:  Three Times a day  Entered: Sep 16 2024  4:41PM  

## 2024-09-18 NOTE — DISCHARGE NOTE PROVIDER - NSDCMRMEDTOKEN_GEN_ALL_CORE_FT
dilTIAZem 30 mg oral tablet: 1 tab(s) orally every 6 hours  furosemide 20 mg oral tablet: 1 tab(s) orally once a day  Melatonin 3 mg oral tablet: 1 tab(s) orally once a day (at bedtime)  metoprolol tartrate 50 mg oral tablet: 1 tab(s) orally 2 times a day  Multiple Vitamins oral tablet: 1 tab(s) orally once a day  Polyethylene Glycol 3350: 17 gram(s) orally once a day  pravastatin 80 mg oral tablet: 1 tab(s) orally once a day  senna (sennosides) 8.6 mg oral tablet: 1 tab(s) orally once a day  tamsulosin 0.4 mg oral capsule: 1 cap(s) orally once a day (at bedtime)  venlafaxine 37.5 mg oral tablet: 1 tab(s) orally once a day

## 2024-09-18 NOTE — DISCHARGE NOTE PROVIDER - NSDCFUSCHEDAPPT_GEN_ALL_CORE_FT
[facility] Physician [facility]  TRAUMASURG [address]  Scheduled Appointment: 09/26/2024    Geovany Alejandre  [facility]  NEUROLOGY [address]  Scheduled Appointment: 10/31/2024

## 2024-09-18 NOTE — DISCHARGE NOTE PROVIDER - HOSPITAL COURSE
89 y/o male w/ a PMHx AFib (no longer on Eliquis 2/2 falls), HTN, BPH, HFpEF (61% 08/16/24), and mild cognitive impairment (AAO2-3 at baseline), recently admitted to [facility] trauma service (8/16/24 - 8/21/24) s/p mechanical fall found to have transverse process spinal fractures, multiple rib fractures and Left extrapleural hematoma w/ concern for active extravasation but was r/o w/ IR during angiogram and eliquis was d/c'd.  Hospital course was complicated by LENARD due to urinary retention and AF RVR. Medications were adjusted and pt was d/c'd to [facility] on 08/21/24 but now presents back to [facility] ED for lethargy. Of note ,while at [facility] pt was reported to dysphagia, diet was adjusted to thickened liquids, was started on levaquin (completed 09/10) for aspiration PNA and sent for outpt CT chest 9/12/24 which was significant for large left pleural effusion with high density component consistent with a hemothorax with deviation of trachea to the right and was subsequently sent in to hospital.  VS relatively stable other than RR 20 and maintaining O2 sat>95% ORA.  Clinically toxic appearing w/ +JVD and hepatojugular reflex.  Initial w/u significant for leukocytosis but has no L-shift, LENARD w/ Cr 1.73, Trop 71-->70.  EKG w/ no acute ischemic changes, CXR report pending but L-pleural effusion noted, possible small rt effusion and pulm vascular congestion appreciated.  CTSx and IR consulted by ED but no plan for surgical intervention at this time. Pt admitted for acute metabolic encephalopathy, CHF exacerbation, SIRS, type II MI and LENARD. CTH negative for acute pathology. Completed recent treatment for aspiration PNA on 09/10 w/ antibiotics; f/u blood cultures negative. Volume status now improved s/p IV Lasix. Renal function and leukocytosis w/ improvement and back to baseline. Cognition now back to baseline w/ mild cognitive impairment. Hospice referral sent but denied. Patient is medically stable for d/c to [facility].

## 2024-09-18 NOTE — DISCHARGE NOTE PROVIDER - PROVIDER TOKENS
PROVIDER:[TOKEN:[[id number]],FOLLOWUP:[2 weeks]],PROVIDER:[TOKEN:[[id number]],FOLLOWUP:[2 weeks]]

## 2024-09-18 NOTE — PROGRESS NOTE ADULT - ASSESSMENT
PHYSICAL EXAM:    GENERAL: elderly male, alert and oriented today, laying in bed, NAD  HEAD:  Atraumatic, Normocephalic  EYES: EOMI, PERRLA, conjunctiva and sclera clear  ENMT: Moist mucous membranes  NECK: Supple   NERVOUS SYSTEM:  Alert & Oriented X3, generalized weakness  CHEST/LUNG: coarse breath sounds  HEART: Regular rate and rhythm; + S1/S2  ABDOMEN: Soft, Nontender, Nondistended   EXTREMITIES: no pedal edema        Assessment and Plan:   · Assessment	  88 y/o M w/ PMH AFib (no longer on Eliquis 2/2 falls), HTN, BPH, HFpEF (61% 08/16/24), mild cognitive impairment (AAO2-3 at baseline), recently admitted to [facility] trauma service (8/16/24 - 8/21/24) s/p mechanical fall found to have transverse process spinal fractures, multiple rib fractures and Left extrapleural hematoma w/ concern for active extravasation but was r/o w/ IR during angiogram and eliquis was d/c'd.  Hospital course was also complicated by LENARD due to urinary retention and AF RVR, medications were adjusted and pt was d/c'd to [facility] on 08/21/24 but presents back to [facility] ED for lethargy.   Of [facility] was reported to dysphagia, diet was adjusted to thickened liquids, was started on levaquin (completed 09/10) for aspiration PNA and sent for outpt CT chest 9/12/24 which was significant for large left pleural effusion with high density component consistent with a hemothorax with deviation of trachea to the right and was subsequently sent in to hospital.  VS relatively stable other than RR 20 and maintaining O2 sat>95% ORA.  Clinically toxic appearing w/ +JVD and hepatojugular reflex.  Initial w/u significant for leukocytosis but has no L-shift, LENARD w/ Cr 1.73, Trop 71-->70.  EKG w/ no acute ischemic changes, CXR report pending but L-pleural effusion noted, possible small rt effusion and pulm vascular congestion appreciated.  CTSx consulted by ED but no plan for thoracentesis at this time.  Admit for acute metabolic encephalopathy, CHF exacerbation, SIRS, type II MI and LENARD.     Acute metabolic encephalopathy likely multifactorial 2/2 uremia, SIRS, aspiration, pleural effusion, urinary retention - improved  - at baseline patient with mild cognitive impairment who resides in the memory unit at his facility  - currently AAO x 2 and following all commands but sundowns and now has hospital acquired delirium  - CTH negative for acute pathology   - CXR report pending but L-pleural effusion noted, possible small rt effusion and pulm vascular congestion noted  - Completed recent treatment for aspiration PNA on 09/10 w/ levaquin; f/u blood cultures and continue Cefepime for now  - continue dysphagia diet per SLO  - s/p jarrell placement in ED for retention   - TSH, B12, folate, and ammonia within normal range  - RPR negative  - UA negative for UTI    Pleural Effusions  - patient with known left hemothorax   - currently remains stable on room air  - CT chest with worsening bilateral effusions  - CTSx consulted but no plan for emergent thoracentesis at this time and recommended IR eval due to left extra and intrapleural effusions  - IR consulted - recommended surgical evacuation  - Thoracic follow up appreciated; family has opted for conservative management.    - Despite outpt CT report of hemothorax pts h/h stable and hemodynamically stable  - recently completed Levaquin for aspirin PNA  - blood cultures negative  - Aspiration and fall precautions   - thoracic recs appreciated  - Hospice referral sent; awaiting approval    Acute on Chronic HFpEF  - remains euvolemic today s/p IV lasix  - hold off on PO lasix  - CXR and CT chest reviewed but patient is stable on room air  - BNP 13K and trops 71-->70  - Had TTE on 08/16/24 w/ LVEF 61%; will hold off on repeat TTE at this time   - Monitor I/O's and daily weights    SIRS w/ leukocytosis and tachypnea w/ suspicion for sepsis   - Completed full course of levaquin on 09/10 for aspiration pna  - Leukocytosis improved and clinically non toxic appearing  - UA negative for pyuria  - blood cultures negative  - cefepime was discontinued    Type II MI, likely demand 2/2 above vs poor renal clearance  - Trop 71-->70  - EKG w/ no acute ischemic changes  - No chest pain reported   - BNP >13K suspect multifactorial from acute CHF, LENARD and possible sepsis   - Had TTE on 08/16/24 w/ LVEF 61%; will hold off on repeat TTE at this time     AGMA likely 2/2 starvation ketosis and uremia - resolved  - UA w/ +ketones but no evidence of UTI   - Uremia likely from LENARD in the setting of urinary retention   - lactate normalized  - encourage PO intake as discussed with RN  - Aspiration precautions     LENARD likely 2/2 urinary retention   - Baseline Cr around 0.92 but was 1.73 on admission  - creatinine improved to 1.1   - continue flomax and maintain jarrell  - renal sono without hydro, med renal disease  - UA w/ +protein, trace ketones, trace leuks    - hold lasix and ACE-I  - Monitor renal function and I/o's  - Avoid nephrotoxic meds or renally dose medications as needed    Mild transaminitis w/ cholestatic predominancy   - Suspect congestive hepatopathy given +JVD and hepatojugular reflex   - Benign abdominal exam   - AST/ALT and Tbili normal     AF  - Currently rate controlled  - CHADSVASC = 4  - c/w BB and cardizem   - No longer on AC due to frequent falls   - Monitor on telemetry     HTN    - BP stable  - c/w diltiazem and metoprolol   - low sodium diet    HLD  -continue statin    Depression/Anxiety  - continue venlafaxine     VTE ppx: Heparin SC    Dispo: Medically stable for discharge but not accepted to [facility] with hospice; SW on board to arrange for discharge back to [facility].    Plan discussed with patient, RN, SW

## 2024-09-18 NOTE — DISCHARGE NOTE PROVIDER - NSDCCPCAREPLAN_GEN_ALL_CORE_FT
PRINCIPAL DISCHARGE DIAGNOSIS  Diagnosis: Metabolic encephalopathy  Assessment and Plan of Treatment: Improved. Likely multifactorial due to uremia, infection, aspiration, pleural effusion, and urinary retention. Now at baseline with mild cognitive impairment. Patient resides in the memory unit at his facility.      SECONDARY DISCHARGE DIAGNOSES  Diagnosis: Pleural effusion  Assessment and Plan of Treatment: CT chest with worsening bilateral effusions and known history of left hemothorax. Remains stable on room air. CT chest with worsening bilateral effusions. CT surgery and Interventional Radiology consulted but no plan for emergent interventions at this time. Recommend conservative management.    Diagnosis: Acute on chronic systolic congestive heart failure  Assessment and Plan of Treatment: Volume status improved. Continue with Lasix.    Diagnosis: Systemic inflammatory response syndrome (SIRS)  Assessment and Plan of Treatment: Completed full course of Levaquin on 09/10. Urinalaysis and blood cultures negative. Cefepime discontinued.    Diagnosis: Elevated troponin  Assessment and Plan of Treatment: Demand ischemia. Suspect multifactorial from acute CHF, LENARD and possible sepsis. Patient had TTE on 08/16/24 w/ LVEF 61%. No ECG changes appreciated.    Diagnosis: Uremia  Assessment and Plan of Treatment: Improved. Uremia likely from LENARD in the setting of urinary retention.    Diagnosis: LENARD (acute kidney injury)  Assessment and Plan of Treatment: Due to urinary retention. Continue with Flomax and maintain jarrell. Renal sonogram without any acute findings.    Diagnosis: Transaminitis  Assessment and Plan of Treatment: Benign abdominal exam. Labs improved and within normal limits.    Diagnosis: Atrial flutter  Assessment and Plan of Treatment: Continue with Diltiazem and Metoprolol.    Diagnosis: HTN (hypertension)  Assessment and Plan of Treatment: Continue with Diltiazem and Metoprolol.    Diagnosis: HLD (hyperlipidemia)  Assessment and Plan of Treatment: Continue with Statin.    Diagnosis: Anxiety and depression  Assessment and Plan of Treatment: Continue with Venlafaxine.

## 2024-09-19 ENCOUNTER — TRANSCRIPTION ENCOUNTER (OUTPATIENT)
Age: 89
End: 2024-09-19

## 2024-09-19 VITALS
RESPIRATION RATE: 18 BRPM | HEART RATE: 83 BPM | DIASTOLIC BLOOD PRESSURE: 78 MMHG | SYSTOLIC BLOOD PRESSURE: 161 MMHG | TEMPERATURE: 97 F | OXYGEN SATURATION: 98 %

## 2024-09-19 LAB
CULTURE RESULTS: SIGNIFICANT CHANGE UP
CULTURE RESULTS: SIGNIFICANT CHANGE UP
SPECIMEN SOURCE: SIGNIFICANT CHANGE UP
SPECIMEN SOURCE: SIGNIFICANT CHANGE UP

## 2024-09-19 PROCEDURE — 99239 HOSP IP/OBS DSCHRG MGMT >30: CPT

## 2024-09-19 RX ADMIN — DILTIAZEM HYDROCHLORIDE 30 MILLIGRAM(S): 5 INJECTION INTRAVENOUS at 05:28

## 2024-09-19 RX ADMIN — METOPROLOL TARTRATE 50 MILLIGRAM(S): 100 TABLET ORAL at 05:28

## 2024-09-19 RX ADMIN — VENLAFAXINE HYDROCHLORIDE 37.5 MILLIGRAM(S): 150 CAPSULE, EXTENDED RELEASE ORAL at 12:44

## 2024-09-19 RX ADMIN — Medication 1 TABLET(S): at 12:44

## 2024-09-19 RX ADMIN — DILTIAZEM HYDROCHLORIDE 30 MILLIGRAM(S): 5 INJECTION INTRAVENOUS at 12:44

## 2024-09-19 NOTE — DISCHARGE NOTE NURSING/CASE MANAGEMENT/SOCIAL WORK - NSDCCRNAME_GEN_ALL_CORE_FT
[facility] at [facility]  [facility] [place] [address]

## 2024-09-19 NOTE — DISCHARGE NOTE NURSING/CASE MANAGEMENT/SOCIAL WORK - PATIENT PORTAL LINK FT
You can access the FollowMyHealth Patient Portal offered by [facility] by registering at the following website: [email or web address]. By joining [employer]’s FollowMyHealth portal, you will also be able to view your health information using other applications (apps) compatible with our system.

## 2024-09-26 ENCOUNTER — APPOINTMENT (OUTPATIENT)
Dept: TRAUMA SURGERY | Facility: CLINIC | Age: 89
End: 2024-09-26

## 2024-09-29 PROCEDURE — 82435 ASSAY OF BLOOD CHLORIDE: CPT

## 2024-09-29 PROCEDURE — 72125 CT NECK SPINE W/O DYE: CPT | Mod: MC

## 2024-09-29 PROCEDURE — 85730 THROMBOPLASTIN TIME PARTIAL: CPT

## 2024-09-29 PROCEDURE — 86850 RBC ANTIBODY SCREEN: CPT

## 2024-09-29 PROCEDURE — 84484 ASSAY OF TROPONIN QUANT: CPT

## 2024-09-29 PROCEDURE — 80061 LIPID PANEL: CPT

## 2024-09-29 PROCEDURE — 87640 STAPH A DNA AMP PROBE: CPT

## 2024-09-29 PROCEDURE — 36415 COLL VENOUS BLD VENIPUNCTURE: CPT

## 2024-09-29 PROCEDURE — 86900 BLOOD TYPING SEROLOGIC ABO: CPT

## 2024-09-29 PROCEDURE — 80048 BASIC METABOLIC PNL TOTAL CA: CPT

## 2024-09-29 PROCEDURE — 82140 ASSAY OF AMMONIA: CPT

## 2024-09-29 PROCEDURE — 86901 BLOOD TYPING SEROLOGIC RH(D): CPT

## 2024-09-29 PROCEDURE — 82009 KETONE BODYS QUAL: CPT

## 2024-09-29 PROCEDURE — 73600 X-RAY EXAM OF ANKLE: CPT

## 2024-09-29 PROCEDURE — 84100 ASSAY OF PHOSPHORUS: CPT

## 2024-09-29 PROCEDURE — 71250 CT THORAX DX C-: CPT | Mod: MC

## 2024-09-29 PROCEDURE — 82550 ASSAY OF CK (CPK): CPT

## 2024-09-29 PROCEDURE — 83735 ASSAY OF MAGNESIUM: CPT

## 2024-09-29 PROCEDURE — 84443 ASSAY THYROID STIM HORMONE: CPT

## 2024-09-29 PROCEDURE — 93005 ELECTROCARDIOGRAM TRACING: CPT

## 2024-09-29 PROCEDURE — 76942 ECHO GUIDE FOR BIOPSY: CPT

## 2024-09-29 PROCEDURE — 93306 TTE W/DOPPLER COMPLETE: CPT

## 2024-09-29 PROCEDURE — 83605 ASSAY OF LACTIC ACID: CPT

## 2024-09-29 PROCEDURE — 71045 X-RAY EXAM CHEST 1 VIEW: CPT

## 2024-09-29 PROCEDURE — 96374 THER/PROPH/DIAG INJ IV PUSH: CPT

## 2024-09-29 PROCEDURE — 86780 TREPONEMA PALLIDUM: CPT

## 2024-09-29 PROCEDURE — 80053 COMPREHEN METABOLIC PANEL: CPT

## 2024-09-29 PROCEDURE — 36430 TRANSFUSION BLD/BLD COMPNT: CPT

## 2024-09-29 PROCEDURE — 87641 MR-STAPH DNA AMP PROBE: CPT

## 2024-09-29 PROCEDURE — 74177 CT ABD & PELVIS W/CONTRAST: CPT | Mod: MC

## 2024-09-29 PROCEDURE — 86923 COMPATIBILITY TEST ELECTRIC: CPT

## 2024-09-29 PROCEDURE — 70450 CT HEAD/BRAIN W/O DYE: CPT | Mod: MC

## 2024-09-29 PROCEDURE — 99285 EMERGENCY DEPT VISIT HI MDM: CPT

## 2024-09-29 PROCEDURE — 85018 HEMOGLOBIN: CPT

## 2024-09-29 PROCEDURE — 85014 HEMATOCRIT: CPT

## 2024-09-29 PROCEDURE — 82746 ASSAY OF FOLIC ACID SERUM: CPT

## 2024-09-29 PROCEDURE — 96375 TX/PRO/DX INJ NEW DRUG ADDON: CPT

## 2024-09-29 PROCEDURE — 85610 PROTHROMBIN TIME: CPT

## 2024-09-29 PROCEDURE — 85025 COMPLETE CBC W/AUTO DIFF WBC: CPT

## 2024-09-29 PROCEDURE — P9016: CPT

## 2024-09-29 PROCEDURE — 81001 URINALYSIS AUTO W/SCOPE: CPT

## 2024-09-29 PROCEDURE — 82803 BLOOD GASES ANY COMBINATION: CPT

## 2024-09-29 PROCEDURE — 83880 ASSAY OF NATRIURETIC PEPTIDE: CPT

## 2024-09-29 PROCEDURE — 82947 ASSAY GLUCOSE BLOOD QUANT: CPT

## 2024-09-29 PROCEDURE — 82607 VITAMIN B-12: CPT

## 2024-09-29 PROCEDURE — 84295 ASSAY OF SERUM SODIUM: CPT

## 2024-09-29 PROCEDURE — 71260 CT THORAX DX C+: CPT | Mod: MC

## 2024-09-29 PROCEDURE — 76775 US EXAM ABDO BACK WALL LIM: CPT

## 2024-09-29 PROCEDURE — 82330 ASSAY OF CALCIUM: CPT

## 2024-09-29 PROCEDURE — 87040 BLOOD CULTURE FOR BACTERIA: CPT

## 2024-09-29 PROCEDURE — 87086 URINE CULTURE/COLONY COUNT: CPT

## 2024-09-29 PROCEDURE — 72170 X-RAY EXAM OF PELVIS: CPT

## 2024-09-29 PROCEDURE — 75894 X-RAYS TRANSCATH THERAPY: CPT

## 2024-09-29 PROCEDURE — 85027 COMPLETE CBC AUTOMATED: CPT

## 2024-09-29 PROCEDURE — 83036 HEMOGLOBIN GLYCOSYLATED A1C: CPT

## 2024-09-29 PROCEDURE — 84132 ASSAY OF SERUM POTASSIUM: CPT

## 2024-09-29 PROCEDURE — 99291 CRITICAL CARE FIRST HOUR: CPT | Mod: 25

## 2024-10-31 ENCOUNTER — APPOINTMENT (OUTPATIENT)
Dept: NEUROLOGY | Facility: CLINIC | Age: 89
End: 2024-10-31

## 2024-12-03 NOTE — PHYSICAL THERAPY INITIAL EVALUATION ADULT - ACTIVE RANGE OF MOTION EXAMINATION, REHAB EVAL
Addended by: RENETTA LUNDBERG on: 12/2/2024 06:55 PM     Modules accepted: Orders    
b/l UE/LE to mid-range
